# Patient Record
Sex: MALE | Race: ASIAN | NOT HISPANIC OR LATINO | Employment: OTHER | URBAN - METROPOLITAN AREA
[De-identification: names, ages, dates, MRNs, and addresses within clinical notes are randomized per-mention and may not be internally consistent; named-entity substitution may affect disease eponyms.]

---

## 2017-01-13 RX ORDER — CETIRIZINE HYDROCHLORIDE 10 MG/1
10 TABLET ORAL DAILY
Qty: 90 TABLET | Refills: 2 | Status: SHIPPED | OUTPATIENT
Start: 2017-01-13 | End: 2017-01-16 | Stop reason: SDUPTHER

## 2017-01-13 NOTE — TELEPHONE ENCOUNTER
----- Message from Melvi Lopez sent at 1/13/2017 12:43 PM CST -----  Contact: pt  Pt states needs a prescription called in today for him,pt is out,Cetirizine 10 mg...[phone] (home)           ..  Cait on [facility] and [facility] nelly

## 2017-01-16 RX ORDER — CETIRIZINE HYDROCHLORIDE 10 MG/1
10 TABLET ORAL DAILY
Qty: 90 TABLET | Refills: 2 | Status: SHIPPED | OUTPATIENT
Start: 2017-01-16 | End: 2017-06-26 | Stop reason: SDUPTHER

## 2017-01-16 NOTE — TELEPHONE ENCOUNTER
----- Message from Melvi Lopez sent at 1/16/2017 10:34 AM CST -----  Contact: pt  Pt states a prescription was supposed to be called in to the pharmacy on Friday but was not for  cetirizine 10 mg..[phone] (please notify)         .  Cait on government(please)

## 2017-02-01 ENCOUNTER — OFFICE VISIT (OUTPATIENT)
Dept: FAMILY MEDICINE | Facility: CLINIC | Age: 67
End: 2017-02-01
Payer: MEDICARE

## 2017-02-01 VITALS
DIASTOLIC BLOOD PRESSURE: 62 MMHG | HEART RATE: 70 BPM | OXYGEN SATURATION: 97 % | RESPIRATION RATE: 18 BRPM | WEIGHT: 197.56 LBS | HEIGHT: 72 IN | BODY MASS INDEX: 26.76 KG/M2 | SYSTOLIC BLOOD PRESSURE: 118 MMHG | TEMPERATURE: 97 F

## 2017-02-01 DIAGNOSIS — J30.9 ALLERGIC RHINITIS, UNSPECIFIED ALLERGIC RHINITIS TRIGGER, UNSPECIFIED RHINITIS SEASONALITY: Primary | ICD-10-CM

## 2017-02-01 PROCEDURE — 99999 PR PBB SHADOW E&M-EST. PATIENT-LVL III: CPT | Mod: PBBFAC,,, | Performed by: INTERNAL MEDICINE

## 2017-02-01 PROCEDURE — 99213 OFFICE O/P EST LOW 20 MIN: CPT | Mod: S$PBB,,, | Performed by: INTERNAL MEDICINE

## 2017-02-01 PROCEDURE — 99213 OFFICE O/P EST LOW 20 MIN: CPT | Mod: PBBFAC,PO | Performed by: INTERNAL MEDICINE

## 2017-02-01 RX ORDER — MONTELUKAST SODIUM 10 MG/1
10 TABLET ORAL NIGHTLY
Qty: 30 TABLET | Refills: 0 | Status: SHIPPED | OUTPATIENT
Start: 2017-02-01 | End: 2017-03-03

## 2017-02-01 RX ORDER — GLUCOSAMINE/CHONDRO SU A 500-400 MG
1 TABLET ORAL 2 TIMES DAILY
COMMUNITY
End: 2021-09-01

## 2017-02-01 RX ORDER — FLUTICASONE PROPIONATE 50 MCG
2 SPRAY, SUSPENSION (ML) NASAL DAILY
Qty: 16 BOTTLE | Refills: 1 | Status: SHIPPED | OUTPATIENT
Start: 2017-02-01 | End: 2018-02-06

## 2017-02-01 NOTE — MR AVS SNAPSHOT
[facility]  [address] [address]  Phone: [phone]                  Darnell Ruelas Jr.   2017 3:30 PM   Office Visit    Description:  Male : 1950   Provider:  Beto Godfrey MD   Department:  [facility]           Reason for Visit     Sinus Problem           Diagnoses this Visit        Comments    Allergic rhinitis, unspecified allergic rhinitis trigger, unspecified rhinitis seasonality    -  Primary            To Do List           Future Appointments        Provider Department Dept Phone    3/1/2017 1:00 PM LABORATORY, [facility] [phone]    3/1/2017 1:40 PM Hermes Mortensen MD [facility] Hemotology Oncology [phone]    2017 11:00 AM Bea Miles PA-C [facility] Rheumatology [phone]      Goals (5 Years of Data)     None      Follow-Up and Disposition     Return if symptoms worsen or fail to improve.       These Medications        Disp Refills Start End    montelukast (SINGULAIR) 10 mg tablet 30 tablet 0 2017 3/3/2017    Take 1 tablet (10 mg total) by mouth every evening. - Oral    Pharmacy: [facility] [address] [place] [address] & [facility] Ph #: [phone]       fluticasone (FLONASE) 50 mcg/actuation nasal spray 16 Bottle 1 2017     2 sprays by Each Nare route once daily. - Each Nare    Pharmacy: [facility] [address] & [facility] Ph #: [phone]         [facility] On Call     [facility] On Call Nurse Care Line -  Assistance  Registered nurses in the [facility] provide clinical advisement, health education, appointment booking, and other advisory services.  Call for this free service at [phone].             Medications           Message regarding Medications     Verify the changes and/or additions to your medication regime listed below are the same as  discussed with your clinician today.  If any of these changes or additions are incorrect, please notify your healthcare provider.        START taking these NEW medications        Refills    montelukast (SINGULAIR) 10 mg tablet 0    Sig: Take 1 tablet (10 mg total) by mouth every evening.    Class: Normal    Route: Oral    fluticasone (FLONASE) 50 mcg/actuation nasal spray 1    Si sprays by Each Nare route once daily.    Class: Normal    Route: Each Nare           Verify that the below list of medications is an accurate representation of the medications you are currently taking.  If none reported, the list may be blank. If incorrect, please contact your healthcare provider. Carry this list with you in case of emergency.           Current Medications     cetirizine (ZYRTEC) 10 MG tablet Take 1 tablet (10 mg total) by mouth once daily.    diclofenac sodium 20 mg/gram /actuation(2 %) sopm Apply 40 mg topically 2 (two) times daily.    ERGOCALCIFEROL, VITAMIN D2, (VITAMIN D ORAL) Take 1 tablet by mouth once daily.     fish oil-omega-3 fatty acids 300-1,000 mg capsule Take 2 g by mouth once daily.    glucosamine-chondroitin 500-400 mg tablet Take 1 tablet by mouth 2 (two) times daily.    multivitamin capsule Take 1 capsule by mouth once daily.    nystatin (MYCOSTATIN) powder Apply topically 2 (two) times daily.    simvastatin (ZOCOR) 20 MG tablet TAKE 1 TABLET EVERY EVENING    tramadol (ULTRAM) 50 mg tablet Take 1 tablet (50 mg total) by mouth every 6 (six) hours as needed for Pain.    vardenafil (LEVITRA) 20 MG tablet Take 20 mg by mouth.    fluticasone (FLONASE) 50 mcg/actuation nasal spray 2 sprays by Each Nare route once daily.    montelukast (SINGULAIR) 10 mg tablet Take 1 tablet (10 mg total) by mouth every evening.           Clinical Reference Information           Vital Signs - Last Recorded  Most recent update: 2017  3:49 PM by Armando Murdock LPN    BP Pulse Temp Resp Ht Wt    118/62 (BP Location: Right  arm, Patient Position: Sitting, BP Method: Manual) 70 97 °F (36.1 °C) (Tympanic) 18 6' (1.829 m) 89.6 kg (197 lb 8.5 oz)    SpO2 BMI             97% 26.79 kg/m2         Blood Pressure          Most Recent Value    BP  118/62      Allergies as of 2/1/2017     No Known Allergies      Immunizations Administered on Date of Encounter - 2/1/2017     None

## 2017-02-01 NOTE — PROGRESS NOTES
Subjective:       Patient ID: Darnell Ruelas Jr. is a 67 y.o. male.    Chief Complaint: Sinus Problem  -allergy symptoms-------  HPI  Review of Systems   Constitutional: Negative for chills and fever.   HENT: Positive for congestion, postnasal drip, rhinorrhea and sneezing. Negative for sore throat.    Eyes: Positive for itching.   Respiratory: Negative for apnea, cough, choking, chest tightness, shortness of breath, wheezing and stridor.    Cardiovascular: Negative for chest pain, palpitations and leg swelling.   Gastrointestinal: Negative for abdominal pain, nausea and vomiting.   Genitourinary: Negative.    Neurological: Negative.        Objective:      Physical Exam   Constitutional: He is oriented to person, place, and time. He appears well-developed and well-nourished. No distress.   HENT:   Head: Normocephalic and atraumatic.   Mouth/Throat: No oropharyngeal exudate.   Eyes: Pupils are equal, round, and reactive to light.   Neck: Normal range of motion. Neck supple. Carotid bruit is not present.   Cardiovascular: Normal rate, regular rhythm, normal heart sounds and intact distal pulses.    Pulmonary/Chest: Effort normal and breath sounds normal. No respiratory distress. He has no wheezes. He has no rales. He exhibits no tenderness.   Abdominal: Soft. Bowel sounds are normal.   Musculoskeletal: Normal range of motion. He exhibits no edema or tenderness.   Neurological: He is alert and oriented to person, place, and time.   Skin: Skin is warm and dry. No rash noted. He is not diaphoretic. No erythema. No pallor.   Psychiatric: He has a normal mood and affect. His behavior is normal. Judgment and thought content normal.   Nursing note and vitals reviewed.      Assessment:       1. Allergic rhinitis, unspecified allergic rhinitis trigger, unspecified rhinitis seasonality        Plan:         singulair , flonase nose spray---------call if persists,

## 2017-03-01 ENCOUNTER — LAB VISIT (OUTPATIENT)
Dept: LAB | Facility: HOSPITAL | Age: 67
End: 2017-03-01
Attending: INTERNAL MEDICINE
Payer: MEDICARE

## 2017-03-01 ENCOUNTER — OFFICE VISIT (OUTPATIENT)
Dept: HEMATOLOGY/ONCOLOGY | Facility: CLINIC | Age: 67
End: 2017-03-01
Payer: MEDICARE

## 2017-03-01 VITALS
WEIGHT: 196.88 LBS | RESPIRATION RATE: 18 BRPM | DIASTOLIC BLOOD PRESSURE: 64 MMHG | BODY MASS INDEX: 26.09 KG/M2 | HEIGHT: 73 IN | TEMPERATURE: 97 F | OXYGEN SATURATION: 97 % | SYSTOLIC BLOOD PRESSURE: 110 MMHG | HEART RATE: 61 BPM

## 2017-03-01 DIAGNOSIS — D47.2 MGUS (MONOCLONAL GAMMOPATHY OF UNKNOWN SIGNIFICANCE): ICD-10-CM

## 2017-03-01 DIAGNOSIS — C61 MALIGNANT NEOPLASM OF PROSTATE: ICD-10-CM

## 2017-03-01 DIAGNOSIS — D47.2 MGUS (MONOCLONAL GAMMOPATHY OF UNKNOWN SIGNIFICANCE): Primary | ICD-10-CM

## 2017-03-01 LAB
ALBUMIN SERPL BCP-MCNC: 3.7 G/DL
ALP SERPL-CCNC: 72 U/L
ALT SERPL W/O P-5'-P-CCNC: 42 U/L
ANION GAP SERPL CALC-SCNC: 8 MMOL/L
AST SERPL-CCNC: 41 U/L
B2 MICROGLOB SERPL-MCNC: 2.6 UG/ML
BASOPHILS # BLD AUTO: 0.01 K/UL
BASOPHILS NFR BLD: 0.2 %
BILIRUB SERPL-MCNC: 0.5 MG/DL
BUN SERPL-MCNC: 13 MG/DL
CALCIUM SERPL-MCNC: 9 MG/DL
CHLORIDE SERPL-SCNC: 108 MMOL/L
CO2 SERPL-SCNC: 28 MMOL/L
COMPLEXED PSA SERPL-MCNC: <0.01 NG/ML
CREAT SERPL-MCNC: 1.2 MG/DL
DIFFERENTIAL METHOD: NORMAL
EOSINOPHIL # BLD AUTO: 0.2 K/UL
EOSINOPHIL NFR BLD: 3.3 %
ERYTHROCYTE [DISTWIDTH] IN BLOOD BY AUTOMATED COUNT: 13.4 %
EST. GFR  (AFRICAN AMERICAN): >60 ML/MIN/1.73 M^2
EST. GFR  (NON AFRICAN AMERICAN): >60 ML/MIN/1.73 M^2
GLUCOSE SERPL-MCNC: 66 MG/DL
HCT VFR BLD AUTO: 49.2 %
HGB BLD-MCNC: 16.7 G/DL
IGA SERPL-MCNC: 202 MG/DL
IGG SERPL-MCNC: 1150 MG/DL
IGM SERPL-MCNC: 75 MG/DL
LYMPHOCYTES # BLD AUTO: 2.7 K/UL
LYMPHOCYTES NFR BLD: 42.5 %
MCH RBC QN AUTO: 30.3 PG
MCHC RBC AUTO-ENTMCNC: 33.9 %
MCV RBC AUTO: 89 FL
MONOCYTES # BLD AUTO: 0.5 K/UL
MONOCYTES NFR BLD: 7.6 %
NEUTROPHILS # BLD AUTO: 2.9 K/UL
NEUTROPHILS NFR BLD: 46.4 %
PLATELET # BLD AUTO: 212 K/UL
PMV BLD AUTO: 10.3 FL
POTASSIUM SERPL-SCNC: 4.2 MMOL/L
PROT SERPL-MCNC: 7.2 G/DL
RBC # BLD AUTO: 5.52 M/UL
SODIUM SERPL-SCNC: 144 MMOL/L
WBC # BLD AUTO: 6.33 K/UL

## 2017-03-01 PROCEDURE — 99999 PR PBB SHADOW E&M-EST. PATIENT-LVL III: CPT | Mod: PBBFAC,,, | Performed by: INTERNAL MEDICINE

## 2017-03-01 PROCEDURE — 84165 PROTEIN E-PHORESIS SERUM: CPT | Mod: 26,,, | Performed by: PATHOLOGY

## 2017-03-01 PROCEDURE — 99213 OFFICE O/P EST LOW 20 MIN: CPT | Mod: S$PBB,,, | Performed by: INTERNAL MEDICINE

## 2017-03-01 PROCEDURE — 99213 OFFICE O/P EST LOW 20 MIN: CPT | Mod: PBBFAC,PO | Performed by: INTERNAL MEDICINE

## 2017-03-01 NOTE — PROGRESS NOTES
Subjective:       Patient ID: Darnell Ruelas Jr. is a 67 y.o. male.    Chief Complaint: Follow-up (Monoclonal gammopathy of undetermined significance); Results; and Prostate Cancer    HPI 67-year-old male history of monoclonal gammopathy of undetermined significance with elevated free light chains.  History of prostate carcinoma distant past returns for laboratory review    Past Medical History:   Diagnosis Date    Chronic hepatitis C     Hypercholesteremia     Kidney stones     Prostate cancer      History reviewed. No pertinent family history.  Social History     Social History    Marital status:      Spouse name: N/A    Number of children: N/A    Years of education: N/A     Occupational History    Not on file.     Social History Main Topics    Smoking status: Never Smoker    Smokeless tobacco: Never Used    Alcohol use 0.0 oz/week     0 Standard drinks or equivalent per week      Comment: rarely    Drug use: No    Sexual activity: Yes     Other Topics Concern    Not on file     Social History Narrative     Past Surgical History:   Procedure Laterality Date    CHOLECYSTECTOMY      TONSILLECTOMY         Labs:  Lab Results   Component Value Date    WBC 6.33 03/01/2017    HGB 16.7 03/01/2017    HCT 49.2 03/01/2017    MCV 89 03/01/2017     03/01/2017     BMP  Lab Results   Component Value Date     03/01/2017    K 4.2 03/01/2017     03/01/2017    CO2 28 03/01/2017    BUN 13 03/01/2017    CREATININE 1.2 03/01/2017    CALCIUM 9.0 03/01/2017    ANIONGAP 8 03/01/2017    ESTGFRAFRICA >60 03/01/2017    EGFRNONAA >60 03/01/2017     Lab Results   Component Value Date    ALT 42 03/01/2017    AST 41 (H) 03/01/2017     (H) 10/06/2016    ALKPHOS 72 03/01/2017    BILITOT 0.5 03/01/2017       Lab Results   Component Value Date    IRON 158 04/11/2008    TIBC 330 04/11/2008    FERRITIN 330.2 (H) 04/11/2008     No results found for: EPYNRYSC70  No results found for: FOLATE  Lab Results    Component Value Date    TSH 0.330 (L) 09/14/2015         Review of Systems   Constitutional: Negative for activity change, appetite change, chills, diaphoresis, fatigue, fever and unexpected weight change.   HENT: Negative for congestion, dental problem, drooling, ear discharge, ear pain, facial swelling, hearing loss, mouth sores, nosebleeds, postnasal drip, rhinorrhea, sinus pressure, sneezing, sore throat, tinnitus, trouble swallowing and voice change.    Eyes: Negative for photophobia, pain, discharge, redness, itching and visual disturbance.   Respiratory: Negative for apnea, cough, choking, chest tightness, shortness of breath, wheezing and stridor.    Cardiovascular: Negative for chest pain, palpitations and leg swelling.   Gastrointestinal: Negative for abdominal distention, abdominal pain, anal bleeding, blood in stool, constipation, diarrhea, nausea, rectal pain and vomiting.   Endocrine: Negative for cold intolerance, heat intolerance, polydipsia, polyphagia and polyuria.   Genitourinary: Negative for decreased urine volume, difficulty urinating, discharge, dysuria, enuresis, flank pain, frequency, genital sores, hematuria, penile pain, penile swelling, scrotal swelling, testicular pain and urgency.   Musculoskeletal: Negative for arthralgias, back pain, gait problem, joint swelling, myalgias, neck pain and neck stiffness.   Skin: Negative for color change, pallor, rash and wound.   Allergic/Immunologic: Negative for environmental allergies, food allergies and immunocompromised state.   Neurological: Negative for dizziness, tremors, seizures, syncope, facial asymmetry, speech difficulty, weakness, light-headedness, numbness and headaches.   Hematological: Negative for adenopathy. Does not bruise/bleed easily.   Psychiatric/Behavioral: Negative for agitation, behavioral problems, confusion, decreased concentration, dysphoric mood, hallucinations, self-injury, sleep disturbance and suicidal ideas. The  patient is not nervous/anxious and is not hyperactive.        Objective:      Physical Exam   Constitutional: He is oriented to person, place, and time. He appears well-developed and well-nourished. No distress.   HENT:   Head: Normocephalic.   Right Ear: External ear normal.   Left Ear: External ear normal.   Nose: Nose normal. Right sinus exhibits no maxillary sinus tenderness and no frontal sinus tenderness. Left sinus exhibits no maxillary sinus tenderness and no frontal sinus tenderness.   Mouth/Throat: Oropharynx is clear and moist. No oropharyngeal exudate.   Eyes: EOM and lids are normal. Pupils are equal, round, and reactive to light. Right eye exhibits no discharge. Left eye exhibits no discharge. Right conjunctiva is not injected. Right conjunctiva has no hemorrhage. Left conjunctiva is not injected. Left conjunctiva has no hemorrhage. No scleral icterus. Right eye exhibits normal extraocular motion. Left eye exhibits normal extraocular motion.   Neck: Normal range of motion. Neck supple. No JVD present. No tracheal deviation present. No thyromegaly present.   Cardiovascular: Normal rate and regular rhythm.    Pulmonary/Chest: Effort normal. No stridor. No respiratory distress.   Abdominal: Soft. He exhibits no mass. There is no hepatosplenomegaly, splenomegaly or hepatomegaly. There is no tenderness.   Musculoskeletal: Normal range of motion. He exhibits no edema or tenderness.   Lymphadenopathy:        Head (right side): No posterior auricular and no occipital adenopathy present.        Head (left side): No posterior auricular and no occipital adenopathy present.     He has no cervical adenopathy.        Right cervical: No superficial cervical, no deep cervical and no posterior cervical adenopathy present.       Left cervical: No superficial cervical, no deep cervical and no posterior cervical adenopathy present.     He has no axillary adenopathy.        Right: No supraclavicular adenopathy present.         Left: No supraclavicular adenopathy present.   Neurological: He is alert and oriented to person, place, and time. He has normal strength. No cranial nerve deficit. Coordination normal.   Skin: Skin is dry. No rash noted. He is not diaphoretic. No cyanosis or erythema. Nails show no clubbing.   Psychiatric: He has a normal mood and affect. His behavior is normal. Judgment and thought content normal. Cognition and memory are normal.   Vitals reviewed.          Assessment:       1. MGUS (monoclonal gammopathy of unknown significance)    2. Malignant neoplasm of prostate            Plan:     results of laboratory studies test are pending.  Will communicate through electronic patient portal with him if stable will return in 6 months for review with labs drawn prior discussed the nature of monoclonal gammopathy of undetermined significance article sent from up-to-date to him in this regard for his review

## 2017-03-02 LAB
ALBUMIN SERPL ELPH-MCNC: 3.9 G/DL
ALPHA1 GLOB SERPL ELPH-MCNC: 0.31 G/DL
ALPHA2 GLOB SERPL ELPH-MCNC: 0.76 G/DL
B-GLOBULIN SERPL ELPH-MCNC: 0.71 G/DL
GAMMA GLOB SERPL ELPH-MCNC: 1.12 G/DL
KAPPA LC SER QL IA: 2.38 MG/DL
KAPPA LC/LAMBDA SER IA: 1.26
LAMBDA LC SER QL IA: 1.89 MG/DL
PATHOLOGIST INTERPRETATION SPE: NORMAL
PROT SERPL-MCNC: 6.8 G/DL

## 2017-04-03 RX ORDER — SIMVASTATIN 20 MG/1
20 TABLET, FILM COATED ORAL NIGHTLY
Qty: 90 TABLET | Refills: 3 | Status: SHIPPED | OUTPATIENT
Start: 2017-04-03 | End: 2017-04-13 | Stop reason: SDUPTHER

## 2017-04-03 NOTE — TELEPHONE ENCOUNTER
----- Message from Shayla Mcnulty sent at 4/3/2017  3:29 PM CDT -----  Contact: pt  Pt is calling nurse staff regarding a refill RX simvastatin. Pt call back [phone] thanks      [facility] on [facility]

## 2017-04-16 RX ORDER — SIMVASTATIN 20 MG/1
20 TABLET, FILM COATED ORAL NIGHTLY
Qty: 90 TABLET | Refills: 3 | Status: SHIPPED | OUTPATIENT
Start: 2017-04-16 | End: 2019-05-09 | Stop reason: SDUPTHER

## 2017-04-25 ENCOUNTER — OFFICE VISIT (OUTPATIENT)
Dept: RHEUMATOLOGY | Facility: CLINIC | Age: 67
End: 2017-04-25
Payer: MEDICARE

## 2017-04-25 VITALS
WEIGHT: 197.06 LBS | DIASTOLIC BLOOD PRESSURE: 72 MMHG | BODY MASS INDEX: 26 KG/M2 | SYSTOLIC BLOOD PRESSURE: 122 MMHG | HEART RATE: 75 BPM

## 2017-04-25 DIAGNOSIS — M17.0 BILATERAL PRIMARY OSTEOARTHRITIS OF KNEE: Primary | ICD-10-CM

## 2017-04-25 DIAGNOSIS — B18.2 CHRONIC HEPATITIS C WITHOUT HEPATIC COMA: ICD-10-CM

## 2017-04-25 DIAGNOSIS — D47.2 MGUS (MONOCLONAL GAMMOPATHY OF UNKNOWN SIGNIFICANCE): ICD-10-CM

## 2017-04-25 PROCEDURE — 99213 OFFICE O/P EST LOW 20 MIN: CPT | Mod: PBBFAC,PO | Performed by: PHYSICIAN ASSISTANT

## 2017-04-25 PROCEDURE — 99999 PR PBB SHADOW E&M-EST. PATIENT-LVL III: CPT | Mod: PBBFAC,,, | Performed by: PHYSICIAN ASSISTANT

## 2017-04-25 PROCEDURE — 99214 OFFICE O/P EST MOD 30 MIN: CPT | Mod: S$PBB,,, | Performed by: PHYSICIAN ASSISTANT

## 2017-04-25 NOTE — PATIENT INSTRUCTIONS
Tumeric (Curcumin) 1,000mg/day take every day to help with inflammation     Aleve as needed for flare ups of pain (2 aleve twice a day)

## 2017-04-25 NOTE — MR AVS SNAPSHOT
[facility]  [address] [place] [address]  Phone: [phone]  Fax: [phone]                  Darnell Ruelas Jr.   2017 3:00 PM   Office Visit    Description:  Male : 1950   Provider:  GUERITA EidC   Department:  [facility] - Rheumatology           Reason for Visit     Osteoarthritis           Diagnoses this Visit        Comments    Bilateral primary osteoarthritis of knee    -  Primary     Chronic hepatitis C without hepatic coma         MGUS (monoclonal gammopathy of unknown significance)                To Do List           Future Appointments        Provider Department Dept Phone    2017 2:45 PM LABORATORY, [facility] [phone]    2017 1:40 PM Hermes Mortensen MD [facility] Hemotology Oncology [phone]      Goals (5 Years of Data)     None      Follow-Up and Disposition     Return if symptoms worsen or fail to improve.      [facility] On Call     [facility] Nurse Care Line -  Assistance  Unless otherwise directed by your provider, please contact [facility], our nurse care line that is available for  assistance.     Registered nurses in the [facility] provide: appointment scheduling, clinical advisement, health education, and other advisory services.  Call: [phone] (toll free)               Medications           Message regarding Medications     Verify the changes and/or additions to your medication regime listed below are the same as discussed with your clinician today.  If any of these changes or additions are incorrect, please notify your healthcare provider.             Verify that the below list of medications is an accurate representation of the medications you are currently taking.  If none reported, the list may be blank. If incorrect, please contact your healthcare provider. Carry this list with you in case of emergency.           Current Medications     cetirizine (ZYRTEC) 10 MG tablet  Take 1 tablet (10 mg total) by mouth once daily.    diclofenac sodium 20 mg/gram /actuation(2 %) sopm Apply 40 mg topically 2 (two) times daily.    ERGOCALCIFEROL, VITAMIN D2, (VITAMIN D ORAL) Take 1 tablet by mouth once daily.     fish oil-omega-3 fatty acids 300-1,000 mg capsule Take 2 g by mouth once daily.    fluticasone (FLONASE) 50 mcg/actuation nasal spray 2 sprays by Each Nare route once daily.    glucosamine-chondroitin 500-400 mg tablet Take 1 tablet by mouth 2 (two) times daily.    multivitamin capsule Take 1 capsule by mouth once daily.    nystatin (MYCOSTATIN) powder Apply topically 2 (two) times daily.    simvastatin (ZOCOR) 20 MG tablet Take 1 tablet (20 mg total) by mouth every evening.    tramadol (ULTRAM) 50 mg tablet Take 1 tablet (50 mg total) by mouth every 6 (six) hours as needed for Pain.    vardenafil (LEVITRA) 20 MG tablet Take 20 mg by mouth.           Clinical Reference Information           Your Vitals Were     BP Pulse Weight BMI       122/72 75 89.4 kg (197 lb 1.5 oz) 26 kg/m2       Blood Pressure          Most Recent Value    BP  122/72      Allergies as of 4/25/2017     No Known Allergies      Immunizations Administered on Date of Encounter - 4/25/2017     None      Instructions    Tumeric (Curcumin) 1,000mg/day take every day to help with inflammation     Aleve as needed for flare ups of pain (2 aleve twice a day)       Language Assistance Services     ATTENTION: Language assistance services are available, free of charge. Please call [phone].      ATENCIÓN: Si humble jorge, tiene a carter disposición servicios gratuitos de asistencia lingüística. Llame al [phone].     [facility] Ý: N?u b?n nói Ti?ng Vi?t, có các d?ch v? h? tr? ngôn ng? mi?n phí dành cho b?n. G?i s? [phone].         [facility] - [facility] complies with applicable Federal civil rights laws and does not discriminate on the basis of race, color, national origin, age, disability, or sex.

## 2017-04-25 NOTE — PROGRESS NOTES
Subjective:       Patient ID: Darnell Ruelas Jr. is a 67 y.o. male.    Chief Complaint: Osteoarthritis    HPI Comments: Mr. Ruelas is here for routine follow up.  He has OA of his bilateral knees.  In the past he has had some issues with his shoulder but this is better.  He his very active and works out regularly at the [employer].  He has had joint injections in his knee in the past that did very well.  Otherwise he takes an occasional tramadol for his pain.  He also has pennsaid topical at home but hasn't used it too much.  His knee pain overall has improved since he started using glucosamine chondroitin.  He complains of an occasional pain in his right arm in between his shoulder and elbow.  It hurts him in the morning and improves with activity. No neck pain, no numbness or tingling, no weakness.     Also of note he has chronic hep C. He did see a GI doctor and is currently sitting tight, no treatment. He has MGUS and is being followed by dr. Mortensen, this is stable.         Review of Systems   Constitutional: Negative for unexpected weight change.   HENT: Negative for mouth sores and rhinorrhea.    Eyes: Negative for pain, discharge and redness.   Respiratory: Negative for shortness of breath.    Cardiovascular: Negative for leg swelling.   Gastrointestinal: Negative for diarrhea.   Endocrine: Negative for cold intolerance and heat intolerance.   Genitourinary: Negative for dysuria.   Musculoskeletal: Positive for arthralgias.        Right arm pain occasional   Allergic/Immunologic: Negative for immunocompromised state.   Psychiatric/Behavioral: The patient is not nervous/anxious.          Objective:     /72  Pulse 75  Wt 89.4 kg (197 lb 1.5 oz)  BMI 26 kg/m2     Physical Exam   Constitutional: He is oriented to person, place, and time and well-developed, well-nourished, and in no distress.   HENT:   Head: Normocephalic and atraumatic.   Eyes: Pupils are equal, round, and reactive to light. Right eye exhibits no  discharge.   Neck: Normal range of motion.   Cardiovascular: Normal rate, regular rhythm and normal heart sounds.  Exam reveals no friction rub.    Pulmonary/Chest: Effort normal and breath sounds normal. No respiratory distress.   Abdominal: Soft. He exhibits no distension. There is no tenderness.       Right Side Rheumatological Exam     Examination finds the shoulder, elbow, wrist and knee normal.    Left Side Rheumatological Exam     Examination finds the shoulder, elbow, wrist and knee normal.      Lymphadenopathy:     He has no cervical adenopathy.   Neurological: He is alert and oriented to person, place, and time.   Skin: No rash noted. No erythema.     Psychiatric: Mood normal.   Musculoskeletal: Normal range of motion. He exhibits no edema or deformity.   No effusions to tonny knees, good rom, no warmth, bilateral crepitus to both knees    Tonny shoulders full rom, no crepitus, strength is good           Recent Results (from the past 2688 hour(s))   Comprehensive metabolic panel    Collection Time: 03/01/17  1:30 PM   Result Value Ref Range    Sodium 144 136 - 145 mmol/L    Potassium 4.2 3.5 - 5.1 mmol/L    Chloride 108 95 - 110 mmol/L    CO2 28 23 - 29 mmol/L    Glucose 66 (L) 70 - 110 mg/dL    BUN, Bld 13 8 - 23 mg/dL    Creatinine 1.2 0.5 - 1.4 mg/dL    Calcium 9.0 8.7 - 10.5 mg/dL    Total Protein 7.2 6.0 - 8.4 g/dL    Albumin 3.7 3.5 - 5.2 g/dL    Total Bilirubin 0.5 0.1 - 1.0 mg/dL    Alkaline Phosphatase 72 55 - 135 U/L    AST 41 (H) 10 - 40 U/L    ALT 42 10 - 44 U/L    Anion Gap 8 8 - 16 mmol/L    eGFR if African American >60 >60 mL/min/1.73 m^2    eGFR if non African American >60 >60 mL/min/1.73 m^2   CBC auto differential    Collection Time: 03/01/17  1:30 PM   Result Value Ref Range    WBC 6.33 3.90 - 12.70 K/uL    RBC 5.52 4.60 - 6.20 M/uL    Hemoglobin 16.7 14.0 - 18.0 g/dL    Hematocrit 49.2 40.0 - 54.0 %    MCV 89 82 - 98 fL    MCH 30.3 27.0 - 31.0 pg    MCHC 33.9 32.0 - 36.0 %    RDW 13.4 11.5  - 14.5 %    Platelets 212 150 - 350 K/uL    MPV 10.3 9.2 - 12.9 fL    Gran # 2.9 1.8 - 7.7 K/uL    Lymph # 2.7 1.0 - 4.8 K/uL    Mono # 0.5 0.3 - 1.0 K/uL    Eos # 0.2 0.0 - 0.5 K/uL    Baso # 0.01 0.00 - 0.20 K/uL    Gran% 46.4 38.0 - 73.0 %    Lymph% 42.5 18.0 - 48.0 %    Mono% 7.6 4.0 - 15.0 %    Eosinophil% 3.3 0.0 - 8.0 %    Basophil% 0.2 0.0 - 1.9 %    Differential Method Automated    Immunoglobulin free LT chains blood    Collection Time: 03/01/17  1:30 PM   Result Value Ref Range    Kappa Free Light Chains 2.38 (H) 0.33 - 1.94 mg/dL    Lambda Free Light Chains 1.89 0.57 - 2.63 mg/dL    Kappa/Lambda FLC Ratio 1.26 0.26 - 1.65   Protein electrophoresis, serum    Collection Time: 03/01/17  1:30 PM   Result Value Ref Range    Protein, Serum 6.8 6.0 - 8.4 g/dL    Albumin grams/dl 3.90 3.35 - 5.55 g/dL    Alpha-1 grams/dl 0.31 0.17 - 0.41 g/dL    Alpha-2 grams/dl 0.76 0.43 - 0.99 g/dL    Beta grams/dl 0.71 0.50 - 1.10 g/dL    Gamma grams/dl 1.12 0.67 - 1.58 g/dL   Beta 2 Microglobulin, Serum    Collection Time: 03/01/17  1:30 PM   Result Value Ref Range    Beta-2 Microglobulin 2.6 (H) 0.0 - 2.5 ug/mL   IMMUNOGLOBULINS (IGG, IGA, IGM) QUANTITATIVE    Collection Time: 03/01/17  1:30 PM   Result Value Ref Range    IgG - Serum 1150 650 - 1600 mg/dL    IgA 202 40 - 350 mg/dL    IgM 75 50 - 300 mg/dL   Prostate Specific Antigen, Diagnostic    Collection Time: 03/01/17  1:30 PM   Result Value Ref Range    PSA DIAGNOSTIC <0.01 0.00 - 4.00 ng/mL   Pathologist Interpretation SPE    Collection Time: 03/01/17  1:30 PM   Result Value Ref Range    Pathologist Interpretation SPE REVIEWED      xr knees:   Bilateral tricompartment degenerative changes with increased involvement of the lateral compartments, greater on the RIGHT.  No fracture, dislocation or definite effusion.  Surgical clip identified along the medial margin of the RIGHT proximal calf.           Assessment:       1. Bilateral primary osteoarthritis of knee    2.  Chronic hepatitis C without hepatic coma    3. MGUS (monoclonal gammopathy of unknown significance)          1. OA knees right more than left: improved with glucosamine chondroitin, occasional ultram   2. Chronic Hep C:  Has seen GI, stable, not wanting treatment  3. MGUS: seeing Dr. Mortensen, stable  Plan:       Recommend tumeric 1000mg/day as well as continuing glucosamine chondroitin  Aleve prn for flare up of pain  Call us if knee pain worsens, we will inject steroid vs viscous injection    Return prn

## 2017-04-27 ENCOUNTER — TELEPHONE (OUTPATIENT)
Dept: RHEUMATOLOGY | Facility: CLINIC | Age: 67
End: 2017-04-27

## 2017-04-27 NOTE — TELEPHONE ENCOUNTER
Returned pt call pt was asking about tumeric and I told him he can take that with his other meds pt verbalized understanding.

## 2017-04-27 NOTE — TELEPHONE ENCOUNTER
----- Message from Shelia Colin sent at 4/26/2017  2:37 PM CDT -----  Contact: pt  Please call pt @ [phone], pt states he was in clinic yesterday and given a script need clarify if he can take this with the medication with a med he already have.

## 2017-06-06 ENCOUNTER — OFFICE VISIT (OUTPATIENT)
Dept: FAMILY MEDICINE | Facility: CLINIC | Age: 67
End: 2017-06-06
Payer: MEDICARE

## 2017-06-06 VITALS
RESPIRATION RATE: 18 BRPM | HEART RATE: 68 BPM | BODY MASS INDEX: 26 KG/M2 | OXYGEN SATURATION: 98 % | DIASTOLIC BLOOD PRESSURE: 82 MMHG | TEMPERATURE: 98 F | HEIGHT: 73 IN | SYSTOLIC BLOOD PRESSURE: 126 MMHG | WEIGHT: 196.19 LBS

## 2017-06-06 DIAGNOSIS — M79.601 PAIN OF RIGHT UPPER EXTREMITY: Primary | ICD-10-CM

## 2017-06-06 PROCEDURE — 99999 PR PBB SHADOW E&M-EST. PATIENT-LVL IV: CPT | Mod: PBBFAC,,, | Performed by: REGISTERED NURSE

## 2017-06-06 PROCEDURE — 99213 OFFICE O/P EST LOW 20 MIN: CPT | Mod: S$GLB,,, | Performed by: REGISTERED NURSE

## 2017-06-06 NOTE — PROGRESS NOTES
Subjective:       Patient ID: Darnell Ruelas Jr. is a 67 y.o. male.    Chief Complaint: Arm Pain (RIGHT)      HPI    Mr. Ruelas is here today with c/o RT arm pain ~ few weeks.  No injury or trauma reported.  Pain located to RUE, mainly in AM when waking up, okay as moves around.  Denies hand numbness/tingling.  Using Icy-Hot and Pennsaid topical.      Review of Systems   Constitutional: Negative.    Respiratory: Negative.    Cardiovascular: Negative.    Musculoskeletal: Positive for myalgias (RT upper arm). Negative for joint swelling.   Neurological: Negative.          Patient Active Problem List   Diagnosis    Chronic hepatitis C    Hypercholesteremia    Kidney stones    AR (allergic rhinitis)    Malignant neoplasm of prostate    Bilateral primary osteoarthritis of knee    MGUS (monoclonal gammopathy of unknown significance)         Objective:     Physical Exam   Constitutional: He appears well-developed and well-nourished.   Cardiovascular: Normal pulses.    Musculoskeletal:        Right upper arm: He exhibits tenderness. He exhibits no bony tenderness, no swelling, no edema and no deformity.        Arms:  Neurological: He has normal strength. He displays no atrophy and no tremor. No sensory deficit. He exhibits normal muscle tone. He displays no seizure activity.         Medication List with Changes/Refills   Current Medications    CETIRIZINE (ZYRTEC) 10 MG TABLET    Take 1 tablet (10 mg total) by mouth once daily.    DICLOFENAC SODIUM 20 MG/GRAM /ACTUATION(2 %) SOPM    Apply 40 mg topically 2 (two) times daily.    ERGOCALCIFEROL, VITAMIN D2, (VITAMIN D ORAL)    Take 1 tablet by mouth once daily.     FISH OIL-OMEGA-3 FATTY ACIDS 300-1,000 MG CAPSULE    Take 2 g by mouth once daily.    FLUTICASONE (FLONASE) 50 MCG/ACTUATION NASAL SPRAY    2 sprays by Each Nare route once daily.    GLUCOSAMINE-CHONDROITIN 500-400 MG TABLET    Take 1 tablet by mouth 2 (two) times daily.    MULTIVITAMIN CAPSULE    Take 1 capsule  by mouth once daily.    NYSTATIN (MYCOSTATIN) POWDER    Apply topically 2 (two) times daily.    SIMVASTATIN (ZOCOR) 20 MG TABLET    Take 1 tablet (20 mg total) by mouth every evening.    TRAMADOL (ULTRAM) 50 MG TABLET    Take 1 tablet (50 mg total) by mouth every 6 (six) hours as needed for Pain.    VARDENAFIL (LEVITRA) 20 MG TABLET    Take 20 mg by mouth.           Diagnosis       1. Pain of right upper extremity          Assessment/ Plan     Pain of right upper extremity  · Ice/heat, HEP.  · Advil or Motrin prn pain.  · Glucosamine/chondrioitin as directed.      Follow-up in clinic as needed.      JOSÉ MIGUEL Ramon  [facility]

## 2017-06-26 ENCOUNTER — OFFICE VISIT (OUTPATIENT)
Dept: FAMILY MEDICINE | Facility: CLINIC | Age: 67
End: 2017-06-26
Payer: MEDICARE

## 2017-06-26 VITALS
DIASTOLIC BLOOD PRESSURE: 64 MMHG | HEART RATE: 106 BPM | SYSTOLIC BLOOD PRESSURE: 112 MMHG | OXYGEN SATURATION: 98 % | TEMPERATURE: 98 F | WEIGHT: 194.44 LBS | BODY MASS INDEX: 25.77 KG/M2 | HEIGHT: 73 IN | RESPIRATION RATE: 18 BRPM

## 2017-06-26 DIAGNOSIS — B18.2 CHRONIC HEPATITIS C WITHOUT HEPATIC COMA: ICD-10-CM

## 2017-06-26 DIAGNOSIS — J30.9 ALLERGIC RHINITIS, UNSPECIFIED ALLERGIC RHINITIS TRIGGER, UNSPECIFIED RHINITIS SEASONALITY: ICD-10-CM

## 2017-06-26 DIAGNOSIS — L29.9 ITCHY SKIN: ICD-10-CM

## 2017-06-26 DIAGNOSIS — D47.2 MGUS (MONOCLONAL GAMMOPATHY OF UNKNOWN SIGNIFICANCE): Primary | ICD-10-CM

## 2017-06-26 DIAGNOSIS — E78.00 HYPERCHOLESTEREMIA: ICD-10-CM

## 2017-06-26 DIAGNOSIS — M17.0 BILATERAL PRIMARY OSTEOARTHRITIS OF KNEE: ICD-10-CM

## 2017-06-26 PROCEDURE — 99999 PR PBB SHADOW E&M-EST. PATIENT-LVL IV: CPT | Mod: PBBFAC,,, | Performed by: INTERNAL MEDICINE

## 2017-06-26 PROCEDURE — 99214 OFFICE O/P EST MOD 30 MIN: CPT | Mod: PBBFAC,PO | Performed by: INTERNAL MEDICINE

## 2017-06-26 PROCEDURE — 99397 PER PM REEVAL EST PAT 65+ YR: CPT | Mod: S$PBB,,, | Performed by: INTERNAL MEDICINE

## 2017-06-26 RX ORDER — CETIRIZINE HYDROCHLORIDE 10 MG/1
10 TABLET ORAL DAILY
Qty: 90 TABLET | Refills: 2 | Status: SHIPPED | OUTPATIENT
Start: 2017-06-26 | End: 2017-11-07 | Stop reason: SDUPTHER

## 2017-06-26 NOTE — PROGRESS NOTES
Subjective:       Patient ID: Darnell Ruelas Jr. is a 67 y.o. male.    Chief Complaint: Annual Exam and Hyperlipidemia    Hyperlipidemia   Pertinent negatives include no chest pain, myalgias or shortness of breath. Current antihyperlipidemic treatment includes statins. The current treatment provides significant improvement of lipids.     Review of Systems   Constitutional: Negative for activity change, appetite change, chills, diaphoresis, fatigue, fever and unexpected weight change.   HENT: Negative for drooling, ear discharge, ear pain, facial swelling, hearing loss, mouth sores, nosebleeds, postnasal drip, rhinorrhea, sinus pressure, sneezing, sore throat, tinnitus, trouble swallowing and voice change.    Eyes: Negative for photophobia, redness and visual disturbance.   Respiratory: Negative for apnea, cough, choking, chest tightness, shortness of breath and wheezing.    Cardiovascular: Negative for chest pain, palpitations and leg swelling.   Gastrointestinal: Negative for abdominal distention, abdominal pain, anal bleeding, blood in stool, constipation, diarrhea, nausea and vomiting.   Endocrine: Negative for cold intolerance, heat intolerance, polydipsia, polyphagia and polyuria.   Genitourinary: Negative for difficulty urinating, dysuria, enuresis, flank pain, frequency, genital sores, hematuria and urgency.   Musculoskeletal: Negative for arthralgias, back pain, gait problem, joint swelling, myalgias, neck pain and neck stiffness.        Continues with positional right upper arm pain------   Skin: Negative for color change, pallor, rash and wound.        Itchy hands   Allergic/Immunologic: Negative for food allergies and immunocompromised state.   Neurological: Negative for dizziness, tremors, seizures, syncope, facial asymmetry, speech difficulty, weakness, light-headedness, numbness and headaches.   Hematological: Negative for adenopathy. Does not bruise/bleed easily.   Psychiatric/Behavioral: Negative for  agitation, behavioral problems, confusion, decreased concentration, dysphoric mood, hallucinations, self-injury, sleep disturbance and suicidal ideas. The patient is not nervous/anxious and is not hyperactive.        Objective:      Physical Exam   Constitutional: He is oriented to person, place, and time. He appears well-developed and well-nourished. No distress.   HENT:   Head: Normocephalic and atraumatic.   Eyes: Pupils are equal, round, and reactive to light. No scleral icterus.   Neck: Normal range of motion. Neck supple. No JVD present. Carotid bruit is not present. No tracheal deviation present. No thyromegaly present.   Cardiovascular: Normal rate, regular rhythm, normal heart sounds and intact distal pulses.    Pulmonary/Chest: Effort normal and breath sounds normal. No respiratory distress. He has no wheezes. He has no rales. He exhibits no tenderness.   Abdominal: Soft. Bowel sounds are normal. He exhibits no distension. There is no tenderness. There is no rebound and no guarding.   Musculoskeletal: Normal range of motion. He exhibits no edema or tenderness.   Lymphadenopathy:     He has no cervical adenopathy.   Neurological: He is alert and oriented to person, place, and time. No cranial nerve deficit. Coordination normal.   Skin: Skin is warm and dry. No rash noted. He is not diaphoretic. No erythema. No pallor.   Psychiatric: He has a normal mood and affect. His behavior is normal. Judgment and thought content normal.       Assessment:       1. MGUS (monoclonal gammopathy of unknown significance)    2. Bilateral primary osteoarthritis of knee    3. Allergic rhinitis, unspecified allergic rhinitis trigger, unspecified rhinitis seasonality    4. Hypercholesteremia    5. Chronic hepatitis C without hepatic coma    6. Itchy skin        Plan:        stable-continue meds.                Notes/labs reviewed.                         Check lipids,afp,cpk.           Sees urology .             Derm consult for  itchy skin.                  Physiatry consult for arm pain.                    Declines f/u with gi for hep c.                   F/u prn or 6 month.s

## 2017-06-27 ENCOUNTER — INITIAL CONSULT (OUTPATIENT)
Dept: PHYSICAL MEDICINE AND REHAB | Facility: CLINIC | Age: 67
End: 2017-06-27
Payer: MEDICARE

## 2017-06-27 VITALS
HEART RATE: 68 BPM | SYSTOLIC BLOOD PRESSURE: 116 MMHG | WEIGHT: 194 LBS | DIASTOLIC BLOOD PRESSURE: 80 MMHG | BODY MASS INDEX: 25.71 KG/M2 | HEIGHT: 73 IN | RESPIRATION RATE: 14 BRPM

## 2017-06-27 DIAGNOSIS — R29.898 ARM WEAKNESS: ICD-10-CM

## 2017-06-27 DIAGNOSIS — M47.22 OSTEOARTHRITIS OF SPINE WITH RADICULOPATHY, CERVICAL REGION: Primary | ICD-10-CM

## 2017-06-27 PROCEDURE — 99213 OFFICE O/P EST LOW 20 MIN: CPT | Mod: PBBFAC,PO | Performed by: PHYSICAL MEDICINE & REHABILITATION

## 2017-06-27 PROCEDURE — 1125F AMNT PAIN NOTED PAIN PRSNT: CPT | Mod: ,,, | Performed by: PHYSICAL MEDICINE & REHABILITATION

## 2017-06-27 PROCEDURE — 99204 OFFICE O/P NEW MOD 45 MIN: CPT | Mod: S$PBB,,, | Performed by: PHYSICAL MEDICINE & REHABILITATION

## 2017-06-27 PROCEDURE — 99999 PR PBB SHADOW E&M-EST. PATIENT-LVL III: CPT | Mod: PBBFAC,,, | Performed by: PHYSICAL MEDICINE & REHABILITATION

## 2017-06-27 PROCEDURE — 1159F MED LIST DOCD IN RCRD: CPT | Mod: ,,, | Performed by: PHYSICAL MEDICINE & REHABILITATION

## 2017-06-27 RX ORDER — GABAPENTIN 300 MG/1
300 CAPSULE ORAL NIGHTLY
Qty: 30 CAPSULE | Refills: 1 | Status: SHIPPED | OUTPATIENT
Start: 2017-06-27 | End: 2017-11-22

## 2017-06-27 NOTE — PROGRESS NOTES
PM&R NEW PATIENT HISTORY & PHYSICAL :    Referring Physician:    Chief Complaint   Patient presents with    Arm Pain     right, upper       HPI: This is a 67 y.o.  male being seen in clinic today for evaluation of chronic right arm abnormal sensation that occurs mostly in the morning since January.  With stretches and moving around, his arm symptoms improve.   He has some difficulty with push ups due to slight weakness on the right.  In the past he had Cspine issues that resolved with a home traction unit.    History obtained from patient    Functional History:  Walking: Not limited  Transfers: Independent  Assistive devices: No  Power mobility: No  Falls: None     Needs help with:  Nothing - all ADLS normal    Review of Systems:     General- denies lethargy, weight change, fever, chills  Head/neck- denies swallowing difficulties  ENT- denies hearing changes  Cardiovascular-denies chest pain  Pulmonary- denies shortness of breath  GI- denies constipation or bowel incontinence  - denies bladder incontinence  Skin- denies wounds or rashes  Musculoskeletal- denies weakness, denies pain  Neurologic- +/-numbness and tingling  Psychiatric- denies depressive or psychotic features, denies anxiety  Lymphatic-denies swelling  Endocrine- denies hypoglycemic symptoms/DM history    Physical Examination:  General: Well developed, well nourished male, NAD    Spinal Examination: CERVICAL  Active ROM is within normal limits.  Inspection: No deformity of spinal alignment.  Palpation: No vertebral tenderness to percussion.  Mild tightness at trapezius bilaterally  Spurling test: neg    Spinal Examination: LUMBAR or THORACIC  Active ROM is within normal limits.  Inspection: No deformity of spinal alignment.     Musculoskeletal Tests:  Phalen:    Elbow compression (ulnar): neg  Tinel at wrist: neg  Scratch-Collapse Test:     Bilateral Upper and Lower Extremities:  Pulses are 2+ at radial, bilaterally.  Shoulder/Elbow/Wrist/Hand ROM  wnl except right rotator cuff weakness   Hip/Knee/Ankle ROM   Bilateral Extremities show normal capillary refill.  No signs of cyanosis, rubor, edema, skin changes, or dysvascular changes of appendages.  Nails appear intact.    Neurological Exam:  Cranial Nerves:  II-XII grossly intact    Manual Muscle Testing: (Motor 5=normal)    RIGHT Upper extremity: Shoulder abduction 5/5, Biceps 5/5, Triceps 5/5, Wrist extension 5/5, Abductor pollicis brevis 5/5, Ulnar hand intrinsics 5/5,  LEFT Upper extremity: Shoulder abduction 5/5, Biceps 5/5, Triceps 5/5, Wrist extension 5/5, Abductor pollicis brevis 5/5, Ulnar hand intrinsics 5/5,    No focal atrophy is noted of either upper or lower extremity.    Bilateral Reflexes:1+bic tric br  Patel's response is absent bilaterally.    Sensation: tested to light touch  - intact in arms, mild abnormality at right lateral arm/forearm    Gait: Narrow base and good arm swing.    Cerebellar:  tandem gait.     IMPRESSION/PLAN: This is a 67 y.o.  male with right rotator cuff weakness, cervical DJD/DDD probable right arm radiculitis-C6    1. Rx for PT-brpt-Traction, stretch, cuff strengthening, ROM, posture, modalities  2. Try gabapentin 300mg QHS  3. Handouts on neck care, exercise, stretch provided  4. Reviewed old cspine imaging  5. If not improving, will get MRI and refer for SELINA     Jasmin Alcazar M.D.  Physical Medicine and Rehab

## 2017-06-27 NOTE — LETTER
June 27, 2017      Beto Godfrey MD  [address] [place] [address]           [facility]  [address] [place] [address]  Phone: [phone]  Fax: [phone]          Patient: Darnell Ruelas Jr.   MR Number: [id number]   YOB: 1950   Date of Visit: 6/27/2017       Dear Dr. Beto Godfrey:    Thank you for referring Darnell Ruelas to me for evaluation. Attached you will find relevant portions of my assessment and plan of care.    If you have questions, please do not hesitate to call me. I look forward to following Darnell Ruelas along with you.    Sincerely,    Jasmin Alcazar MD    Enclosure  CC:  No Recipients    If you would like to receive this communication electronically, please contact [email or web address] or [phone] to request more information on [employer] Link access.    For providers and/or their staff who would like to refer a patient to [facility], please contact us through our one-stop-shop provider referral line, [facility], at [phone].    If you feel you have received this communication in error or would no longer like to receive these types of communications, please e-mail [email or web address]

## 2017-06-27 NOTE — PATIENT INSTRUCTIONS
Nonsurgical Treatment of Cervical Spine Problems  Cervical spine problems can often be treated without surgery. Choices include rest, medicines, or injections. Your healthcare provider may also suggest physical therapy or certain exercises. These may all help to relieve your symptoms. These treatments are often successful. But if your symptoms dont subside, talk to your healthcare provider. He or she may tell you that surgery is your best choice.  Relieving your symptoms  Your healthcare provider may recommend:  · Medicines. These help to reduce pain and inflammation.  · Epidural steroid injections. These are injections into the spinal canal near the spinal cord. They may relieve severe pain and reduce inflammation.  · Restricting aggravating activities. This can help to give your cervical spine time to heal.  · A soft cervical collar. This can help to support your head while keeping your cervical spine aligned.  · Traction. This may help relieve pressure on the irritated nerves.  Restoring mobility and strength  Your healthcare provider may recommend that you work with a physical therapist. This can help you regain mobility and strength in your neck. Physical therapy may last for 4 to 8 weeks. It may include:  · Exercises to improve your necks range of motion and strength.  · Evaluation and correction of posture and body movements. This can correct problems that affect your cervical spine.  · Heat, massage, and traction to help relieve your symptoms.  Self-care  Youll take an active role in your own therapy. To protect your neck from further injury:  · Follow any exercise program given to you by your healthcare provider or physical therapist.  · Practice good posture while sitting, standing, or moving.  · Have your workspace evaluated. Rearrange it if needed.  · When lying down, support your neck. You can use a special cervical pillow or rolled-up towel.   Date Last Reviewed: 10/5/2015  © 5208-1579 [employer]. [address] [place] [address]. All rights reserved. This information is not intended as a substitute for professional medical care. Always follow your healthcare professional's instructions.        Your Neck Muscles  The muscles in the neck and shoulders need to be strong to hold the neck and head in place. These muscles also help move the neck and shoulders. Your healthcare provider can recommend exercises to help stretch and strengthen your neck muscles.    Date Last Reviewed: 10/2/2015  © 8605-7799 [employer]. [address] [place] [address]. All rights reserved. This information is not intended as a substitute for professional medical care. Always follow your healthcare professional's instructions.        Understanding Neck Problems       If you suffer from neck pain, youre not alone. Many people have neck pain at some point in their lives. Problems such as poor posture, injury, and wear and tear can lead to neck pain. Your healthcare provider will work with you to find the treatment thats best for your neck.  Types of neck problems    The following problems can cause pain or injury in your neck:  · Strains and sprains: Strains (stretched or torn muscles) and sprains (stretched or torn ligaments) can cause neck pain. Strains and sprains can occur during an accident, or when you overuse your neck through repetitive motion. They can also cause your muscles and ligaments to become inflamed (swollen and painful).  · Whiplash and other injuries: Whiplash can result when an impact throws your head, forcing your neck too far forward, then too far backward. When combined, the two motions can cause a painful injury to different parts of your neck, such as muscles, ligaments, or joints. The most common cause of whiplash is a car accident. But it can also happen during a fall or sports injury.  · Weakened disks: A simple action, such as a sneeze or a cough, can  cause one of your disks to bulge or rupture (herniate). A herniated disk can put pressure on your nerve and cause pain. Over time, disks can also thin out (degenerate). Flattened disks dont cushion vertebrae well and can cause vertebrae to rub together. Also, there is less space for the nerves. This can pinch nerves and cause pain.  · Weakened joints: Aging and injury can cause joints to slowly degenerate. Thinned joints can also cause vertebrae to rub together. This can cause abnormal growths of bone (bone spurs) to form on vertebrae. Bone spurs put pressure on nerves, causing pain.  Common symptoms  If you have a neck problem, you may have one or more of the following symptoms:  · Muscle tension and spasm: You may not be able to move your neck, arms, or shoulders comfortably if you have muscle tension or stiffness in your neck. If your symptoms arent relieved, you may experience muscle spasms, or knots of contracted tissue (trigger points) in areas of your neck and shoulders.  · Aches and pains: Dull aches in your head or neck, sharp pains, and swelling of the soft tissue of your neck and shoulders are common symptoms. If theres pressure on the nerves in your neck, you may feel pain in your arms or hands.  · Numbness or weakness: If you injure the nerves in your neck, you may have numbness, tingling, or weakness in your shoulders, arms, or hands. These symptoms arise when disks or bone spurs press on the nerves in your neck. Severe disease can also affect your legs.  Date Last Reviewed: 8/23/2015 © 2000-2016 [employer]. [address] [place] [address], [place] [address]. All rights reserved. This information is not intended as a substitute for professional medical care. Always follow your healthcare professional's instructions.        Neck Problems: Relieving Your Symptoms  The first goal of treatment is to relieve your symptoms. Your healthcare provider may recommend self-care treatments. These include  resting, applying ice and heat, taking medicine, and doing exercises. Your healthcare provider may also recommend that you see a physical therapist who can teach you ways to care for and strengthen your neck.     Heat relaxes sore muscles and helps relieve spasms.   Self-care treatments  Pain can end quickly or last awhile. Either way, youll want relief as soon as possible. Your healthcare provider can tell you which treatments to do at home to help relieve your pain.  · Lying down for a short time takes pressure from the head off the neck.  · Ice and heat can help reduce pain. To bring down swelling, rest an ice pack wrapped in a thin towel on your neck for 10 to 15 minutes. To relax sore muscles, apply a warm, wet towel to the area. Or you can take a warm bath or shower.  · Over-the-counter medicines, such as ibuprofen, naproxen, and aspirin, can help reduce pain and swelling. Acetaminophen can help relieve pain. Use these only as directed.  · Exercises can relax muscles and ease stiffness. To prepare, drape a warm, wet towel around your neck and shoulders for 5 minutes. Remove the towel. Then do any exercises recommended to you by your healthcare provider.  Physical therapy  If self-care treatments arent helping relieve neck pain, your healthcare provider may suggest physical therapy. Physical therapy is done by a specialist trained to treat injuries. Your physical therapist (PT) will teach you how to strengthen muscles, improve the spines alignment, and help you move properly. Treatment methods used in physical therapy may include:  · Heat. A special heating pad called a neck pack may be applied to your neck.  · Exercises. Your PT will teach you exercises to help strengthen your neck and improve its range of motion.  · Joint mobilization. The PT gently moves your vertebrae to help restore motion in your neck joints and reduce neck pain.  · Soft tissue mobilization. The PT massages and stretches the muscles in  your neck and shoulders.  · Electrical stimulation. Electrical impulses are sent into your neck. This helps reduce soreness and inflammation.  · Education in body mechanics. The PT shows you ways to position and move your body that protect the neck.  Other treatments  If physical therapy doesnt relieve your neck pain, your healthcare provider may suggest other treatments. For example, medicines or injections can help relieve pain and swelling. In some cases, surgery may be needed to treat neck problems.  Date Last Reviewed: 8/23/2015  © 5990-7706 [employer]. [address] [place] [address]. All rights reserved. This information is not intended as a substitute for professional medical care. Always follow your healthcare professional's instructions.        Know Your Neck: The Cervical Spine  By learning about the parts of the neck, you can better understand your neck problem. The bones of the neck are called cervical vertebrae, commonly identified as C1 through C7. Together, they form a bony column called the spine. Vertebrae also protect the spinal cord, a pathway for messages to reach the brain. Surrounding the spine are soft tissues such as muscles, tendons, and nerves.          Flexibility Is Key  For the neck to function normally, it has to be flexible enough to move without discomfort. A healthy neck can move easily in six different directions.     Flexion and extension move the head forward and backward.      Rotation turns the head from left to right, with the chin over the shoulders.      Lateral bending moves the head from side to side.     Date Last Reviewed: 10/1/2015  © 2195-0155 [employer]. [address] [place] [address]. All rights reserved. This information is not intended as a substitute for professional medical care. Always follow your healthcare professional's instructions.        Protecting Your Neck: Posture and Body Mechanics  Protecting your  neck from injuries and pain involves practicing good posture and body mechanics. This may mean correcting bad habits you have related to the way you hold and move your body. The tips below can help you improve your posture and body mechanics.  What is posture and why does it matter?     Using good posture while at your workstation can help prevent pain or injury.     Posture is the way you hold your body. For many of us, this means hunching over, thrusting the chin forward, and slouching the shoulders. But this kind of poor posture keeps muscles from properly supporting the neck and puts stress on muscles, disks, ligaments, and joints in your neck. As a result, injury and pain can occur.  How is your posture?  Use a full-length mirror to check your posture. To begin, stand normally. Then slowly back up against a wall. Is there space between your head and the wall? Do you slouch your shoulders? Is your chin pointing up or down? All these can cause neck pain and injury.  Improving your posture  Follow these steps to improve your posture:  · Pull your shoulders back.  · Think of the ears, shoulders, and hips as a series of dots. Now, adjust your body to connect the dots in a straight line.  · Keep your chin level.  What are body mechanics and why do they matter?  The way you move and position your body during daily activities is called body mechanics. Good body mechanics help protect the neck. This means learning the right ways to stand, sit, and even sleep. So do whats best for your neck and practice good body mechanics.  Standing   To protect your neck while standing:  · Carry objects close to your body.  · Keep your ears and shoulders in a line while standing or walking.  · To lower yourself, bend at the knees with a straight back. Do this instead of looking down and reaching for objects.  · Work at eye level. Dont reach above your head or tilt your head back.  Sitting   To protect your neck while sitting:  · Set  up your workstation so your monitor is at eye level. Also, use a document che when viewing papers or books.  · Keep your knees at or slightly below the level of your hips.  · Sit up straight, with feet flat on the floor. If your feet dont touch the floor, use a footrest.  · Avoid sitting or driving for long periods. Take frequent breaks.  Sleeping   To protect your neck while sleeping:  · Sleep on your back with a pillow under your knees or on your side with a pillow between bent knees. This helps align the spine.  · Avoid using pillows that are too high or too low. Instead, use a neck roll or pillow under your neck while you sleep to keep the neck straight.  · Sleep on a mattress that supports you.  Date Last Reviewed: 8/23/2015 © 2000-2016 [employer]. [address] [place] [address]. All rights reserved. This information is not intended as a substitute for professional medical care. Always follow your healthcare professional's instructions.        Cervical Spine Problems: Disk  The spine has three natural curves. The cervical curve is located in the neck. It forms the top part of the spine. For this reason, it is also called the cervical spine. This sheet tells you more about the parts of the cervical spine and damaged disks. This is a common problem that can affect the cervical spine, but most people don't need surgery for this.   A healthy cervical spine  The spine is made up of the following:    · Vertebrae. These are bones stacked like building blocks that make up the spine. The neck contains the first seven vertebrae of the spine.  · Disks. These are small pads of tissue that lie between the vertebrae. They help cushion and protect the vertebrae when you move.  · The spinal cord. This runs through a large central opening (spinal canal) formed by the vertebrae.  · Nerves. These branch from the spinal cord and carry messages to the body.  · Foramina. These are smaller openings in  the vertebrae. Nerves travel to your arms and other parts of your body from the spinal cord through these openings.  Damaged disks in the cervical spine    One of the most common cervical spine problems is a damaged disk. A disk may be injured by a sudden movement, causing a disk to bulge or break open (herniate). Or a disk may wear out slowly over time (degenerate). A worn-out disk may become so flat that the vertebrae above and below it touch or slip back and forth. As disks wear out, abnormal bone growths (bone spurs) can form on the vertebrae. Bone spurs can also form in the foramina, causing them to narrow (stenosis). If your healthcare provider suspects that you have a damaged disk, tests may be done to confirm the problem, such as an MRI, CT, or EMG. Your healthcare provider will then work with you to plan treatment as needed.   Date Last Reviewed: 10/4/2015  © 4208-7123 [employer]. [address] [place] [address]. All rights reserved. This information is not intended as a substitute for professional medical care. Always follow your healthcare professional's instructions.        Shoulder and Upper Back Stretch  To start, stand tall with your ears, shoulders, and hips in line. Your feet should be slightly apart, positioned just under your hips. Focus your eyes directly in front of you.  this position for a few seconds before starting your exercise. This helps increase your awareness of proper posture.  Reach overhead and slightly back with both arms. Keep your shoulders and neck aligned and your elbows behind your shoulders:  · With your palms facing the ceiling, turn your fingers inward.  · Take a deep breath. Breathe out, and lower your elbows toward your buttocks. Hold for 5 seconds, then return to starting position.  · Repeat 3 times.       Date Last Reviewed: 8/16/2015  © 4338-9502 [employer]. [address] [place] [address]. All rights reserved.  This information is not intended as a substitute for professional medical care. Always follow your healthcare professional's instructions.        Shoulder Clock Exercise    To start, stand tall with your ears, shoulders, and hips in line. Your feet should be slightly apart, positioned just under your hips. Focus your eyes directly in front of you.  this position for a few seconds before starting your exercise. This helps increase your awareness of proper posture.  · Imagine that your right shoulder is the center of a clock. With the outer point of your shoulder, roll it around to slowly trace the outer edge of the clock.  · Move clockwise first, then counterclockwise.  · Repeat 3 to 5 times. Switch shoulders.   Date Last Reviewed: 10/2/2015  © 7686-8070 [employer]. [address] [place] [address]. All rights reserved. This information is not intended as a substitute for professional medical care. Always follow your healthcare professional's instructions.        Shoulder Girdle Stretch      To start, sit in a chair with your feet flat on the floor. Your weight should be slightly forward so that youre balanced evenly on your buttocks. Relax your shoulders and keep your head level. Using a chair with arms may help you keep your balance:  · Place 1 hand on the outside elbow of the other arm.  · Pull the arm across your body. Hold for 30 to 60 seconds. Repeat once.  · Switch sides.  For your safety, check with your healthcare provider before starting an exercise program.   Date Last Reviewed: 8/16/2015  © 6565-2684 [employer]. [address] [place] [address]. All rights reserved. This information is not intended as a substitute for professional medical care. Always follow your healthcare professional's instructions.        Shoulder Squeeze Exercise      To start, sit in a chair with your feet flat on the floor. Shift your weight slightly forward to avoid rounding  your back. Relax. Keep your ears, shoulders, and hips aligned:  · Raise your arms to shoulder height, elbows bent and palms forward.  · Move your arms back, squeezing your shoulder blades together.  · Hold for 10 seconds. Return to starting position.   · Repeat 5 times.   For your safety, check with your healthcare provider before starting an exercise program.   Date Last Reviewed: 8/16/2015  © 3576-3532 [employer]. [address] [place] [address]. All rights reserved. This information is not intended as a substitute for professional medical care. Always follow your healthcare professional's instructions.        Head Tilt / Upper Trapezius Stretch (Flexibility)    1. Sit up straight in a chair with your head and neck in a neutral position, ears in line with shoulders. Hold the edge of your chair seat with your right hand. Tuck your chin in slightly.  2. Tilt your head to the left, while looking straight ahead.  3. Put your left hand on the right side of your head. Gently pull your head to the left. Hold for 30 to 60 seconds. Use gentle pressure to increase the stretch. Dont force your head into position.  4. Return your head and neck to the neutral position.  5. Repeat this exercise 2 times, or as instructed.  6. Switch sides and repeat 2 times, or as instructed.  Challenge yourself  Tuck one end of a towel under your left arm. Then bring the other end over your right shoulder. Pull the towel down on your right shoulder with both hands as you side-bend your head to the left. Repeat with the other side.   Date Last Reviewed: 3/10/2016  © 6041-3151 [employer]. [address] [place] [address]. All rights reserved. This information is not intended as a substitute for professional medical care. Always follow your healthcare professional's instructions.        Neck Clock (Flexibility)    7. Lie on your back on the floor, with your knees bent and your feet flat on the  floor. Place a rolled-up towel or neck roll under your neck.  8. Close your eyes and imagine a clock face. With your nose, slowly trace the outer edge of the clock in a clockwise direction. Move your neck smoothly. Dont force your head or neck.  9. Repeat 5 times, or as instructed.  10. Then switch to a counterclockwise direction, and repeat the exercise 5 times, or as instructed.     Challenge yourself  You can also do this exercise while sitting at your work desk. Sit up straight with your back supported firmly against your chair. You can do the exercise several times throughout your day.   Date Last Reviewed: 3/10/2016  © 4890-9302 [employer]. [address] [place] [address], [place] [address]. All rights reserved. This information is not intended as a substitute for professional medical care. Always follow your healthcare professional's instructions.        Exercises at Your Workstation: Eyes, Neck, and Head  Tired eyes? Stiff neck? A few easy moves can help prevent these kinds of problems. Take a few minutes during your day to do these exercises--right at your desk. They'll loosen up your muscles, keep you more alert, and make a big difference in how you work and feel.  Breathe deeply as you do your exercises. Inhale through your nose, and exhale through your mouth.     For your eyes  Eye cup  · Lean forward with your elbows on your desk.  · Cup your hands and place them lightly over your closed eyes. Hold for a minute, while breathing deeply in and out.  · Slowly uncover and open your eyes. Repeat 2 times.  Eye roll  · Close your eyes. Slowly roll your eyeballs clockwise all the way around. Repeat 3 times.  · Now slowly roll them all the way around counterclockwise. Repeat 3 times.  Eye rest  · Every 20 minutes, look away from the computer screen. Focus on an object at least 20 feet away. Stay focused on this object for a full 20 seconds.    For your neck and head  Warm-up  · Drop your head gently to  your chest. While breathing in, slowly roll your head up to your left shoulder. While breathing out, slowly roll your head back to center. Repeat to the right.  · Repeat 3 times on each side.  Head tilt  · Sit up straight. Tuck in your chin.  · Slowly tip your head to the left. Return to the center. Then, tip your head to the right.  · Repeat 3 times on each side.  Head turn  · Sit up straight.  · Slowly turn your head and look over your left shoulder. Hold for a few seconds. Go back to the center, then repeat to your right.  · Repeat 3 times on each side.  If you feel pain while doing these stretching exercises, please stop and consult your healthcare provider.   Date Last Reviewed: 12/28/2015 © 2000-2016 [employer]. [address] [place] [address], [place] [address]. All rights reserved. This information is not intended as a substitute for professional medical care. Always follow your healthcare professional's instructions.

## 2017-08-21 ENCOUNTER — INITIAL CONSULT (OUTPATIENT)
Dept: DERMATOLOGY | Facility: CLINIC | Age: 67
End: 2017-08-21
Payer: MEDICARE

## 2017-08-21 ENCOUNTER — HOSPITAL ENCOUNTER (OUTPATIENT)
Dept: RADIOLOGY | Facility: HOSPITAL | Age: 67
Discharge: HOME | End: 2017-08-21
Attending: DERMATOLOGY
Payer: MEDICARE

## 2017-08-21 DIAGNOSIS — L29.9 PRURITUS: Primary | ICD-10-CM

## 2017-08-21 DIAGNOSIS — L29.9 PRURITUS: ICD-10-CM

## 2017-08-21 PROCEDURE — 99999 PR PBB SHADOW E&M-EST. PATIENT-LVL II: CPT | Mod: PBBFAC,,, | Performed by: DERMATOLOGY

## 2017-08-21 PROCEDURE — 71020 XR CHEST PA AND LATERAL: CPT | Mod: 26,,, | Performed by: RADIOLOGY

## 2017-08-21 PROCEDURE — 1159F MED LIST DOCD IN RCRD: CPT | Mod: ,,, | Performed by: DERMATOLOGY

## 2017-08-21 PROCEDURE — 99201 PR OFFICE/OUTPT VISIT,NEW,LEVL I: CPT | Mod: S$PBB,,, | Performed by: DERMATOLOGY

## 2017-08-21 PROCEDURE — 71020 XR CHEST PA AND LATERAL: CPT | Mod: TC,PO

## 2017-08-21 RX ORDER — VARDENAFIL HYDROCHLORIDE 20 MG/1
20 TABLET ORAL
COMMUNITY
Start: 2016-09-21 | End: 2018-06-28

## 2017-08-21 NOTE — LETTER
August 22, 2017      Beto Godfrey MD  [address] [place] [address]           [facility]  [address] [place] [address]  Phone: [phone]  Fax: [phone]          Patient: Darnell Ruelas Jr.   MR Number: [id number]   YOB: 1950   Date of Visit: 8/21/2017       Dear Dr. Beto Godfrey:    Thank you for referring Darnell Ruelas to me for evaluation. Attached you will find relevant portions of my assessment and plan of care.    If you have questions, please do not hesitate to call me. I look forward to following Darnell Ruelas along with you.    Sincerely,    Ashley Barger MD    Enclosure  CC:  No Recipients    If you would like to receive this communication electronically, please contact [email or web address] or [phone] to request more information on [employer] Link access.    For providers and/or their staff who would like to refer a patient to [facility], please contact us through our one-stop-shop provider referral line, [facility], at [phone].    If you feel you have received this communication in error or would no longer like to receive these types of communications, please e-mail [email or web address]

## 2017-08-21 NOTE — PROGRESS NOTES
Subjective:       Patient ID:  Darnell Ruelas Jr. is a 67 y.o. male who presents for   Chief Complaint   Patient presents with    Itching     c/o itching on bilateral hands and legs x 3 years      Hx of chronic hepatitis C, prostate cancer and MGUS    History of Present Illness: The patient presents with chief complaint of pruritus.  Location: arms and legs  Duration: 3 years  Signs/Symptoms: pruritus, burning    Prior treatments: zyrtec bid to qD x 6 months with improvement.  Burning returns if d/c zyrtec for 3 days.     Last colonoscopy wnl 5/31/13 recommending repeat in 10 years.          Review of Systems   Constitutional: Negative for fever and chills.   Gastrointestinal: Negative for nausea and vomiting.   Skin: Positive for itching. Negative for daily sunscreen use, activity-related sunscreen use and recent sunburn.   Hematologic/Lymphatic: Does not bruise/bleed easily.        Objective:    Physical Exam   Constitutional: He appears well-developed and well-nourished. No distress.   Neurological: He is alert and oriented to person, place, and time. He is not disoriented.   Psychiatric: He has a normal mood and affect.   Skin:   Areas Examined (abnormalities noted in diagram):   Head / Face Inspection Performed  Neck Inspection Performed  RUE Inspected  LUE Inspection Performed  Nails and Digits Inspection Performed               Assessment / Plan:        Pruritus  -     CMP; Future  -     CBC auto differential; Future  -     TSH; Future  -     Chest X-Ray (PA & Lateral); Future  -     ANJALI; Future  -     Pruritus in setting of chronic hepatitis C and MGUS.  Pt is up to date with colonoscopy.  + hx of prostate cancer.  Discussed that pt's pruritus could be multifactorial.  Currently controlled with zyrtec bid. Will check other labs for pruritus and notify pt of results.               Return if symptoms worsen or fail to improve.

## 2017-08-22 DIAGNOSIS — L29.9 PRURITUS: Primary | ICD-10-CM

## 2017-08-23 ENCOUNTER — HOSPITAL ENCOUNTER (OUTPATIENT)
Dept: RADIOLOGY | Facility: HOSPITAL | Age: 67
Discharge: HOME | End: 2017-08-23
Attending: DERMATOLOGY
Payer: MEDICARE

## 2017-08-23 DIAGNOSIS — L29.9 PRURITUS: ICD-10-CM

## 2017-08-23 PROCEDURE — 72070 X-RAY EXAM THORAC SPINE 2VWS: CPT | Mod: 26,,, | Performed by: RADIOLOGY

## 2017-08-23 PROCEDURE — 72070 X-RAY EXAM THORAC SPINE 2VWS: CPT | Mod: TC,PO

## 2017-08-24 DIAGNOSIS — R93.89 ABNORMAL CHEST X-RAY: Primary | ICD-10-CM

## 2017-08-25 ENCOUNTER — TELEPHONE (OUTPATIENT)
Dept: DERMATOLOGY | Facility: CLINIC | Age: 67
End: 2017-08-25

## 2017-08-25 NOTE — TELEPHONE ENCOUNTER
----- Message from Charo Motta sent at 8/25/2017  8:00 AM CDT -----  Contact: Patient  Patient is returning a call, please call them back at [phone]. Thank you

## 2017-08-29 ENCOUNTER — TELEPHONE (OUTPATIENT)
Dept: RADIOLOGY | Facility: HOSPITAL | Age: 67
End: 2017-08-29

## 2017-08-30 ENCOUNTER — HOSPITAL ENCOUNTER (OUTPATIENT)
Dept: RADIOLOGY | Facility: HOSPITAL | Age: 67
Discharge: HOME | End: 2017-08-30
Attending: DERMATOLOGY
Payer: MEDICARE

## 2017-08-30 ENCOUNTER — OFFICE VISIT (OUTPATIENT)
Dept: HEMATOLOGY/ONCOLOGY | Facility: CLINIC | Age: 67
End: 2017-08-30
Payer: MEDICARE

## 2017-08-30 VITALS
WEIGHT: 193.13 LBS | DIASTOLIC BLOOD PRESSURE: 100 MMHG | OXYGEN SATURATION: 98 % | HEIGHT: 73 IN | SYSTOLIC BLOOD PRESSURE: 140 MMHG | RESPIRATION RATE: 18 BRPM | HEART RATE: 62 BPM | TEMPERATURE: 98 F | BODY MASS INDEX: 25.6 KG/M2

## 2017-08-30 DIAGNOSIS — R93.89 ABNORMAL CHEST X-RAY: ICD-10-CM

## 2017-08-30 DIAGNOSIS — S46.001A INJURY OF RIGHT ROTATOR CUFF, INITIAL ENCOUNTER: ICD-10-CM

## 2017-08-30 DIAGNOSIS — D47.2 MGUS (MONOCLONAL GAMMOPATHY OF UNKNOWN SIGNIFICANCE): Primary | ICD-10-CM

## 2017-08-30 DIAGNOSIS — B18.2 CHRONIC HEPATITIS C WITHOUT HEPATIC COMA: ICD-10-CM

## 2017-08-30 DIAGNOSIS — C61 MALIGNANT NEOPLASM OF PROSTATE: ICD-10-CM

## 2017-08-30 PROCEDURE — 1126F AMNT PAIN NOTED NONE PRSNT: CPT | Mod: ,,, | Performed by: INTERNAL MEDICINE

## 2017-08-30 PROCEDURE — 25500020 PHARM REV CODE 255: Mod: PO | Performed by: DERMATOLOGY

## 2017-08-30 PROCEDURE — 71270 CT THORAX DX C-/C+: CPT | Mod: TC,PO

## 2017-08-30 PROCEDURE — 99213 OFFICE O/P EST LOW 20 MIN: CPT | Mod: PBBFAC,25,PO | Performed by: INTERNAL MEDICINE

## 2017-08-30 PROCEDURE — 71270 CT THORAX DX C-/C+: CPT | Mod: 26,,, | Performed by: RADIOLOGY

## 2017-08-30 PROCEDURE — 99214 OFFICE O/P EST MOD 30 MIN: CPT | Mod: S$PBB,,, | Performed by: INTERNAL MEDICINE

## 2017-08-30 PROCEDURE — 1159F MED LIST DOCD IN RCRD: CPT | Mod: ,,, | Performed by: INTERNAL MEDICINE

## 2017-08-30 PROCEDURE — 99999 PR PBB SHADOW E&M-EST. PATIENT-LVL III: CPT | Mod: PBBFAC,,, | Performed by: INTERNAL MEDICINE

## 2017-08-30 RX ADMIN — IOHEXOL 75 ML: 350 INJECTION, SOLUTION INTRAVENOUS at 07:08

## 2017-08-30 NOTE — PROGRESS NOTES
Subjective:       Patient ID: Darnell Ruelas Jr. is a 67 y.o. male.    Chief Complaint: Follow-up    HPI 67-year-old male with a history of monoclonal gammopathy of undetermined significance patient is complaining about right shoulder pain states that he has difficulty putting his hands above his head and behind his back    Past Medical History:   Diagnosis Date    Chronic hepatitis C     Hypercholesteremia     Kidney stones     Prostate cancer      History reviewed. No pertinent family history.  Social History     Social History    Marital status:      Spouse name: N/A    Number of children: N/A    Years of education: N/A     Occupational History    Not on file.     Social History Main Topics    Smoking status: Never Smoker    Smokeless tobacco: Never Used    Alcohol use 0.0 oz/week      Comment: rarely    Drug use: No    Sexual activity: Yes     Other Topics Concern    Not on file     Social History Narrative    No narrative on file     Past Surgical History:   Procedure Laterality Date    CHOLECYSTECTOMY      TONSILLECTOMY         Labs:  Lab Results   Component Value Date    WBC 7.96 08/23/2017    HGB 16.5 08/23/2017    HCT 47.8 08/23/2017    MCV 88 08/23/2017     08/23/2017     BMP  Lab Results   Component Value Date     08/23/2017    K 4.4 08/23/2017     08/23/2017    CO2 22 (L) 08/23/2017    BUN 17 08/23/2017    CREATININE 1.1 08/23/2017    CALCIUM 9.6 08/23/2017    ANIONGAP 9 08/23/2017    ESTGFRAFRICA >60 08/23/2017    EGFRNONAA >60 08/23/2017     Lab Results   Component Value Date    ALT 45 (H) 08/23/2017    AST 46 (H) 08/23/2017     (H) 10/06/2016    ALKPHOS 68 08/23/2017    BILITOT 0.5 08/23/2017       Lab Results   Component Value Date    IRON 158 04/11/2008    TIBC 330 04/11/2008    FERRITIN 330.2 (H) 04/11/2008     No results found for: [id number]  No results found for: FOLATE  Lab Results   Component Value Date    TSH 0.845 08/21/2017         Review of  Systems   Constitutional: Negative for activity change, appetite change, chills, diaphoresis, fatigue, fever and unexpected weight change.   HENT: Negative for congestion, dental problem, drooling, ear discharge, ear pain, facial swelling, hearing loss, mouth sores, nosebleeds, postnasal drip, rhinorrhea, sinus pressure, sneezing, sore throat, tinnitus, trouble swallowing and voice change.    Eyes: Negative for photophobia, pain, discharge, redness, itching and visual disturbance.   Respiratory: Negative for apnea, cough, choking, chest tightness, shortness of breath, wheezing and stridor.    Cardiovascular: Negative for chest pain, palpitations and leg swelling.   Gastrointestinal: Negative for abdominal distention, abdominal pain, anal bleeding, blood in stool, constipation, diarrhea, nausea, rectal pain and vomiting.   Endocrine: Negative for cold intolerance, heat intolerance, polydipsia, polyphagia and polyuria.   Genitourinary: Positive for frequency. Negative for decreased urine volume, difficulty urinating, discharge, dysuria, enuresis, flank pain, genital sores, hematuria, penile pain, penile swelling, scrotal swelling, testicular pain and urgency.   Musculoskeletal: Positive for arthralgias and myalgias. Negative for back pain, gait problem, joint swelling, neck pain and neck stiffness.   Skin: Negative for color change, pallor, rash and wound.   Allergic/Immunologic: Negative for environmental allergies, food allergies and immunocompromised state.   Neurological: Positive for weakness. Negative for dizziness, tremors, seizures, syncope, facial asymmetry, speech difficulty, light-headedness, numbness and headaches.   Hematological: Negative for adenopathy. Does not bruise/bleed easily.   Psychiatric/Behavioral: Positive for dysphoric mood. Negative for agitation, behavioral problems, confusion, decreased concentration, hallucinations, self-injury, sleep disturbance and suicidal ideas. The patient is not  nervous/anxious and is not hyperactive.        Objective:      Physical Exam   Constitutional: He is oriented to person, place, and time. He appears distressed.   HENT:   Head: Normocephalic.   Right Ear: External ear normal.   Left Ear: External ear normal.   Nose: Nose normal. Right sinus exhibits no maxillary sinus tenderness and no frontal sinus tenderness. Left sinus exhibits no maxillary sinus tenderness and no frontal sinus tenderness.   Mouth/Throat: Oropharynx is clear and moist. No oropharyngeal exudate.   Eyes: EOM and lids are normal. Pupils are equal, round, and reactive to light. Right eye exhibits no discharge. Left eye exhibits no discharge. Right conjunctiva is not injected. Right conjunctiva has no hemorrhage. Left conjunctiva is not injected. Left conjunctiva has no hemorrhage. No scleral icterus. Right eye exhibits normal extraocular motion. Left eye exhibits normal extraocular motion.   Neck: Normal range of motion. Neck supple. No JVD present. No tracheal deviation present. No thyromegaly present.   Cardiovascular: Normal rate and regular rhythm.    Pulmonary/Chest: Effort normal. No stridor. No respiratory distress.   Abdominal: Soft. He exhibits no mass. There is no hepatosplenomegaly, splenomegaly or hepatomegaly. There is no tenderness.   Musculoskeletal: Normal range of motion. He exhibits no edema or tenderness.   Lymphadenopathy:        Head (right side): No posterior auricular and no occipital adenopathy present.        Head (left side): No posterior auricular and no occipital adenopathy present.     He has no cervical adenopathy.        Right cervical: No superficial cervical, no deep cervical and no posterior cervical adenopathy present.       Left cervical: No superficial cervical, no deep cervical and no posterior cervical adenopathy present.     He has no axillary adenopathy.        Right: No supraclavicular adenopathy present.        Left: No supraclavicular adenopathy present.    Neurological: He is alert and oriented to person, place, and time. He has normal strength. No cranial nerve deficit. Coordination normal.   Skin: Skin is dry. No rash noted. He is not diaphoretic. No cyanosis or erythema. Nails show no clubbing.   Psychiatric: His behavior is normal. Judgment and thought content normal. His mood appears anxious. Cognition and memory are normal. He exhibits a depressed mood.   Vitals reviewed.          Assessment:      1. MGUS (monoclonal gammopathy of unknown significance)    2. Malignant neoplasm of prostate    3. Chronic hepatitis C without hepatic coma    4. Injury of right rotator cuff, initial encounter           Plan:   Results of laboratory review demonstrates M spike is decreased to patient does have a history of hepatitis C is not been seen by GI since 2015 recommend reevaluation with new introduction of otitis see drugs.  In addition right shoulder pain referral orthopedics recent x-rays demonstrates no abnormality yes and detectable discussed applications with patient follow-up in one year for MGUS to

## 2017-09-18 ENCOUNTER — OFFICE VISIT (OUTPATIENT)
Dept: GASTROENTEROLOGY | Facility: CLINIC | Age: 67
End: 2017-09-18
Payer: MEDICARE

## 2017-09-18 VITALS
SYSTOLIC BLOOD PRESSURE: 124 MMHG | HEIGHT: 73 IN | WEIGHT: 193.13 LBS | BODY MASS INDEX: 25.6 KG/M2 | HEART RATE: 78 BPM | DIASTOLIC BLOOD PRESSURE: 69 MMHG

## 2017-09-18 DIAGNOSIS — B18.2 CHRONIC HEPATITIS C WITHOUT HEPATIC COMA: Primary | ICD-10-CM

## 2017-09-18 PROCEDURE — 99999 PR PBB SHADOW E&M-EST. PATIENT-LVL III: CPT | Mod: PBBFAC,,, | Performed by: INTERNAL MEDICINE

## 2017-09-18 PROCEDURE — 1159F MED LIST DOCD IN RCRD: CPT | Mod: ,,, | Performed by: INTERNAL MEDICINE

## 2017-09-18 PROCEDURE — 99214 OFFICE O/P EST MOD 30 MIN: CPT | Mod: S$PBB,,, | Performed by: INTERNAL MEDICINE

## 2017-09-18 PROCEDURE — 1126F AMNT PAIN NOTED NONE PRSNT: CPT | Mod: ,,, | Performed by: INTERNAL MEDICINE

## 2017-09-18 PROCEDURE — 99213 OFFICE O/P EST LOW 20 MIN: CPT | Mod: PBBFAC | Performed by: INTERNAL MEDICINE

## 2017-09-18 NOTE — PROGRESS NOTES
Subjective:       Patient ID: Darnell Ruelas Jr. is a 67 y.o. male.    Chief Complaint: Hepatitis C    The patient is here for assessment for Hepatitis C. He is seeing Heme-Onc regarding MGUS and during the course of the evaluation he was noted to have abnormal LFTs. The patient was initially told he had Hepatitis C in  2008. He denies any history of IV drug use but feels he might have had a transfusion while in [place]. He has never been treated for this before. Despite this he had an abdominal U/S last year which suggested his liver was normal in size and echotexture with no masses.  CBC in August of the year showed a normal platelet count and HGB and WBC count.  On CMP his Albumin and total protein are also normal. There is a mildly elevated alpha fetoprotein, but this has been close to upper limit of normal and minimally elevated several times over the past few years and is likely why it has been followed.  The records we have in our system so his initial evaluation here revealed his genotype hepatitis C to be 1a.              Review of Systems   Constitutional: Negative.  Negative for activity change, appetite change, chills, diaphoresis, fatigue, fever and unexpected weight change.   HENT: Negative for congestion, ear discharge, facial swelling, hearing loss, nosebleeds, postnasal drip, sinus pressure, sneezing, tinnitus, trouble swallowing and voice change.    Eyes: Negative for photophobia, redness and visual disturbance.   Respiratory: Negative for cough, chest tightness, shortness of breath and wheezing.    Cardiovascular: Negative for chest pain and palpitations.   Gastrointestinal: Negative for abdominal distention, abdominal pain, blood in stool, constipation, diarrhea, nausea, rectal pain and vomiting.   Genitourinary: Negative for difficulty urinating, discharge, dysuria, flank pain, frequency, hematuria, scrotal swelling, testicular pain and urgency.   Musculoskeletal: Negative for arthralgias, back  pain, gait problem, joint swelling, myalgias and neck stiffness.        Right arm pain   Skin: Negative for color change, pallor, rash and wound.   Neurological: Negative for dizziness, tremors, seizures, syncope, facial asymmetry, speech difficulty, weakness, light-headedness, numbness and headaches.   Hematological: Negative for adenopathy. Does not bruise/bleed easily.   Psychiatric/Behavioral: Negative for agitation, confusion, hallucinations, sleep disturbance and suicidal ideas.       Objective:      Physical Exam   Constitutional: He is oriented to person, place, and time. He appears well-developed and well-nourished. No distress.   HENT:   Head: Normocephalic and atraumatic.   Nose: Nose normal.   Mouth/Throat: Oropharynx is clear and moist. No oropharyngeal exudate.   Eyes: Conjunctivae are normal. Pupils are equal, round, and reactive to light. No scleral icterus.   Neck: Normal range of motion. Neck supple. No thyromegaly present.   Cardiovascular: Normal rate and regular rhythm.  Exam reveals no gallop and no friction rub.    No murmur heard.  Pulmonary/Chest: Effort normal and breath sounds normal. No respiratory distress. He has no wheezes. He has no rales.   Abdominal: Soft. Bowel sounds are normal. He exhibits no distension and no mass. There is no tenderness. There is no rebound and no guarding.   Musculoskeletal: He exhibits no edema or tenderness.   Lymphadenopathy:     He has no cervical adenopathy.   Neurological: He is alert and oriented to person, place, and time. He exhibits normal muscle tone. Coordination normal.   Skin: Skin is warm. No rash noted. He is not diaphoretic.   Psychiatric: He has a normal mood and affect. His behavior is normal. Judgment and thought content normal.   Vitals reviewed.      Assessment:     Hepatitis C    MGUS  No diagnosis found.    Plan:     labs for reassessment of his present condition.  Treatment recommendations will follow.

## 2017-09-18 NOTE — LETTER
September 18, 2017      Hermes Mortensen MD  [address] [facility] [address]  [facility] [address]           [facility]  [address] [place] [address]  Phone: [phone]  Fax: [phone]          Patient: Darnell Ruelas Jr.   MR Number: [id number]   YOB: 1950   Date of Visit: 9/18/2017       Dear Dr. Hermes Mortensen:    Thank you for referring Darnell Ruelas to me for evaluation. Attached you will find relevant portions of my assessment and plan of care.    If you have questions, please do not hesitate to call me. I look forward to following Darnell Ruelas along with you.    Sincerely,    Ramesh Shahid III, MD    Enclosure  CC:  No Recipients    If you would like to receive this communication electronically, please contact [email or web address] or [phone] to request more information on [employer] access.    For providers and/or their staff who would like to refer a patient to [facility], please contact us through our one-stop-shop provider referral line, [facility], at [phone].    If you feel you have received this communication in error or would no longer like to receive these types of communications, please e-mail [email or web address]

## 2017-09-19 ENCOUNTER — TELEPHONE (OUTPATIENT)
Dept: RADIOLOGY | Facility: HOSPITAL | Age: 67
End: 2017-09-19

## 2017-09-20 ENCOUNTER — HOSPITAL ENCOUNTER (OUTPATIENT)
Dept: RADIOLOGY | Facility: HOSPITAL | Age: 67
Discharge: HOME | End: 2017-09-20
Attending: INTERNAL MEDICINE
Payer: MEDICARE

## 2017-09-20 ENCOUNTER — TELEPHONE (OUTPATIENT)
Dept: ORTHOPEDICS | Facility: CLINIC | Age: 67
End: 2017-09-20

## 2017-09-20 DIAGNOSIS — M25.511 RIGHT SHOULDER PAIN, UNSPECIFIED CHRONICITY: Primary | ICD-10-CM

## 2017-09-20 DIAGNOSIS — B18.2 CHRONIC HEPATITIS C WITHOUT HEPATIC COMA: ICD-10-CM

## 2017-09-20 PROCEDURE — 76700 US EXAM ABDOM COMPLETE: CPT | Mod: 26,,, | Performed by: RADIOLOGY

## 2017-09-20 PROCEDURE — 76700 US EXAM ABDOM COMPLETE: CPT | Mod: TC,PO

## 2017-09-20 NOTE — TELEPHONE ENCOUNTER
Pt contacted to clarify laterality of chief complaint, and to arrive 30 min early to appointment on 9/22/2017 at 1515 to complete xrays. Pt stated that he wanted to be seen for his RIGHT SHOULDER. Pt verbalized understanding.

## 2017-09-22 ENCOUNTER — TELEPHONE (OUTPATIENT)
Dept: ORTHOPEDICS | Facility: CLINIC | Age: 67
End: 2017-09-22

## 2017-09-22 DIAGNOSIS — B18.2 CHRONIC HEPATITIS C WITHOUT HEPATIC COMA: Primary | ICD-10-CM

## 2017-09-22 NOTE — TELEPHONE ENCOUNTER
Called pt to reschedule appt. Pt was unavailable. voicemail box was full not able to leave message.

## 2017-09-25 ENCOUNTER — PATIENT MESSAGE (OUTPATIENT)
Dept: GASTROENTEROLOGY | Facility: CLINIC | Age: 67
End: 2017-09-25

## 2017-09-25 DIAGNOSIS — B18.2 CHRONIC HEPATITIS C WITHOUT HEPATIC COMA: Primary | ICD-10-CM

## 2017-09-25 RX ORDER — LEDIPASVIR AND SOFOSBUVIR 90; 400 MG/1; MG/1
1 TABLET, FILM COATED ORAL DAILY
Qty: 30 TABLET | Refills: 2 | Status: SHIPPED | OUTPATIENT
Start: 2017-09-25 | End: 2018-03-06 | Stop reason: ALTCHOICE

## 2017-09-26 ENCOUNTER — TELEPHONE (OUTPATIENT)
Dept: PHARMACY | Facility: CLINIC | Age: 67
End: 2017-09-26

## 2017-09-26 ENCOUNTER — HOSPITAL ENCOUNTER (OUTPATIENT)
Dept: RADIOLOGY | Facility: HOSPITAL | Age: 67
Discharge: HOME | End: 2017-09-26
Attending: PHYSICIAN ASSISTANT
Payer: MEDICARE

## 2017-09-26 ENCOUNTER — OFFICE VISIT (OUTPATIENT)
Dept: ORTHOPEDICS | Facility: CLINIC | Age: 67
End: 2017-09-26
Payer: MEDICARE

## 2017-09-26 VITALS
DIASTOLIC BLOOD PRESSURE: 64 MMHG | SYSTOLIC BLOOD PRESSURE: 95 MMHG | HEIGHT: 73 IN | WEIGHT: 193.13 LBS | BODY MASS INDEX: 25.6 KG/M2 | HEART RATE: 61 BPM

## 2017-09-26 DIAGNOSIS — M25.511 RIGHT SHOULDER PAIN, UNSPECIFIED CHRONICITY: ICD-10-CM

## 2017-09-26 DIAGNOSIS — M25.511 ACUTE PAIN OF RIGHT SHOULDER: Primary | ICD-10-CM

## 2017-09-26 DIAGNOSIS — M75.41 IMPINGEMENT SYNDROME OF RIGHT SHOULDER: ICD-10-CM

## 2017-09-26 DIAGNOSIS — M75.81 RIGHT ROTATOR CUFF TENDINITIS: ICD-10-CM

## 2017-09-26 PROCEDURE — 1159F MED LIST DOCD IN RCRD: CPT | Mod: ,,, | Performed by: ORTHOPAEDIC SURGERY

## 2017-09-26 PROCEDURE — 99999 PR PBB SHADOW E&M-EST. PATIENT-LVL III: CPT | Mod: PBBFAC,,, | Performed by: ORTHOPAEDIC SURGERY

## 2017-09-26 PROCEDURE — 99214 OFFICE O/P EST MOD 30 MIN: CPT | Mod: 57,S$PBB,, | Performed by: ORTHOPAEDIC SURGERY

## 2017-09-26 PROCEDURE — 1125F AMNT PAIN NOTED PAIN PRSNT: CPT | Mod: ,,, | Performed by: ORTHOPAEDIC SURGERY

## 2017-09-26 PROCEDURE — 73030 X-RAY EXAM OF SHOULDER: CPT | Mod: 26,RT,, | Performed by: RADIOLOGY

## 2017-09-26 PROCEDURE — 99213 OFFICE O/P EST LOW 20 MIN: CPT | Mod: PBBFAC,25,PO | Performed by: ORTHOPAEDIC SURGERY

## 2017-09-26 PROCEDURE — 73030 X-RAY EXAM OF SHOULDER: CPT | Mod: TC,PO,RT

## 2017-09-26 NOTE — LETTER
September 26, 2017      Hermes Mortensen MD  [address] [facility] [place] [address]           [facility]  [address]  [place] [address]  Phone: [phone]  Fax: [phone]          Patient: Darnell Ruelas Jr.   MR Number: [id number]   YOB: 1950   Date of Visit: 9/26/2017       Dear Dr. Hermes Mortensen:    Thank you for referring Darnell Ruelas to me for evaluation. Attached you will find relevant portions of my assessment and plan of care.    If you have questions, please do not hesitate to call me. I look forward to following Darnell Ruelas along with you.    Sincerely,    Laurence Franco LPN    Enclosure  CC:  No Recipients    If you would like to receive this communication electronically, please contact [email or web address] or [phone] to request more information on [employer] access.    For providers and/or their staff who would like to refer a patient to [facility], please contact us through our one-stop-shop provider referral line, [facility], at [phone].    If you feel you have received this communication in error or would no longer like to receive these types of communications, please e-mail [email or web address]

## 2017-09-26 NOTE — TELEPHONE ENCOUNTER
[facility] received a prescription for Harvoni and it will require a prior authorization with their insurance company. We will update patient of status as more information is received.

## 2017-09-26 NOTE — PROGRESS NOTES
CC: This is a 67-year-old male that complains of right shoulder pain for 9 months.    HPI: The patient states that the pain interferes with her activities of daily living and is rated a 7 out of 10.  He indicates that there are certain positions he puts his shoulder and that causes a significant amount of pain.    PMH:    Past Medical History:   Diagnosis Date    Chronic hepatitis C     Hypercholesteremia     Kidney stones     Prostate cancer        PSH:    Past Surgical History:   Procedure Laterality Date    CHOLECYSTECTOMY      TONSILLECTOMY         Family Hx:  No family history on file.    Allergy:    Review of patient's allergies indicates:   Allergen Reactions    No known allergies        Medication:    Current Outpatient Prescriptions:     cetirizine (ZYRTEC) 10 MG tablet, Take 1 tablet (10 mg total) by mouth once daily., Disp: 90 tablet, Rfl: 2    diclofenac sodium 20 mg/gram /actuation(2 %) sopm, Apply 40 mg topically 2 (two) times daily., Disp: 1 Bottle, Rfl: 5    ERGOCALCIFEROL, VITAMIN D2, (VITAMIN D ORAL), Take 1 tablet by mouth once daily. , Disp: , Rfl:     fish oil-omega-3 fatty acids 300-1,000 mg capsule, Take 2 g by mouth once daily., Disp: , Rfl:     fluticasone (FLONASE) 50 mcg/actuation nasal spray, 2 sprays by Each Nare route once daily., Disp: 16 Bottle, Rfl: 1    gabapentin (NEURONTIN) 300 MG capsule, Take 1 capsule (300 mg total) by mouth every evening. Nerve irritation, Disp: 30 capsule, Rfl: 1    glucosamine-chondroitin 500-400 mg tablet, Take 1 tablet by mouth 2 (two) times daily., Disp: , Rfl:     ledipasvir-sofosbuvir  mg Tab, Take 1 each by mouth once daily., Disp: 30 tablet, Rfl: 2    multivitamin capsule, Take 1 capsule by mouth once daily., Disp: , Rfl:     nystatin (MYCOSTATIN) powder, Apply topically 2 (two) times daily., Disp: 60 g, Rfl: 0    simvastatin (ZOCOR) 20 MG tablet, Take 1 tablet (20 mg total) by mouth every evening., Disp: 90 tablet, Rfl: 3     "tramadol (ULTRAM) 50 mg tablet, Take 1 tablet (50 mg total) by mouth every 6 (six) hours as needed for Pain., Disp: 40 tablet, Rfl: 0    vardenafil (LEVITRA) 20 MG tablet, Take 20 mg by mouth., Disp: , Rfl:     vardenafil (LEVITRA) 20 MG tablet, Take 20 mg by mouth., Disp: , Rfl:     Social History:    Social History     Social History    Marital status:      Spouse name: N/A    Number of children: N/A    Years of education: N/A     Occupational History    Not on file.     Social History Main Topics    Smoking status: Former Smoker    Smokeless tobacco: Former User    Alcohol use 0.0 oz/week      Comment: rarely    Drug use: No    Sexual activity: Yes     Other Topics Concern    Not on file     Social History Narrative    No narrative on file       Vitals:   BP 95/64   Pulse 61   Ht 6' 1" (1.854 m)   Wt 87.6 kg (193 lb 2 oz)   BMI 25.48 kg/m²      ROS:  GENERAL: No fever, chills, fatigability or weight loss.  SKIN: No rashes, itching or changes in color or texture of skin.  HEAD: No headaches or recent head trauma.  EYES: Visual acuity fine. No photophobia, ocular pain or diplopia.  EARS: Denies ear pain, discharge or vertigo.  NOSE: No loss of smell, no epistaxis or postnasal drip.  MOUTH & THROAT: No hoarseness or change in voice. No excessive gum bleeding.  NODES: Denies swollen glands.  CHEST: Denies ALVES, cyanosis, wheezing, cough and sputum production.  CARDIOVASCULAR: Denies chest pain, PND, orthopnea or reduced exercise tolerance.  ABDOMEN: Appetite fine. No weight loss. Denies diarrhea, abdominal pain, hematemesis or blood in stool.  URINARY: No flank pain, dysuria or hematuria.  PERIPHERAL VASCULAR: No claudication or cyanosis.  NEUROLOGIC: No history of seizures, paralysis, alteration of gait or coordination.  MUSCULOSKELETAL: See HPI    PE:  APPEARANCE: Well nourished, well developed, in no acute distress.   HEAD: Normocephalic, atraumatic.  EYES: PERRL. EOMI.   EARS: TM's intact. " Light reflex normal. No retraction or perforation.   NOSE: Mucosa pink. Airway clear.  MOUTH & THROAT: No tonsillar enlargement. No pharyngeal erythema or exudate. No stridor.  NECK: Supple.   NODES: No cervical, axillary or inguinal lymph node enlargement.  CHEST: Lungs clear to auscultation.  CARDIOVASCULAR: Normal S1, S2. No rubs, murmurs or gallops.  ABDOMEN: Bowel sounds normal. Not distended. Soft. No tenderness or masses.  NEUROLOGIC: Cranial Nerves: II-XII grossly intact, also see MUSCULOSKELETAL  MUSCULOSKELETAL:     right Shoulder Exam     Muscle Appearance:Normal  Grooming:Normal  Spine Alignment-Normal  Muscle Atrophy-No  Deformities-No  Tenderness-Yes  Paresthesias-No  Range of Motion-decreased         Abd Pure-decreased         Abd Combined-decreased         Ext-decreased         Flex-decreased         Internal Rot-decreased         External Rot-decreased  Muscle Strength-decreased  Sensation-normal  Reflexes-Normal  Crepitus-Yes  Impingement-Yes  Apprehension-Yes  Fatigue-Yes  Scapular Winging-No  Muscle Tightness-Yes  Instability-No  Tests on Exam-no evidence of instability  Neurovascular Status-Normal  Skin-Normal  Positive Drop Arm testNo    Assessment:           Diagnosis:              1.right shoulder                    Diagnostic Studies  MRI-Yes, right shoulder  X-Ray-No  EMG/NCV-No  Arthrogram-No  Bone Scan-No  CT Scan-No  Doppler-No  ESR-No  CRP-No  CBC with Diff-No   Rheumatoid/Arthritis Panel-No      Plan:                                                 1. PT-no                                                 2.OT-yes                                          3.NSAID-yes                                        4. Narcotics-no                                     5. Wound care-N/A                                 6. Rest-yes                                           7. Surgery-yes, I do recommend the patient undergo a right shoulder arthroscope.                                         8. LEN  Hose-no                                    9. Anticoagulation therapy-no               10. Elevation-no                                     11. Crutches-no                                    12. Walker-no             13. Cane no                        14. Referral-no                                     15.Injection-no                            16. Splint   /    Cast   /   Cast Shoe-No              17. RICE-none            18. Follow up- 3 weeks

## 2017-09-27 PROBLEM — M25.511 ACUTE PAIN OF RIGHT SHOULDER: Status: ACTIVE | Noted: 2017-09-27

## 2017-09-27 PROBLEM — M75.81 RIGHT ROTATOR CUFF TENDINITIS: Status: ACTIVE | Noted: 2017-09-27

## 2017-09-27 PROBLEM — M75.41 IMPINGEMENT SYNDROME OF RIGHT SHOULDER: Status: ACTIVE | Noted: 2017-09-27

## 2017-09-27 NOTE — PATIENT INSTRUCTIONS
Arthralgia    Arthralgia is the term for pain in or around the joint. It is a symptom, not a disease. This pain may involve one or more joints. In some cases, the pain moves from joint to joint.  There are many causes for joint pain. These include:  · Injury  · Osteoarthritis (wearing out of the joint surface)  · Gout (inflammation of the joint due to crystals in the joint fluid)  · Infection inside the joint    · Bursitis (inflammation of the fluid-filled sacs around the joint)  · Autoimmune disorders such as rheumatoid arthritis or lupus  · Tendonitis (inflammation of chords that attach muscle to bone)  Home care  · Rest the involved joint(s) until your symptoms improve.   · You may be prescribed pain medicine. If none is prescribed, you may use acetaminophen or ibuprofen to control pain and inflammation.  Follow-up care  Follow up with your healthcare provider or as advised.  When to seek medical advice  Contact your healthcare provider right away if any of the following occurs:  · Pain, swelling, or redness of joint increases  · Pain worsens or recurs after a period of improvement  · Pain moves to other joints  · You cannot bear weight on the affected joint   · You cannot move the affected joint  · Joint appears deformed  · New rash appears  · Fever of 100.4ºF (38ºC) or higher, or as directed by your healthcare provider  Date Last Reviewed: 3/1/2017  © 3881-9738 [employer]. [address] [place] [address], [place] [address]. All rights reserved. This information is not intended as a substitute for professional medical care. Always follow your healthcare professional's instructions.

## 2017-09-29 ENCOUNTER — TELEPHONE (OUTPATIENT)
Dept: GASTROENTEROLOGY | Facility: CLINIC | Age: 67
End: 2017-09-29

## 2017-09-29 DIAGNOSIS — B18.2 CHRONIC HEPATITIS C WITHOUT HEPATIC COMA: Primary | ICD-10-CM

## 2017-09-29 NOTE — TELEPHONE ENCOUNTER
Can you put in orders for liver biopsy. You put in the patient chart that  He need a liver biopsy  but no orders are in.

## 2017-09-29 NOTE — TELEPHONE ENCOUNTER
OCUMENTATION ONLY:   Prior authorization for Anyi approved from 08/29/2017 to 09/28/2018 x 12 weeks of treatment.   Case ID# [id number]   Co-pay: $24.00     Patient Assistance IS NOT required.     Preferred Specialty Pharmacy   [facility] (already on patient's profile)

## 2017-09-29 NOTE — TELEPHONE ENCOUNTER
We'd better discuss this before scheduling. Set up follow-up. He also needs repeat labs before coming in. GH

## 2017-10-10 ENCOUNTER — OFFICE VISIT (OUTPATIENT)
Dept: GASTROENTEROLOGY | Facility: CLINIC | Age: 67
End: 2017-10-10
Payer: MEDICARE

## 2017-10-10 ENCOUNTER — LAB VISIT (OUTPATIENT)
Dept: LAB | Facility: HOSPITAL | Age: 67
End: 2017-10-10
Attending: INTERNAL MEDICINE
Payer: MEDICARE

## 2017-10-10 ENCOUNTER — TELEPHONE (OUTPATIENT)
Dept: RADIOLOGY | Facility: HOSPITAL | Age: 67
End: 2017-10-10

## 2017-10-10 VITALS
HEART RATE: 78 BPM | HEIGHT: 73 IN | DIASTOLIC BLOOD PRESSURE: 78 MMHG | WEIGHT: 193.13 LBS | SYSTOLIC BLOOD PRESSURE: 110 MMHG | BODY MASS INDEX: 25.6 KG/M2

## 2017-10-10 DIAGNOSIS — B18.2 CHRONIC HEPATITIS C WITHOUT HEPATIC COMA: ICD-10-CM

## 2017-10-10 DIAGNOSIS — K74.60 CIRRHOSIS OF LIVER WITHOUT ASCITES, UNSPECIFIED HEPATIC CIRRHOSIS TYPE: ICD-10-CM

## 2017-10-10 DIAGNOSIS — B18.2 CHRONIC HEPATITIS C WITHOUT HEPATIC COMA: Primary | ICD-10-CM

## 2017-10-10 LAB
ALBUMIN SERPL BCP-MCNC: 3.8 G/DL
ALP SERPL-CCNC: 81 U/L
ALT SERPL W/O P-5'-P-CCNC: 44 U/L
ANION GAP SERPL CALC-SCNC: 13 MMOL/L
AST SERPL-CCNC: 47 U/L
BILIRUB DIRECT SERPL-MCNC: 0.2 MG/DL
BILIRUB SERPL-MCNC: 0.6 MG/DL
BUN SERPL-MCNC: 14 MG/DL
CALCIUM SERPL-MCNC: 9.4 MG/DL
CHLORIDE SERPL-SCNC: 104 MMOL/L
CO2 SERPL-SCNC: 25 MMOL/L
CREAT SERPL-MCNC: 1.1 MG/DL
EST. GFR  (AFRICAN AMERICAN): >60 ML/MIN/1.73 M^2
EST. GFR  (NON AFRICAN AMERICAN): >60 ML/MIN/1.73 M^2
GLUCOSE SERPL-MCNC: 89 MG/DL
INR PPP: 1
POTASSIUM SERPL-SCNC: 4 MMOL/L
PROT SERPL-MCNC: 7.7 G/DL
PROTHROMBIN TIME: 10.9 SEC
SODIUM SERPL-SCNC: 142 MMOL/L

## 2017-10-10 PROCEDURE — 99213 OFFICE O/P EST LOW 20 MIN: CPT | Mod: S$PBB,,, | Performed by: INTERNAL MEDICINE

## 2017-10-10 PROCEDURE — 99999 PR PBB SHADOW E&M-EST. PATIENT-LVL II: CPT | Mod: PBBFAC,,, | Performed by: INTERNAL MEDICINE

## 2017-10-10 PROCEDURE — 80076 HEPATIC FUNCTION PANEL: CPT

## 2017-10-10 PROCEDURE — 99212 OFFICE O/P EST SF 10 MIN: CPT | Mod: PBBFAC | Performed by: INTERNAL MEDICINE

## 2017-10-10 PROCEDURE — 36415 COLL VENOUS BLD VENIPUNCTURE: CPT

## 2017-10-10 PROCEDURE — 85610 PROTHROMBIN TIME: CPT

## 2017-10-10 PROCEDURE — 80048 BASIC METABOLIC PNL TOTAL CA: CPT

## 2017-10-11 ENCOUNTER — HOSPITAL ENCOUNTER (OUTPATIENT)
Dept: RADIOLOGY | Facility: HOSPITAL | Age: 67
Discharge: HOME | End: 2017-10-11
Attending: ORTHOPAEDIC SURGERY
Payer: MEDICARE

## 2017-10-11 ENCOUNTER — PATIENT MESSAGE (OUTPATIENT)
Dept: GASTROENTEROLOGY | Facility: CLINIC | Age: 67
End: 2017-10-11

## 2017-10-11 DIAGNOSIS — B18.2 CHRONIC HEPATITIS C WITHOUT HEPATIC COMA: Primary | ICD-10-CM

## 2017-10-11 DIAGNOSIS — M25.511 ACUTE PAIN OF RIGHT SHOULDER: ICD-10-CM

## 2017-10-11 PROCEDURE — 73221 MRI JOINT UPR EXTREM W/O DYE: CPT | Mod: TC,PO,RT

## 2017-10-11 PROCEDURE — 73221 MRI JOINT UPR EXTREM W/O DYE: CPT | Mod: 26,RT,, | Performed by: RADIOLOGY

## 2017-10-26 ENCOUNTER — OFFICE VISIT (OUTPATIENT)
Dept: ORTHOPEDICS | Facility: CLINIC | Age: 67
End: 2017-10-26
Payer: MEDICARE

## 2017-10-26 VITALS
HEART RATE: 62 BPM | SYSTOLIC BLOOD PRESSURE: 133 MMHG | DIASTOLIC BLOOD PRESSURE: 87 MMHG | BODY MASS INDEX: 25.6 KG/M2 | HEIGHT: 73 IN | RESPIRATION RATE: 15 BRPM | WEIGHT: 193.13 LBS

## 2017-10-26 DIAGNOSIS — M77.8 RIGHT SHOULDER TENDINITIS: Primary | ICD-10-CM

## 2017-10-26 DIAGNOSIS — M19.011 ARTHRITIS OF RIGHT SHOULDER REGION: ICD-10-CM

## 2017-10-26 PROCEDURE — 99214 OFFICE O/P EST MOD 30 MIN: CPT | Mod: S$PBB,,, | Performed by: ORTHOPAEDIC SURGERY

## 2017-10-26 PROCEDURE — 99213 OFFICE O/P EST LOW 20 MIN: CPT | Mod: PBBFAC,PO | Performed by: ORTHOPAEDIC SURGERY

## 2017-10-26 PROCEDURE — 99999 PR PBB SHADOW E&M-EST. PATIENT-LVL III: CPT | Mod: PBBFAC,,, | Performed by: ORTHOPAEDIC SURGERY

## 2017-10-27 ENCOUNTER — TELEPHONE (OUTPATIENT)
Dept: GASTROENTEROLOGY | Facility: CLINIC | Age: 67
End: 2017-10-27

## 2017-10-27 PROBLEM — M19.011 ARTHRITIS OF RIGHT SHOULDER REGION: Status: ACTIVE | Noted: 2017-10-27

## 2017-10-27 PROBLEM — M77.8 RIGHT SHOULDER TENDINITIS: Status: ACTIVE | Noted: 2017-10-27

## 2017-10-27 NOTE — PROGRESS NOTES
CC: This is a 67-year-old male that complains of right shoulder pain for 9 months.    HPI: The patient states that the pain interferes with her activities of daily living and is rated a 7 out of 10.  He indicates that there are certain positions he puts his shoulder and that causes a significant amount of pain.    PMH:    Past Medical History:   Diagnosis Date    Chronic hepatitis C     Hypercholesteremia     Kidney stones     Prostate cancer        PSH:    Past Surgical History:   Procedure Laterality Date    CHOLECYSTECTOMY      TONSILLECTOMY         Family Hx:  History reviewed. No pertinent family history.    Allergy:    Review of patient's allergies indicates:   Allergen Reactions    No known allergies        Medication:    Current Outpatient Prescriptions:     cetirizine (ZYRTEC) 10 MG tablet, Take 1 tablet (10 mg total) by mouth once daily., Disp: 90 tablet, Rfl: 2    diclofenac sodium 20 mg/gram /actuation(2 %) sopm, Apply 40 mg topically 2 (two) times daily., Disp: 1 Bottle, Rfl: 5    ERGOCALCIFEROL, VITAMIN D2, (VITAMIN D ORAL), Take 1 tablet by mouth once daily. , Disp: , Rfl:     fish oil-omega-3 fatty acids 300-1,000 mg capsule, Take 2 g by mouth once daily., Disp: , Rfl:     fluticasone (FLONASE) 50 mcg/actuation nasal spray, 2 sprays by Each Nare route once daily., Disp: 16 Bottle, Rfl: 1    gabapentin (NEURONTIN) 300 MG capsule, Take 1 capsule (300 mg total) by mouth every evening. Nerve irritation, Disp: 30 capsule, Rfl: 1    glucosamine-chondroitin 500-400 mg tablet, Take 1 tablet by mouth 2 (two) times daily., Disp: , Rfl:     ledipasvir-sofosbuvir  mg Tab, Take 1 each by mouth once daily., Disp: 30 tablet, Rfl: 2    multivitamin capsule, Take 1 capsule by mouth once daily., Disp: , Rfl:     nystatin (MYCOSTATIN) powder, Apply topically 2 (two) times daily., Disp: 60 g, Rfl: 0    simvastatin (ZOCOR) 20 MG tablet, Take 1 tablet (20 mg total) by mouth every evening., Disp: 90  "tablet, Rfl: 3    tramadol (ULTRAM) 50 mg tablet, Take 1 tablet (50 mg total) by mouth every 6 (six) hours as needed for Pain., Disp: 40 tablet, Rfl: 0    vardenafil (LEVITRA) 20 MG tablet, Take 20 mg by mouth., Disp: , Rfl:     Social History:    Social History     Social History    Marital status:      Spouse name: N/A    Number of children: N/A    Years of education: N/A     Occupational History    Not on file.     Social History Main Topics    Smoking status: Former Smoker    Smokeless tobacco: Former User    Alcohol use 0.0 oz/week      Comment: rarely    Drug use: No    Sexual activity: Yes     Other Topics Concern    Not on file     Social History Narrative    No narrative on file       Vitals:   /87   Pulse 62   Resp 15   Ht 6' 1" (1.854 m)   Wt 87.6 kg (193 lb 2 oz)   BMI 25.48 kg/m²      ROS:  GENERAL: No fever, chills, fatigability or weight loss.  SKIN: No rashes, itching or changes in color or texture of skin.  HEAD: No headaches or recent head trauma.  EYES: Visual acuity fine. No photophobia, ocular pain or diplopia.  EARS: Denies ear pain, discharge or vertigo.  NOSE: No loss of smell, no epistaxis or postnasal drip.  MOUTH & THROAT: No hoarseness or change in voice. No excessive gum bleeding.  NODES: Denies swollen glands.  CHEST: Denies ALVES, cyanosis, wheezing, cough and sputum production.  CARDIOVASCULAR: Denies chest pain, PND, orthopnea or reduced exercise tolerance.  ABDOMEN: Appetite fine. No weight loss. Denies diarrhea, abdominal pain, hematemesis or blood in stool.  URINARY: No flank pain, dysuria or hematuria.  PERIPHERAL VASCULAR: No claudication or cyanosis.  NEUROLOGIC: No history of seizures, paralysis, alteration of gait or coordination.  MUSCULOSKELETAL: See HPI    PE:  APPEARANCE: Well nourished, well developed, in no acute distress.   HEAD: Normocephalic, atraumatic.  EYES: PERRL. EOMI.   EARS: TM's intact. Light reflex normal. No retraction or " perforation.   NOSE: Mucosa pink. Airway clear.  MOUTH & THROAT: No tonsillar enlargement. No pharyngeal erythema or exudate. No stridor.  NECK: Supple.   NODES: No cervical, axillary or inguinal lymph node enlargement.  CHEST: Lungs clear to auscultation.  CARDIOVASCULAR: Normal S1, S2. No rubs, murmurs or gallops.  ABDOMEN: Bowel sounds normal. Not distended. Soft. No tenderness or masses.  NEUROLOGIC: Cranial Nerves: II-XII grossly intact, also see MUSCULOSKELETAL  MUSCULOSKELETAL:     right Shoulder Exam     Muscle Appearance:Normal  Grooming:Normal  Spine Alignment-Normal  Muscle Atrophy-No  Deformities-No  Tenderness-Yes  Paresthesias-No  Range of Motion-decreased         Abd Pure-decreased         Abd Combined-decreased         Ext-decreased         Flex-decreased         Internal Rot-decreased         External Rot-decreased  Muscle Strength-decreased  Sensation-normal  Reflexes-Normal  Crepitus-Yes  Impingement-Yes  Apprehension-Yes  Fatigue-Yes  Scapular Winging-No  Muscle Tightness-Yes  Instability-No  Tests on Exam-no evidence of instability  Neurovascular Status-Normal  Skin-Normal  Positive Drop Arm testNo    Assessment:  I have discussed the findings on the shoulder MRI.  I recommend the patient undergo a shoulder arthroscopy.  He has no further questions during this visit.           Diagnosis:              1.right shoulder                    Diagnostic Studies  MRI-no  X-Ray-No  EMG/NCV-No  Arthrogram-No  Bone Scan-No  CT Scan-No  Doppler-No  ESR-No  CRP-No  CBC with Diff-No   Rheumatoid/Arthritis Panel-No      Plan:                                                 1. PT-no                                                 2.OT-yes                                          3.NSAID-yes                                        4. Narcotics-no                                     5. Wound care-N/A                                 6. Rest-yes                                           7. Surgery-yes, I do  recommend the patient undergo a right shoulder arthroscope.                                         8. LEN Hose-no                                    9. Anticoagulation therapy-no               10. Elevation-no                                     11. Crutches-no                                    12. Walker-no             13. Cane no                        14. Referral-no                                     15.Injection-no                            16. Splint   /    Cast   /   Cast Shoe-No              17. RICE-none            18. Follow up- 3 weeks

## 2017-10-27 NOTE — TELEPHONE ENCOUNTER
----- Message from Bertin Peck sent at 10/27/2017  1:55 PM CDT -----  Contact: pt  He's calling in regards to [facility] [phone] (cell)

## 2017-10-27 NOTE — PATIENT INSTRUCTIONS
Arthritis: Exercise     Look for exercise classes for arthritis in your community.     Exercise is important to your overall health. It is especially important in people with arthritis. Regular exercise can:  · Keep your heart and blood vessels healthy  · Help with weight management, or weight loss  · Improve your mood  · Help prevent and manage health problems such as:  ¨ Diabetes  ¨ High blood pressure  ¨ High cholesterol  ¨ Depression  In people with arthritis, it offers all of those benefits and it can:  · Lessen pain and stiffness  · Strengthen muscles that support your joints  · Help you to be able to do the things you enjoy  Exercise and arthritis  Exercise is an important part of any arthritis treatment plan. A complete program consists of the following three types of exercises:  · Aerobic exercises for cardiovascular health and overall fitness.   · Strengthening exercises to build up muscles to help prevent injury and keep joints stable.  · Range-of-motion exercises to keep muscles and joints flexible.  Getting started  Talk with your healthcare provider about what is safe for you. Make sure you:  · Learn how to do exercises properly and safely. Consider talking with a physical therapist or  used to working with people with arthritis.  · Start gradually and build. If you haven't been exercising, start slowly. Don't exercise too hard or too long.  · Create a routine. Set aside specific times for exercise every day.  · Warm up carefully. Take 5 to 10 minutes at the beginning and end of exercising to warm up and cool down. Just do the same exercises at a slower pace for 5 to 10 minutes.  · Work at a comfortable, smooth pace. Move your joints gently to prevent injury.  · Pay attention to your body. Don't exercise a painful or swollen joint; switch to another activity. Follow the 2-hour pain rule: You did too much if your joint or muscle pain lasts 2 hours or more after exercising, or is worse the next  day. This doesn't mean you should stop exercising. Just do less.  Aerobic exercise  Aerobic exercise improves overall health and helps control weight. Choose those that don't add extra stress to your joints. For example, walking, swimming, or bicycling.  Most people should exercise for at least 30 minutes. most days of the week. You don't have to exercise all at once. Try exercising for 10 minutes, 3 times a day, for example.  Strengthening exercises  Strengthening your muscles help to protect your joints and prevent injuries. Try to do strengthening exercises 2 to 3 times a week:  · These exercises can be done with exercise or resistance bands (inexpensive exercise aids that add resistance), or with light weights. Some people use soup cans as weights.  · Isometric exercises are done by tightening the muscles without moving the joint. This may be a good way to strengthen the muscles around a stiff joint.  A physical therapist or  can teach you how to do these exercises.  Range-of-motion (ROM) exercises  Range-of-motion (ROM) exercises allow you to move each of your joints in every way they are intended to move. You should do ROM exercises for each joint 2 to 3 times a day. This will help you maintain full use of all of your joints.  Sample ROM exercises  The following are just a sample of ROM exercises--one for your neck, shoulders, elbows, hips, knees, and ankles. To completely move each joint through its full range of motion, you will have to do a few exercises for each joint. A physical therapist or  can teach you how to do full ROM exercises for each joint.   Repeat each for these exercises 5 to 10 times. Make sure you move slowly:  1. Neck turns. Sit in a straight-backed chair. Look straight ahead. Slowly turn your head to the right, then return it to center. Repeat. Do the same thing, turning your head to the left. Repeat.  2. Shoulder raise. Lie on your back or sit in a chair. Raise one arm over  your head, keeping your elbow straight. Keep the arm close to your ear. Return it slowly to your side. Repeat with your other arm.  3. Elbow stretch. Sit in a chair. If you are able, put both arms out to your sides to form a T. Slowly touch your shoulders with the tips of your fingers. Then return to the T-position. Repeat.  4. Hip stretch. Lie on your back with your legs straight and about 6 inches apart. With your foot flexed, slide your leg out to the side, then slide it back to the starting position. Repeat with your other leg.  5. Knee bend. Sit in a chair with your legs bent at the knees in front of you. Straighten one leg as much as you can, then bring it back to the floor. Repeat this 5 to 10 times. Then do the same thing with the other leg.   6. Ankle stretch. Sit with your feet flat on the floor. Lift your toes off of the floor while your heels stay down. Repeat. Then lift your heels off the floor while your toes stay down. Repeat.  Other exercise  Many other exercise and activities benefit people with arthritis. It is most important to find exercise and activities that you enjoy. You might try:  · Yoga, including chair yoga, helps to keep your joints strong and flexible.   · Ayush Chi, an ancient type of exercise with slow, gentle movements  · Water exercise, including water walking  For more information on exercise for arthritis go to the Ar[employer] website: www.arthritis.org.  Date Last Reviewed: 2/14/2016  © 9383-8884 [employer]. [address] [place] [address], [place] [address]. All rights reserved. This information is not intended as a substitute for professional medical care. Always follow your healthcare professional's instructions.

## 2017-10-30 NOTE — PROGRESS NOTES
Subjective:       Patient ID: Darnell Ruelas Jr. is a 67 y.o. male.    Chief Complaint: Follow-up    The patient is here for follow-up of hepatitis C.  He was last seen on September 18 and labs were ordered to further clarify the degree of damage to the patient's liver so further recommendations can be made.  His viral load was between 5 and 6 million international units.  His fibrosure study was suspicious for cirrhosis.  His abdominal ultrasound revealed a heterogeneous, increased echotexture but was otherwise unremarkable.  There was no splenomegaly.  There was no ascites.    The implications of these findings were discussed with the patient in some detail.  He will require treatment for his hepatitis C for 3 months.  His genotype has previously been documented as being 1a, which makes him a candidate for follow-up on any treatment.  We will check with his insurance company regarding coverage.  We have also evaluated the patient for previous exposure to hepatitis A and B.  He has antibody to hepatitis A, but his hepatitis B antibody studies are negative.  He will need hepatitis B vaccine.    I have also discussed consideration of liver biopsy in this patient with Dr. Chavira.  She recommends we treat his hepatitis C first, and then consider liver biopsy after that is completed.  This too relates to the patient.      Review of Systems   Constitutional: Negative.  Negative for activity change, appetite change, chills, diaphoresis, fatigue, fever and unexpected weight change.   HENT: Negative for congestion, ear discharge, facial swelling, hearing loss, nosebleeds, postnasal drip, sinus pressure, sneezing, tinnitus, trouble swallowing and voice change.    Eyes: Negative for photophobia, redness and visual disturbance.   Respiratory: Negative for cough, chest tightness, shortness of breath and wheezing.    Cardiovascular: Negative for chest pain and palpitations.   Gastrointestinal: Negative for abdominal distention,  abdominal pain, blood in stool, constipation, diarrhea, nausea, rectal pain and vomiting.   Genitourinary: Negative for difficulty urinating, discharge, dysuria, flank pain, frequency, hematuria, scrotal swelling, testicular pain and urgency.   Musculoskeletal: Negative for arthralgias, back pain, gait problem, joint swelling, myalgias and neck stiffness.   Skin: Negative for color change, pallor, rash and wound.   Neurological: Negative for dizziness, tremors, seizures, syncope, facial asymmetry, speech difficulty, weakness, light-headedness, numbness and headaches.   Hematological: Negative for adenopathy. Does not bruise/bleed easily.   Psychiatric/Behavioral: Negative for agitation, confusion, hallucinations, sleep disturbance and suicidal ideas.       Objective:      Physical Exam    Assessment:     Hepatitis C    Possible early cirrhosis  No diagnosis found.    Plan:     As outlined above.

## 2017-11-07 RX ORDER — CETIRIZINE HYDROCHLORIDE 10 MG/1
10 TABLET ORAL DAILY
Qty: 90 TABLET | Refills: 2 | Status: SHIPPED | OUTPATIENT
Start: 2017-11-07 | End: 2018-07-19 | Stop reason: SDUPTHER

## 2017-11-13 ENCOUNTER — TELEPHONE (OUTPATIENT)
Dept: GASTROENTEROLOGY | Facility: CLINIC | Age: 67
End: 2017-11-13

## 2017-11-13 NOTE — TELEPHONE ENCOUNTER
----- Message from Laila Hope sent at 11/13/2017  2:00 PM CST -----  Contact: Pt   Pt request call back about his medication. .[phone] (eycc)

## 2017-11-13 NOTE — TELEPHONE ENCOUNTER
Returned patients call and patient did not answer, A message was left for the patient to call back.

## 2017-11-14 ENCOUNTER — TELEPHONE (OUTPATIENT)
Dept: GASTROENTEROLOGY | Facility: CLINIC | Age: 67
End: 2017-11-14

## 2017-11-14 NOTE — TELEPHONE ENCOUNTER
----- Message from Gale Roman sent at 11/14/2017 10:18 AM CST -----  Contact: self   Patient returning call. Please call back at [phone].        Thanks,  Gale Roman

## 2017-11-16 ENCOUNTER — TELEPHONE (OUTPATIENT)
Dept: GASTROENTEROLOGY | Facility: CLINIC | Age: 67
End: 2017-11-16

## 2017-11-16 NOTE — TELEPHONE ENCOUNTER
Returned patients call and patient had questions about the Hep c medication he is suppose to be taking. Patient stated he would rather come in and speak with somebody. Patient scheduled appt to come in.

## 2017-11-16 NOTE — TELEPHONE ENCOUNTER
----- Message from Carito Olivia sent at 11/16/2017 12:33 PM CST -----  Contact: pt  The pt wants to be worked in before December for a medication refill appt, pt can be reached at [phone]///[email or web address]

## 2017-11-22 ENCOUNTER — OFFICE VISIT (OUTPATIENT)
Dept: GASTROENTEROLOGY | Facility: CLINIC | Age: 67
End: 2017-11-22
Payer: MEDICARE

## 2017-11-22 ENCOUNTER — LAB VISIT (OUTPATIENT)
Dept: LAB | Facility: HOSPITAL | Age: 67
End: 2017-11-22
Attending: NURSE PRACTITIONER
Payer: MEDICARE

## 2017-11-22 VITALS
HEIGHT: 73 IN | SYSTOLIC BLOOD PRESSURE: 132 MMHG | WEIGHT: 197.06 LBS | DIASTOLIC BLOOD PRESSURE: 92 MMHG | BODY MASS INDEX: 26.12 KG/M2 | HEART RATE: 68 BPM

## 2017-11-22 DIAGNOSIS — B18.2 CHRONIC HEPATITIS C WITHOUT HEPATIC COMA: ICD-10-CM

## 2017-11-22 DIAGNOSIS — B18.2 CHRONIC HEPATITIS C WITHOUT HEPATIC COMA: Primary | ICD-10-CM

## 2017-11-22 LAB
HBV CORE AB SERPL QL IA: NEGATIVE
HBV SURFACE AG SERPL QL IA: NEGATIVE
HEPATITIS A ANTIBODY, IGG: POSITIVE

## 2017-11-22 PROCEDURE — 99213 OFFICE O/P EST LOW 20 MIN: CPT | Mod: PBBFAC | Performed by: NURSE PRACTITIONER

## 2017-11-22 PROCEDURE — 87340 HEPATITIS B SURFACE AG IA: CPT

## 2017-11-22 PROCEDURE — 99999 PR PBB SHADOW E&M-EST. PATIENT-LVL III: CPT | Mod: PBBFAC,,, | Performed by: NURSE PRACTITIONER

## 2017-11-22 PROCEDURE — 86704 HEP B CORE ANTIBODY TOTAL: CPT

## 2017-11-22 PROCEDURE — 99213 OFFICE O/P EST LOW 20 MIN: CPT | Mod: S$PBB,,, | Performed by: NURSE PRACTITIONER

## 2017-11-22 PROCEDURE — 36415 COLL VENOUS BLD VENIPUNCTURE: CPT

## 2017-11-22 PROCEDURE — 86790 VIRUS ANTIBODY NOS: CPT

## 2017-11-24 DIAGNOSIS — B18.2 CHRONIC HEPATITIS C WITHOUT HEPATIC COMA: Primary | ICD-10-CM

## 2017-11-29 NOTE — PROGRESS NOTES
Clinic Follow Up:  [facility] Follow Up Note    Reason for Follow Up:  The encounter diagnosis was Chronic hepatitis C without hepatic coma.    PCP: Beto Godfrey       HPI:  This is a 67 y.o. male here for follow up for Hepatitis C treatment. He has been seeing Dr. Shahid for treatment of Hep C but this is my first visit with him. He was prescribed Harvoni but had multiple questions about the medication so had not started it yet. He had concerns about being tested for Hepatitis B as he read that this could be an issue. He had a previous negative Hep B surface Ab. He also had questions regarding interaction with current medications.     Review of Systems   Constitutional: Negative for activity change and appetite change.   HENT: Negative for sore throat and trouble swallowing.    Eyes: Negative for pain and discharge.   Respiratory: Negative for cough and chest tightness.    Cardiovascular: Negative for chest pain and palpitations.   Gastrointestinal: Negative for abdominal pain, anal bleeding, blood in stool, constipation, diarrhea, nausea, rectal pain and vomiting.   Genitourinary: Negative for dysuria, frequency and hematuria.   Skin: Negative for color change and rash.   Neurological: Negative for speech difficulty, weakness and headaches.   Psychiatric/Behavioral: Negative for confusion and sleep disturbance.       Medical History:  Past Medical History:   Diagnosis Date    Chronic hepatitis C     Hypercholesteremia     Kidney stones     Prostate cancer        Surgical History:   Past Surgical History:   Procedure Laterality Date    CHOLECYSTECTOMY      TONSILLECTOMY         Family History:   History reviewed. No pertinent family history.    Social History:   Social History   Substance Use Topics    Smoking status: Former Smoker    Smokeless tobacco: Former User    Alcohol use 0.0 oz/week      Comment: rarely       Allergies:   Review of patient's allergies indicates:  "  Allergen Reactions    No known allergies        Home Medications:  Current Outpatient Prescriptions on File Prior to Visit   Medication Sig Dispense Refill    cetirizine (ZYRTEC) 10 MG tablet Take 1 tablet (10 mg total) by mouth once daily. 90 tablet 2    diclofenac sodium 20 mg/gram /actuation(2 %) sopm Apply 40 mg topically 2 (two) times daily. 1 Bottle 5    ERGOCALCIFEROL, VITAMIN D2, (VITAMIN D ORAL) Take 1 tablet by mouth once daily.       fluticasone (FLONASE) 50 mcg/actuation nasal spray 2 sprays by Each Nare route once daily. 16 Bottle 1    glucosamine-chondroitin 500-400 mg tablet Take 1 tablet by mouth 2 (two) times daily.      multivitamin capsule Take 1 capsule by mouth once daily.      simvastatin (ZOCOR) 20 MG tablet Take 1 tablet (20 mg total) by mouth every evening. 90 tablet 3    vardenafil (LEVITRA) 20 MG tablet Take 20 mg by mouth.      ledipasvir-sofosbuvir  mg Tab Take 1 each by mouth once daily. 30 tablet 2    nystatin (MYCOSTATIN) powder Apply topically 2 (two) times daily. 60 g 0     No current facility-administered medications on file prior to visit.        Physical Exam:  Vital Signs:  BP (!) 132/92   Pulse 68   Ht 6' 1" (1.854 m)   Wt 89.4 kg (197 lb 1.5 oz)   BMI 26.00 kg/m²   Body mass index is 26 kg/m².  Physical Exam   Constitutional: He is oriented to person, place, and time and well-developed, well-nourished, and in no distress. No distress.   HENT:   Head: Normocephalic.   Eyes: Conjunctivae are normal. Pupils are equal, round, and reactive to light.   Cardiovascular: Normal rate, regular rhythm and normal heart sounds.    Pulmonary/Chest: Effort normal and breath sounds normal. No respiratory distress.   Abdominal: Soft. Bowel sounds are normal. He exhibits no distension. There is no tenderness.   Neurological: He is alert and oriented to person, place, and time. No cranial nerve deficit.   Skin: Skin is warm and dry. No rash noted.   Psychiatric: Mood and " affect normal.       Labs: Pertinent labs reviewed. Hepatitis serology     Assessment:  1. Chronic hepatitis C without hepatic coma        Recommendations:  All patient questions were addressed. Medication list reviewed and no interactions with Harvoini were identified. I will order additional serology for Hepatitis B and A. He will need immunization for Hep B +/- A. He may go ahead and start Harvoni. Patient left with no additional questions or concerns. He reports that he would like to have labs drawn 1 month after starting the medication. This is not unreasonable and will place lab order once Harvoni is started.   -     Hepatitis B core antibody, total; Future; Expected date: 11/22/2017  -     Hepatitis A antibody, IgG; Future; Expected date: 11/22/2017  -     Hepatitis B surface antigen; Future; Expected date: 11/22/2017    Return to Clinic:  After completion of medication.     Thank you for the opportunity to participate in the care of this patient.  RYAN Sepulveda

## 2017-12-18 ENCOUNTER — TELEPHONE (OUTPATIENT)
Dept: GASTROENTEROLOGY | Facility: CLINIC | Age: 67
End: 2017-12-18

## 2017-12-18 NOTE — TELEPHONE ENCOUNTER
----- Message from Shelia Colin sent at 12/18/2017 11:05 AM CST -----  Contact: pt  Please call pt [phone] regarding medication, pt have some questions.

## 2017-12-18 NOTE — TELEPHONE ENCOUNTER
----- Message from Shelia Colin sent at 12/18/2017 11:05 AM CST -----  Contact: pt  Please call pt [phone] regarding medication, pt have some questions.

## 2017-12-22 ENCOUNTER — TELEPHONE (OUTPATIENT)
Dept: GASTROENTEROLOGY | Facility: CLINIC | Age: 67
End: 2017-12-22

## 2017-12-22 ENCOUNTER — LAB VISIT (OUTPATIENT)
Dept: LAB | Facility: HOSPITAL | Age: 67
End: 2017-12-22
Attending: INTERNAL MEDICINE
Payer: MEDICARE

## 2017-12-22 DIAGNOSIS — B18.2 CHRONIC HEPATITIS C WITHOUT HEPATIC COMA: ICD-10-CM

## 2017-12-22 LAB
ALBUMIN SERPL BCP-MCNC: 3.5 G/DL
ALP SERPL-CCNC: 66 U/L
ALT SERPL W/O P-5'-P-CCNC: 28 U/L
ANION GAP SERPL CALC-SCNC: 10 MMOL/L
AST SERPL-CCNC: 44 U/L
BASOPHILS # BLD AUTO: 0.02 K/UL
BASOPHILS NFR BLD: 0.3 %
BILIRUB SERPL-MCNC: 0.7 MG/DL
BUN SERPL-MCNC: 16 MG/DL
CALCIUM SERPL-MCNC: 9 MG/DL
CHLORIDE SERPL-SCNC: 108 MMOL/L
CO2 SERPL-SCNC: 22 MMOL/L
CREAT SERPL-MCNC: 1.1 MG/DL
DIFFERENTIAL METHOD: ABNORMAL
EOSINOPHIL # BLD AUTO: 0.2 K/UL
EOSINOPHIL NFR BLD: 3.6 %
ERYTHROCYTE [DISTWIDTH] IN BLOOD BY AUTOMATED COUNT: 13 %
EST. GFR  (AFRICAN AMERICAN): >60 ML/MIN/1.73 M^2
EST. GFR  (NON AFRICAN AMERICAN): >60 ML/MIN/1.73 M^2
GLUCOSE SERPL-MCNC: 83 MG/DL
HCT VFR BLD AUTO: 47.1 %
HGB BLD-MCNC: 15.9 G/DL
IMM GRANULOCYTES # BLD AUTO: 0.01 K/UL
IMM GRANULOCYTES NFR BLD AUTO: 0.2 %
LYMPHOCYTES # BLD AUTO: 3.3 K/UL
LYMPHOCYTES NFR BLD: 54.9 %
MCH RBC QN AUTO: 28.5 PG
MCHC RBC AUTO-ENTMCNC: 33.8 G/DL
MCV RBC AUTO: 85 FL
MONOCYTES # BLD AUTO: 0.6 K/UL
MONOCYTES NFR BLD: 10.4 %
NEUTROPHILS # BLD AUTO: 1.9 K/UL
NEUTROPHILS NFR BLD: 30.6 %
NRBC BLD-RTO: 0 /100 WBC
PLATELET # BLD AUTO: 222 K/UL
PMV BLD AUTO: 11.3 FL
POTASSIUM SERPL-SCNC: 4.1 MMOL/L
PROT SERPL-MCNC: 7.2 G/DL
RBC # BLD AUTO: 5.57 M/UL
SODIUM SERPL-SCNC: 140 MMOL/L
WBC # BLD AUTO: 6.07 K/UL

## 2017-12-22 PROCEDURE — 36415 COLL VENOUS BLD VENIPUNCTURE: CPT

## 2017-12-22 PROCEDURE — 80053 COMPREHEN METABOLIC PANEL: CPT

## 2017-12-22 PROCEDURE — 87522 HEPATITIS C REVRS TRNSCRPJ: CPT

## 2017-12-22 PROCEDURE — 85025 COMPLETE CBC W/AUTO DIFF WBC: CPT

## 2017-12-22 NOTE — TELEPHONE ENCOUNTER
----- Message from Gerson Villa sent at 12/22/2017  9:39 AM CST -----  Contact: Pt  Please give pt a call at ..[phone] (home)  Regarding his medication he will be out of today.

## 2017-12-26 ENCOUNTER — TELEPHONE (OUTPATIENT)
Dept: GASTROENTEROLOGY | Facility: CLINIC | Age: 67
End: 2017-12-26

## 2017-12-26 NOTE — TELEPHONE ENCOUNTER
----- Message from Gerson Villa sent at 12/26/2017  9:00 AM CST -----  Contact: Pt  Please give pt a call at ..[phone] (home) regarding some medication.

## 2017-12-26 NOTE — TELEPHONE ENCOUNTER
Called and spoke with patient and patient just was calling to let us know that his medication would be coming [facility] today [facility]

## 2017-12-27 ENCOUNTER — OFFICE VISIT (OUTPATIENT)
Dept: FAMILY MEDICINE | Facility: CLINIC | Age: 67
End: 2017-12-27
Payer: MEDICARE

## 2017-12-27 ENCOUNTER — TELEPHONE (OUTPATIENT)
Dept: FAMILY MEDICINE | Facility: CLINIC | Age: 67
End: 2017-12-27

## 2017-12-27 VITALS
OXYGEN SATURATION: 99 % | TEMPERATURE: 97 F | HEART RATE: 72 BPM | HEIGHT: 73 IN | SYSTOLIC BLOOD PRESSURE: 122 MMHG | WEIGHT: 196.88 LBS | BODY MASS INDEX: 26.09 KG/M2 | RESPIRATION RATE: 18 BRPM | DIASTOLIC BLOOD PRESSURE: 74 MMHG

## 2017-12-27 DIAGNOSIS — E78.00 HYPERCHOLESTEREMIA: Primary | ICD-10-CM

## 2017-12-27 DIAGNOSIS — B18.2 CHRONIC HEPATITIS C WITHOUT HEPATIC COMA: ICD-10-CM

## 2017-12-27 DIAGNOSIS — D47.2 MGUS (MONOCLONAL GAMMOPATHY OF UNKNOWN SIGNIFICANCE): ICD-10-CM

## 2017-12-27 LAB
HCV LOG: 2.03 LOG (10) IU/ML
HCV RNA QUANT PCR: 106 IU/ML
HCV, QUALITATIVE: DETECTED IU/ML

## 2017-12-27 PROCEDURE — 99214 OFFICE O/P EST MOD 30 MIN: CPT | Mod: S$PBB,,, | Performed by: INTERNAL MEDICINE

## 2017-12-27 PROCEDURE — 99999 PR PBB SHADOW E&M-EST. PATIENT-LVL V: CPT | Mod: PBBFAC,,, | Performed by: INTERNAL MEDICINE

## 2017-12-27 PROCEDURE — 99215 OFFICE O/P EST HI 40 MIN: CPT | Mod: PBBFAC,PO | Performed by: INTERNAL MEDICINE

## 2017-12-27 RX ORDER — LEDIPASVIR AND SOFOSBUVIR 90; 400 MG/1; MG/1
1 TABLET, FILM COATED ORAL DAILY
Qty: 30 TABLET | Refills: 2 | Status: CANCELLED | OUTPATIENT
Start: 2017-12-27

## 2017-12-27 NOTE — TELEPHONE ENCOUNTER
----- Message from Valerie Cline sent at 12/27/2017  3:05 PM CST -----  Contact: patient  Calling concerning coming in @ 330 p running late. Please call patient @ [phone]. Thanks, kathryn

## 2017-12-27 NOTE — PROGRESS NOTES
Subjective:       Patient ID: Darnell Ruelas Jr. is a 67 y.o. male.    Chief Complaint: Follow-up; Hyperlipidemia; and Hepatitis C    Hyperlipidemia   Pertinent negatives include no chest pain, myalgias or shortness of breath. Current antihyperlipidemic treatment includes statins. The current treatment provides significant improvement of lipids.     Review of Systems   Constitutional: Negative for activity change, appetite change, chills, diaphoresis, fatigue, fever and unexpected weight change.   HENT: Negative for drooling, ear discharge, ear pain, facial swelling, hearing loss, mouth sores, nosebleeds, postnasal drip, rhinorrhea, sinus pressure, sneezing, sore throat, tinnitus, trouble swallowing and voice change.    Eyes: Negative for photophobia, redness and visual disturbance.   Respiratory: Negative for apnea, cough, choking, chest tightness, shortness of breath and wheezing.    Cardiovascular: Negative for chest pain, palpitations and leg swelling.   Gastrointestinal: Negative for abdominal distention, abdominal pain, anal bleeding, blood in stool, constipation, diarrhea, nausea and vomiting.   Endocrine: Negative for cold intolerance, heat intolerance, polydipsia, polyphagia and polyuria.   Genitourinary: Negative for difficulty urinating, dysuria, enuresis, flank pain, frequency, genital sores, hematuria and urgency.   Musculoskeletal: Negative for arthralgias, back pain, gait problem, joint swelling, myalgias, neck pain and neck stiffness.   Skin: Negative for color change, pallor, rash and wound.   Allergic/Immunologic: Negative for food allergies and immunocompromised state.   Neurological: Negative for dizziness, tremors, seizures, syncope, facial asymmetry, speech difficulty, weakness, light-headedness, numbness and headaches.   Hematological: Negative for adenopathy. Does not bruise/bleed easily.   Psychiatric/Behavioral: Negative for agitation, behavioral problems, confusion, decreased concentration,  dysphoric mood, hallucinations, self-injury, sleep disturbance and suicidal ideas. The patient is not nervous/anxious and is not hyperactive.        Objective:      Physical Exam   Constitutional: He is oriented to person, place, and time. He appears well-developed and well-nourished. No distress.   HENT:   Head: Normocephalic and atraumatic.   Eyes: Pupils are equal, round, and reactive to light. No scleral icterus.   Neck: Normal range of motion. Neck supple. No JVD present. Carotid bruit is not present. No tracheal deviation present. No thyromegaly present.   Cardiovascular: Normal rate, regular rhythm, normal heart sounds and intact distal pulses.    Pulmonary/Chest: Effort normal and breath sounds normal. No respiratory distress. He has no wheezes. He has no rales. He exhibits no tenderness.   Abdominal: Soft. Bowel sounds are normal. He exhibits no distension. There is no tenderness. There is no rebound and no guarding.   Musculoskeletal: Normal range of motion. He exhibits no edema or tenderness.   Lymphadenopathy:     He has no cervical adenopathy.   Neurological: He is alert and oriented to person, place, and time. Coordination normal.   Skin: Skin is warm and dry. No rash noted. He is not diaphoretic. No erythema. No pallor.   Psychiatric: He has a normal mood and affect. His behavior is normal. Judgment and thought content normal.   Nursing note and vitals reviewed.      Assessment:       1. Hypercholesteremia    2. Chronic hepatitis C without hepatic coma    3. MGUS (monoclonal gammopathy of unknown significance)        Plan:        stable----------watch diet,exercise.           Complete treatment for hep c.                  Notes/labs reviewed.                           F/u 6 months.

## 2017-12-28 ENCOUNTER — TELEPHONE (OUTPATIENT)
Dept: GASTROENTEROLOGY | Facility: CLINIC | Age: 67
End: 2017-12-28

## 2018-01-18 ENCOUNTER — TELEPHONE (OUTPATIENT)
Dept: GASTROENTEROLOGY | Facility: CLINIC | Age: 68
End: 2018-01-18

## 2018-01-18 NOTE — TELEPHONE ENCOUNTER
----- Message from Adri Tavarez sent at 1/18/2018  2:58 PM CST -----  1. What is the name of the medication you are requesting? Allison   2. What is the dose? unknown  3. How do you take the medication? Orally, topically, etc? orally  4. How often do you take this medication? Once a day  5. Do you need a 30 day or 90 day supply? Left up to provider  6. How many refills are you requesting? Left up to provider   7. What is your preferred pharmacy and location of the pharmacy? [facility]  8. Who can we contact with further questions? Patient [id number].                                                    wade

## 2018-02-06 ENCOUNTER — OFFICE VISIT (OUTPATIENT)
Dept: FAMILY MEDICINE | Facility: CLINIC | Age: 68
End: 2018-02-06
Payer: MEDICARE

## 2018-02-06 VITALS
RESPIRATION RATE: 18 BRPM | HEIGHT: 73 IN | OXYGEN SATURATION: 97 % | SYSTOLIC BLOOD PRESSURE: 110 MMHG | DIASTOLIC BLOOD PRESSURE: 80 MMHG | BODY MASS INDEX: 25.86 KG/M2 | WEIGHT: 195.13 LBS | HEART RATE: 81 BPM | TEMPERATURE: 98 F

## 2018-02-06 DIAGNOSIS — H91.90 HEARING LOSS, UNSPECIFIED HEARING LOSS TYPE, UNSPECIFIED LATERALITY: ICD-10-CM

## 2018-02-06 DIAGNOSIS — H61.23 CERUMINOSIS, BILATERAL: Primary | ICD-10-CM

## 2018-02-06 PROCEDURE — 99214 OFFICE O/P EST MOD 30 MIN: CPT | Mod: PBBFAC,PO | Performed by: INTERNAL MEDICINE

## 2018-02-06 PROCEDURE — 99213 OFFICE O/P EST LOW 20 MIN: CPT | Mod: S$PBB,,, | Performed by: INTERNAL MEDICINE

## 2018-02-06 PROCEDURE — 1159F MED LIST DOCD IN RCRD: CPT | Mod: ,,, | Performed by: INTERNAL MEDICINE

## 2018-02-06 PROCEDURE — 1126F AMNT PAIN NOTED NONE PRSNT: CPT | Mod: ,,, | Performed by: INTERNAL MEDICINE

## 2018-02-06 PROCEDURE — 99999 PR PBB SHADOW E&M-EST. PATIENT-LVL IV: CPT | Mod: PBBFAC,,, | Performed by: INTERNAL MEDICINE

## 2018-02-06 NOTE — PROGRESS NOTES
Subjective:       Patient ID: Darnell Ruelas Jr. is a 68 y.o. male.    Chief Complaint: Right ear problem (Popping)  -decreased hearing/popping right ear-------------  HPI  Review of Systems   Constitutional: Negative.  Negative for chills and fever.   HENT: Positive for hearing loss. Negative for congestion, postnasal drip, rhinorrhea, sinus pain and sinus pressure.    Respiratory: Negative for apnea, cough, choking, chest tightness, shortness of breath, wheezing and stridor.    Cardiovascular: Negative for chest pain, palpitations and leg swelling.   Gastrointestinal: Negative for abdominal pain, nausea and vomiting.   Neurological: Negative for dizziness, tremors, speech difficulty, numbness and headaches.   Psychiatric/Behavioral: Negative for agitation, behavioral problems and confusion.       Objective:      Physical Exam   Constitutional: He appears well-developed and well-nourished.   HENT:   bilat ceruminosis--------    Cardiovascular: Normal rate, regular rhythm, normal heart sounds and intact distal pulses.    Pulmonary/Chest: Effort normal and breath sounds normal.   Nursing note and vitals reviewed.      Assessment:       1. Ceruminosis, bilateral    2. Hearing loss, unspecified hearing loss type, unspecified laterality        Plan:         -ent consult----------

## 2018-02-12 ENCOUNTER — TELEPHONE (OUTPATIENT)
Dept: GASTROENTEROLOGY | Facility: CLINIC | Age: 68
End: 2018-02-12

## 2018-02-12 NOTE — TELEPHONE ENCOUNTER
Returned call to pt who stated he will complete Harvoni tx on 2/21/18. Please advise if pt will need appt or just lab work.

## 2018-02-15 NOTE — TELEPHONE ENCOUNTER
Have him do lab work for now; decide on follow-up afterwards. I assume he was on a 3 month treatment regimen???

## 2018-02-20 ENCOUNTER — CLINICAL SUPPORT (OUTPATIENT)
Dept: AUDIOLOGY | Facility: CLINIC | Age: 68
End: 2018-02-20
Payer: MEDICARE

## 2018-02-20 ENCOUNTER — OFFICE VISIT (OUTPATIENT)
Dept: OTOLARYNGOLOGY | Facility: CLINIC | Age: 68
End: 2018-02-20
Payer: MEDICARE

## 2018-02-20 VITALS — HEIGHT: 73 IN | BODY MASS INDEX: 25.45 KG/M2 | WEIGHT: 192 LBS

## 2018-02-20 DIAGNOSIS — H61.23 BILATERAL IMPACTED CERUMEN: Primary | ICD-10-CM

## 2018-02-20 DIAGNOSIS — H90.3 HEARING LOSS SENSORY, BILATERAL: Primary | ICD-10-CM

## 2018-02-20 DIAGNOSIS — H91.90 PERCEIVED HEARING LOSS: ICD-10-CM

## 2018-02-20 PROCEDURE — 1159F MED LIST DOCD IN RCRD: CPT | Mod: ,,, | Performed by: PHYSICIAN ASSISTANT

## 2018-02-20 PROCEDURE — 92557 COMPREHENSIVE HEARING TEST: CPT | Mod: PBBFAC,PO | Performed by: AUDIOLOGIST

## 2018-02-20 PROCEDURE — 92567 TYMPANOMETRY: CPT | Mod: PBBFAC,PO | Performed by: AUDIOLOGIST

## 2018-02-20 PROCEDURE — 69210 REMOVE IMPACTED EAR WAX UNI: CPT | Mod: S$PBB,,, | Performed by: PHYSICIAN ASSISTANT

## 2018-02-20 PROCEDURE — 99999 PR PBB SHADOW E&M-EST. PATIENT-LVL III: CPT | Mod: PBBFAC,,, | Performed by: PHYSICIAN ASSISTANT

## 2018-02-20 PROCEDURE — 99213 OFFICE O/P EST LOW 20 MIN: CPT | Mod: 25,S$PBB,, | Performed by: PHYSICIAN ASSISTANT

## 2018-02-20 PROCEDURE — 1126F AMNT PAIN NOTED NONE PRSNT: CPT | Mod: ,,, | Performed by: PHYSICIAN ASSISTANT

## 2018-02-20 PROCEDURE — 99213 OFFICE O/P EST LOW 20 MIN: CPT | Mod: PBBFAC,PO,25 | Performed by: PHYSICIAN ASSISTANT

## 2018-02-20 NOTE — PROGRESS NOTES
Subjective:       Patient ID: Darnell Ruelas Jr. is a 68 y.o. male.    Chief Complaint: Hearing Loss (cerumen impaction )    Patient is here to see me today for evaluation of a possible wax impaction in bilateral ears.  He has complaints of hearing loss in the affected ears, but denies pain or drainage.  This has not been an issue in the past.  The patient has been using any sort of ear drop to soften the wax. He states that he has noticed worsening symptoms for about 6 moths. He does have a h/o noise exposure in the  and has had hearing tests in the past.      Review of Systems   Constitutional: Negative for fatigue, fever and unexpected weight change.   HENT: Positive for hearing loss. Negative for congestion, ear discharge, ear pain, facial swelling, nosebleeds, postnasal drip, rhinorrhea, sinus pressure, sneezing, sore throat, tinnitus, trouble swallowing and voice change.    Eyes: Negative for discharge, redness and itching.   Respiratory: Negative for cough, choking, shortness of breath and wheezing.    Cardiovascular: Negative for chest pain and palpitations.   Gastrointestinal: Negative for abdominal pain.        No reflux.   Musculoskeletal: Negative for neck pain.   Neurological: Negative for dizziness, facial asymmetry, light-headedness and headaches.   Hematological: Negative for adenopathy. Does not bruise/bleed easily.   Psychiatric/Behavioral: Negative for agitation, behavioral problems, confusion and decreased concentration.       Objective:      Physical Exam   Constitutional: He is oriented to person, place, and time. Vital signs are normal. He appears well-developed and well-nourished. No distress.   HENT:   Head: Normocephalic and atraumatic.   Right Ear: Hearing, tympanic membrane, external ear and ear canal normal.   Left Ear: Hearing, tympanic membrane, external ear and ear canal normal.   Nose: Nose normal. No mucosal edema, rhinorrhea, nasal deformity or septal deviation.    Mouth/Throat: Uvula is midline, oropharynx is clear and moist and mucous membranes are normal. No trismus in the jaw. Normal dentition. No uvula swelling. No oropharyngeal exudate or posterior oropharyngeal edema.   Bilateral cerumen impaction R>L   Eyes: Conjunctivae and EOM are normal. Pupils are equal, round, and reactive to light. Right eye exhibits no chemosis. Left eye exhibits no chemosis. Right conjunctiva is not injected. Left conjunctiva is not injected. No scleral icterus.   Neck: Trachea normal and phonation normal. No tracheal tenderness present. No tracheal deviation present. No thyroid mass and no thyromegaly present.   Cardiovascular: Intact distal pulses.    Pulmonary/Chest: Effort normal. No accessory muscle usage or stridor. No respiratory distress.   Lymphadenopathy:        Head (right side): No submental, no submandibular, no preauricular and no posterior auricular adenopathy present.        Head (left side): No submental, no submandibular, no preauricular and no posterior auricular adenopathy present.     He has no cervical adenopathy.        Right cervical: No superficial cervical and no deep cervical adenopathy present.       Left cervical: No superficial cervical and no deep cervical adenopathy present.   Neurological: He is alert and oriented to person, place, and time. No cranial nerve deficit.   Skin: Skin is warm and dry. No rash noted. No erythema.   Psychiatric: He has a normal mood and affect. His behavior is normal. Thought content normal.       Procedure Note    CHIEF COMPLAINT:  Cerumen Impaction    Description:  The patient was seated in an exam chair.  An ear speculum was placed in the right EAC and was examined under the microscope.  Suction and/or loop curettes were used to remove a large cerumen impaction.  The tympanic membrane was visualized and was normal in appearance.  The procedure was repeated on the left side in a similar fashion.  The TM was intact and normal on  this side as well.  The patient tolerated the procedure well.  Assessment:       1. Bilateral impacted cerumen    2. Perceived hearing loss        Plan:       1  Cerumen impaction:  Removed today without difficulty.  I would recommend the use of a wax softening drop, either over the counter Debrox or mineral oil, on a weekly basis.  I also instructed the patient to avoid Qtips.  2. Perceived Hearing Loss: has audiogram with audiology today. SEE NOTE FOR RESULTS

## 2018-02-20 NOTE — PROGRESS NOTES
Darnell Ruelas JrMartha was seen 02/20/2018 for an audiological evaluation as part of his visit for cerumenosis with ENT.  Pt reports history of noise exposure including  service.  He denies tinnitus and dizziness.    Results reveal a mild-to-moderate sensorineural hearing loss 6212-0493 Hz for the right ear, and  moderate sensorineural hearing loss 1953-1489 Hz for the left ear.   Speech Reception Thresholds were  10 dBHL for the right ear and 10 dBHL for the left ear.   Word recognition scores were excellent for the right ear and excellent for the left ear.   Tympanograms were Type A for the right ear and Type A for the left ear.    Patient was counseled on the above findings.    REC:  ENT review of audiogram  Annual audiogram  HAC if pt becomes motivated (not interested today)

## 2018-02-20 NOTE — LETTER
February 20, 2018      Beto Godfrey MD  [address] [place] [address]           [facility]  [address] [facility] [address] [place] [address]  Phone: [phone]  Fax: [phone]          Patient: Darnell Ruelas Jr.   MR Number: [id number]   YOB: 1950   Date of Visit: 2/20/2018       Dear Dr. Beto Godfrey:    Thank you for referring Darnell Ruelas to me for evaluation. Attached you will find relevant portions of my assessment and plan of care.    If you have questions, please do not hesitate to call me. I look forward to following Darnell Ruelas along with you.    Sincerely,    VY Barnettosure  CC:  No Recipients    If you would like to receive this communication electronically, please contact [email or web address] or [phone] to request more information on [facility] access.    For providers and/or their staff who would like to refer a patient to [facility], please contact us through our one-stop-shop provider referral line, [facility], at [phone].    If you feel you have received this communication in error or would no longer like to receive these types of communications, please e-mail [email or web address]

## 2018-03-06 ENCOUNTER — OFFICE VISIT (OUTPATIENT)
Dept: GASTROENTEROLOGY | Facility: CLINIC | Age: 68
End: 2018-03-06
Payer: MEDICARE

## 2018-03-06 ENCOUNTER — LAB VISIT (OUTPATIENT)
Dept: LAB | Facility: HOSPITAL | Age: 68
End: 2018-03-06
Attending: INTERNAL MEDICINE
Payer: MEDICARE

## 2018-03-06 VITALS
WEIGHT: 194 LBS | SYSTOLIC BLOOD PRESSURE: 110 MMHG | HEART RATE: 78 BPM | DIASTOLIC BLOOD PRESSURE: 80 MMHG | BODY MASS INDEX: 25.71 KG/M2 | HEIGHT: 73 IN

## 2018-03-06 DIAGNOSIS — B18.2 CHRONIC HEPATITIS C WITHOUT HEPATIC COMA: Primary | ICD-10-CM

## 2018-03-06 DIAGNOSIS — B18.2 CHRONIC HEPATITIS C WITHOUT HEPATIC COMA: ICD-10-CM

## 2018-03-06 LAB
ALBUMIN SERPL BCP-MCNC: 4 G/DL
ALP SERPL-CCNC: 75 U/L
ALT SERPL W/O P-5'-P-CCNC: 20 U/L
ANION GAP SERPL CALC-SCNC: 8 MMOL/L
AST SERPL-CCNC: 25 U/L
BILIRUB DIRECT SERPL-MCNC: 0.3 MG/DL
BILIRUB SERPL-MCNC: 0.6 MG/DL
BUN SERPL-MCNC: 11 MG/DL
CALCIUM SERPL-MCNC: 9.4 MG/DL
CHLORIDE SERPL-SCNC: 104 MMOL/L
CO2 SERPL-SCNC: 29 MMOL/L
CREAT SERPL-MCNC: 1.2 MG/DL
EST. GFR  (AFRICAN AMERICAN): >60 ML/MIN/1.73 M^2
EST. GFR  (NON AFRICAN AMERICAN): >60 ML/MIN/1.73 M^2
GLUCOSE SERPL-MCNC: 92 MG/DL
POTASSIUM SERPL-SCNC: 4.2 MMOL/L
PROT SERPL-MCNC: 7.7 G/DL
SODIUM SERPL-SCNC: 141 MMOL/L

## 2018-03-06 PROCEDURE — 80076 HEPATIC FUNCTION PANEL: CPT

## 2018-03-06 PROCEDURE — 87522 HEPATITIS C REVRS TRNSCRPJ: CPT

## 2018-03-06 PROCEDURE — 36415 COLL VENOUS BLD VENIPUNCTURE: CPT

## 2018-03-06 PROCEDURE — 99499 UNLISTED E&M SERVICE: CPT | Mod: S$PBB,,, | Performed by: INTERNAL MEDICINE

## 2018-03-06 PROCEDURE — 99999 PR PBB SHADOW E&M-EST. PATIENT-LVL III: CPT | Mod: PBBFAC,,, | Performed by: INTERNAL MEDICINE

## 2018-03-06 PROCEDURE — 99213 OFFICE O/P EST LOW 20 MIN: CPT | Mod: PBBFAC | Performed by: INTERNAL MEDICINE

## 2018-03-06 PROCEDURE — 80048 BASIC METABOLIC PNL TOTAL CA: CPT

## 2018-03-06 NOTE — PROGRESS NOTES
Subjective:       Patient ID: Darnell Ruelas Jr. is a 68 y.o. male.    Chief Complaint: Hepatitis C    Patient with hepatitis C.  Appointment was scheduled without the patient having arrangements to get his lab work done.  Since there is nothing to assess there is no reason for visit.  We'll have him reschedule if necessary.  He has completed this therapy and if studies are negative return visit in the not be needed.      Review of Systems    Objective:      Physical Exam    Assessment:   Hepatitis C  No diagnosis found.    Plan:   Cancel this visit

## 2018-03-09 LAB
HCV LOG: <1.08 LOG (10) IU/ML
HCV RNA QUANT PCR: <12 IU/ML
HCV, QUALITATIVE: NOT DETECTED IU/ML

## 2018-06-28 ENCOUNTER — OFFICE VISIT (OUTPATIENT)
Dept: FAMILY MEDICINE | Facility: CLINIC | Age: 68
End: 2018-06-28
Payer: MEDICARE

## 2018-06-28 VITALS
BODY MASS INDEX: 26.06 KG/M2 | HEIGHT: 73 IN | WEIGHT: 196.63 LBS | OXYGEN SATURATION: 97 % | HEART RATE: 75 BPM | DIASTOLIC BLOOD PRESSURE: 80 MMHG | RESPIRATION RATE: 16 BRPM | TEMPERATURE: 96 F | SYSTOLIC BLOOD PRESSURE: 138 MMHG

## 2018-06-28 DIAGNOSIS — E78.00 HYPERCHOLESTEREMIA: Primary | ICD-10-CM

## 2018-06-28 PROCEDURE — 99214 OFFICE O/P EST MOD 30 MIN: CPT | Mod: PBBFAC,PO | Performed by: INTERNAL MEDICINE

## 2018-06-28 PROCEDURE — 99999 PR PBB SHADOW E&M-EST. PATIENT-LVL IV: CPT | Mod: PBBFAC,,, | Performed by: INTERNAL MEDICINE

## 2018-06-28 PROCEDURE — 99213 OFFICE O/P EST LOW 20 MIN: CPT | Mod: S$PBB,,, | Performed by: INTERNAL MEDICINE

## 2018-06-28 RX ORDER — NYSTATIN 100000 [USP'U]/G
POWDER TOPICAL 2 TIMES DAILY
Qty: 60 G | Refills: 0 | Status: SHIPPED | OUTPATIENT
Start: 2018-06-28 | End: 2021-04-26

## 2018-06-28 NOTE — PROGRESS NOTES
Subjective:       Patient ID: Darnell Ruelas Jr. is a 68 y.o. male.    Chief Complaint: Hyperlipidemia    Hyperlipidemia   This is a chronic problem. The problem is controlled. Associated symptoms include myalgias. Pertinent negatives include no chest pain or shortness of breath. Current antihyperlipidemic treatment includes statins. The current treatment provides significant improvement of lipids.     Past Medical History:   Diagnosis Date    Chronic hepatitis C     Hypercholesteremia     Kidney stones     Prostate cancer      Past Surgical History:   Procedure Laterality Date    CHOLECYSTECTOMY      TONSILLECTOMY       No family history on file.  Social History     Social History    Marital status:      Spouse name: N/A    Number of children: N/A    Years of education: N/A     Occupational History    Not on file.     Social History Main Topics    Smoking status: Never Smoker    Smokeless tobacco: Former User    Alcohol use 0.0 oz/week      Comment: rarely    Drug use: No    Sexual activity: Yes     Other Topics Concern    Not on file     Social History Narrative    No narrative on file     Review of Systems   Constitutional: Negative for activity change, appetite change, chills, diaphoresis, fatigue, fever and unexpected weight change.   HENT: Negative for drooling, ear discharge, ear pain, facial swelling, hearing loss, mouth sores, nosebleeds, postnasal drip, rhinorrhea, sinus pressure, sneezing, sore throat, tinnitus, trouble swallowing and voice change.    Eyes: Negative for photophobia, redness and visual disturbance.   Respiratory: Negative for apnea, cough, choking, chest tightness, shortness of breath and wheezing.    Cardiovascular: Negative for chest pain, palpitations and leg swelling.   Gastrointestinal: Negative for abdominal distention, abdominal pain, anal bleeding, blood in stool, constipation, diarrhea, nausea and vomiting.   Endocrine: Negative for cold intolerance, heat  intolerance, polydipsia, polyphagia and polyuria.   Genitourinary: Negative for difficulty urinating, dysuria, enuresis, flank pain, frequency, genital sores, hematuria and urgency.   Musculoskeletal: Positive for arthralgias and myalgias. Negative for back pain, gait problem, joint swelling, neck pain and neck stiffness.   Skin: Negative for color change, pallor, rash and wound.   Allergic/Immunologic: Negative for food allergies and immunocompromised state.   Neurological: Negative for dizziness, tremors, seizures, syncope, facial asymmetry, speech difficulty, weakness, light-headedness, numbness and headaches.   Hematological: Negative for adenopathy. Does not bruise/bleed easily.   Psychiatric/Behavioral: Negative for agitation, behavioral problems, confusion, decreased concentration, dysphoric mood, hallucinations, self-injury, sleep disturbance and suicidal ideas. The patient is not nervous/anxious and is not hyperactive.        Objective:      Physical Exam   Constitutional: He is oriented to person, place, and time. He appears well-developed and well-nourished. No distress.   HENT:   Head: Normocephalic and atraumatic.   Eyes: Pupils are equal, round, and reactive to light. No scleral icterus.   Neck: Normal range of motion. Neck supple. No JVD present. Carotid bruit is not present. No tracheal deviation present. No thyromegaly present.   Cardiovascular: Normal rate, regular rhythm, normal heart sounds and intact distal pulses.    Pulmonary/Chest: Effort normal and breath sounds normal. No respiratory distress. He has no wheezes. He has no rales. He exhibits no tenderness.   Abdominal: Soft. Bowel sounds are normal. He exhibits no distension. There is no tenderness. There is no rebound and no guarding.   Musculoskeletal: Normal range of motion. He exhibits no edema or tenderness.   Lymphadenopathy:     He has no cervical adenopathy.   Neurological: He is alert and oriented to person, place, and time.   Skin:  Skin is warm and dry. No rash noted. He is not diaphoretic. No erythema. No pallor.   Psychiatric: He has a normal mood and affect. His behavior is normal. Judgment and thought content normal.   Nursing note and vitals reviewed.      CMP  Sodium   Date Value Ref Range Status   03/06/2018 141 136 - 145 mmol/L Final     Potassium   Date Value Ref Range Status   03/06/2018 4.2 3.5 - 5.1 mmol/L Final     Chloride   Date Value Ref Range Status   03/06/2018 104 95 - 110 mmol/L Final     CO2   Date Value Ref Range Status   03/06/2018 29 23 - 29 mmol/L Final     Glucose   Date Value Ref Range Status   03/06/2018 92 70 - 110 mg/dL Final     BUN, Bld   Date Value Ref Range Status   03/06/2018 11 8 - 23 mg/dL Final     Creatinine   Date Value Ref Range Status   03/06/2018 1.2 0.5 - 1.4 mg/dL Final     Calcium   Date Value Ref Range Status   03/06/2018 9.4 8.7 - 10.5 mg/dL Final     Total Protein   Date Value Ref Range Status   03/06/2018 7.7 6.0 - 8.4 g/dL Final     Albumin   Date Value Ref Range Status   03/06/2018 4.0 3.5 - 5.2 g/dL Final     Total Bilirubin   Date Value Ref Range Status   03/06/2018 0.6 0.1 - 1.0 mg/dL Final     Comment:     For infants and newborns, interpretation of results should be based  on gestational age, weight and in agreement with clinical  observations.  Premature Infant recommended reference ranges:  Up to 24 hours.............<8.0 mg/dL  Up to 48 hours............<12.0 mg/dL  3-5 days..................<15.0 mg/dL  6-29 days.................<15.0 mg/dL       Alkaline Phosphatase   Date Value Ref Range Status   03/06/2018 75 55 - 135 U/L Final     AST   Date Value Ref Range Status   03/06/2018 25 10 - 40 U/L Final     ALT   Date Value Ref Range Status   03/06/2018 20 10 - 44 U/L Final     Anion Gap   Date Value Ref Range Status   03/06/2018 8 8 - 16 mmol/L Final     eGFR if    Date Value Ref Range Status   03/06/2018 >60.0 >60 mL/min/1.73 m^2 Final     eGFR if non African American    Date Value Ref Range Status   03/06/2018 >60.0 >60 mL/min/1.73 m^2 Final     Comment:     Calculation used to obtain the estimated glomerular filtration  rate (eGFR) is the CKD-EPI equation.        Lab Results   Component Value Date    WBC 6.07 12/22/2017    HGB 15.9 12/22/2017    HCT 47.1 12/22/2017    MCV 85 12/22/2017     12/22/2017     Lab Results   Component Value Date    CHOL 156 08/23/2017     Lab Results   Component Value Date    HDL 46 08/23/2017     Lab Results   Component Value Date    LDLCALC 85.2 08/23/2017     Lab Results   Component Value Date    TRIG 124 08/23/2017     Lab Results   Component Value Date    CHOLHDL 29.5 08/23/2017     Lab Results   Component Value Date    TSH 0.845 08/21/2017     No results found for: LABA1C, HGBA1C  Assessment:       1. Hypercholesteremia        Plan:   Hypercholesteremia---------------------controlled -------- Statin.    Other orders  -     nystatin (MYCOSTATIN) powder; Apply topically 2 (two) times daily.  Dispense: 60 g; Refill: 0                 Sees urology dr flowers.                     F/u 6 month.s

## 2018-07-19 RX ORDER — CETIRIZINE HYDROCHLORIDE 10 MG/1
TABLET ORAL
Qty: 90 TABLET | Refills: 2 | Status: SHIPPED | OUTPATIENT
Start: 2018-07-19 | End: 2019-04-15 | Stop reason: SDUPTHER

## 2018-08-10 ENCOUNTER — LAB VISIT (OUTPATIENT)
Dept: LAB | Facility: HOSPITAL | Age: 68
End: 2018-08-10
Attending: INTERNAL MEDICINE
Payer: MEDICARE

## 2018-08-10 DIAGNOSIS — C61 MALIGNANT NEOPLASM OF PROSTATE: ICD-10-CM

## 2018-08-10 DIAGNOSIS — D47.2 MGUS (MONOCLONAL GAMMOPATHY OF UNKNOWN SIGNIFICANCE): ICD-10-CM

## 2018-08-10 DIAGNOSIS — B18.2 CHRONIC HEPATITIS C WITHOUT HEPATIC COMA: ICD-10-CM

## 2018-08-10 LAB
AFP SERPL-MCNC: 4.2 NG/ML
ALBUMIN SERPL BCP-MCNC: 3.8 G/DL
ALP SERPL-CCNC: 70 U/L
ALT SERPL W/O P-5'-P-CCNC: 19 U/L
ANION GAP SERPL CALC-SCNC: 9 MMOL/L
AST SERPL-CCNC: 22 U/L
B2 MICROGLOB SERPL-MCNC: 2.1 UG/ML
BASOPHILS # BLD AUTO: 0.01 K/UL
BASOPHILS NFR BLD: 0.2 %
BILIRUB SERPL-MCNC: 0.5 MG/DL
BUN SERPL-MCNC: 14 MG/DL
CALCIUM SERPL-MCNC: 8.9 MG/DL
CHLORIDE SERPL-SCNC: 107 MMOL/L
CO2 SERPL-SCNC: 24 MMOL/L
CREAT SERPL-MCNC: 1.1 MG/DL
DIFFERENTIAL METHOD: ABNORMAL
EOSINOPHIL # BLD AUTO: 0.1 K/UL
EOSINOPHIL NFR BLD: 2 %
ERYTHROCYTE [DISTWIDTH] IN BLOOD BY AUTOMATED COUNT: 13.3 %
EST. GFR  (AFRICAN AMERICAN): >60 ML/MIN/1.73 M^2
EST. GFR  (NON AFRICAN AMERICAN): >60 ML/MIN/1.73 M^2
GLUCOSE SERPL-MCNC: 101 MG/DL
HCT VFR BLD AUTO: 47.9 %
HGB BLD-MCNC: 16.6 G/DL
LYMPHOCYTES # BLD AUTO: 3.3 K/UL
LYMPHOCYTES NFR BLD: 53.8 %
MCH RBC QN AUTO: 29.7 PG
MCHC RBC AUTO-ENTMCNC: 34.7 G/DL
MCV RBC AUTO: 86 FL
MONOCYTES # BLD AUTO: 0.4 K/UL
MONOCYTES NFR BLD: 7.3 %
NEUTROPHILS # BLD AUTO: 2.2 K/UL
NEUTROPHILS NFR BLD: 36.7 %
PLATELET # BLD AUTO: 201 K/UL
PMV BLD AUTO: 10 FL
POTASSIUM SERPL-SCNC: 4.3 MMOL/L
PROT SERPL-MCNC: 7.3 G/DL
RBC # BLD AUTO: 5.59 M/UL
SODIUM SERPL-SCNC: 140 MMOL/L
WBC # BLD AUTO: 6.04 K/UL

## 2018-08-10 PROCEDURE — 80053 COMPREHEN METABOLIC PANEL: CPT | Mod: PO

## 2018-08-10 PROCEDURE — 85025 COMPLETE CBC W/AUTO DIFF WBC: CPT | Mod: PO

## 2018-08-10 PROCEDURE — 82232 ASSAY OF BETA-2 PROTEIN: CPT

## 2018-08-10 PROCEDURE — 82105 ALPHA-FETOPROTEIN SERUM: CPT

## 2018-08-10 PROCEDURE — 36415 COLL VENOUS BLD VENIPUNCTURE: CPT | Mod: PO

## 2018-08-10 PROCEDURE — 83520 IMMUNOASSAY QUANT NOS NONAB: CPT | Mod: 59

## 2018-08-10 PROCEDURE — 84165 PROTEIN E-PHORESIS SERUM: CPT | Mod: 26,,, | Performed by: PATHOLOGY

## 2018-08-10 PROCEDURE — 84165 PROTEIN E-PHORESIS SERUM: CPT

## 2018-08-13 LAB
ALBUMIN SERPL ELPH-MCNC: 4.05 G/DL
ALPHA1 GLOB SERPL ELPH-MCNC: 0.28 G/DL
ALPHA2 GLOB SERPL ELPH-MCNC: 0.84 G/DL
B-GLOBULIN SERPL ELPH-MCNC: 0.83 G/DL
GAMMA GLOB SERPL ELPH-MCNC: 1.21 G/DL
KAPPA LC SER QL IA: 1.52 MG/DL
KAPPA LC/LAMBDA SER IA: 1.12
LAMBDA LC SER QL IA: 1.36 MG/DL
PATHOLOGIST INTERPRETATION SPE: NORMAL
PROT SERPL-MCNC: 7.2 G/DL

## 2018-08-24 ENCOUNTER — LAB VISIT (OUTPATIENT)
Dept: LAB | Facility: HOSPITAL | Age: 68
End: 2018-08-24
Attending: INTERNAL MEDICINE
Payer: MEDICARE

## 2018-08-24 ENCOUNTER — OFFICE VISIT (OUTPATIENT)
Dept: HEMATOLOGY/ONCOLOGY | Facility: CLINIC | Age: 68
End: 2018-08-24
Payer: MEDICARE

## 2018-08-24 VITALS
SYSTOLIC BLOOD PRESSURE: 117 MMHG | BODY MASS INDEX: 26.09 KG/M2 | OXYGEN SATURATION: 98 % | WEIGHT: 197.75 LBS | TEMPERATURE: 99 F | DIASTOLIC BLOOD PRESSURE: 76 MMHG

## 2018-08-24 DIAGNOSIS — B18.2 CHRONIC HEPATITIS C WITHOUT HEPATIC COMA: ICD-10-CM

## 2018-08-24 DIAGNOSIS — C61 PROSTATE CANCER: ICD-10-CM

## 2018-08-24 DIAGNOSIS — D47.2 MGUS (MONOCLONAL GAMMOPATHY OF UNKNOWN SIGNIFICANCE): Primary | ICD-10-CM

## 2018-08-24 DIAGNOSIS — D47.2 MGUS (MONOCLONAL GAMMOPATHY OF UNKNOWN SIGNIFICANCE): ICD-10-CM

## 2018-08-24 LAB — COMPLEXED PSA SERPL-MCNC: <0.01 NG/ML

## 2018-08-24 PROCEDURE — 88185 FLOWCYTOMETRY/TC ADD-ON: CPT | Mod: 59 | Performed by: PATHOLOGY

## 2018-08-24 PROCEDURE — 88184 FLOWCYTOMETRY/ TC 1 MARKER: CPT | Performed by: PATHOLOGY

## 2018-08-24 PROCEDURE — 36415 COLL VENOUS BLD VENIPUNCTURE: CPT

## 2018-08-24 PROCEDURE — 99999 PR PBB SHADOW E&M-EST. PATIENT-LVL III: CPT | Mod: PBBFAC,,, | Performed by: INTERNAL MEDICINE

## 2018-08-24 PROCEDURE — 88189 FLOWCYTOMETRY/READ 16 & >: CPT | Mod: ,,, | Performed by: PATHOLOGY

## 2018-08-24 PROCEDURE — 99214 OFFICE O/P EST MOD 30 MIN: CPT | Mod: S$PBB,,, | Performed by: INTERNAL MEDICINE

## 2018-08-24 PROCEDURE — 99213 OFFICE O/P EST LOW 20 MIN: CPT | Mod: PBBFAC,25 | Performed by: INTERNAL MEDICINE

## 2018-08-24 PROCEDURE — 84153 ASSAY OF PSA TOTAL: CPT

## 2018-08-24 NOTE — PROGRESS NOTES
Subjective:       Patient ID: Darnell Ruelas Jr. is a 68 y.o. male.    Chief Complaint: Results and Prostate Cancer    HPI 68-year-old male history of monoclonal gammopathy of undetermined significance returns for clinical followup patient has had history of prostate carcinoma past last PSA in September 2017 at  clinic    Past Medical History:   Diagnosis Date    Chronic hepatitis C     Hypercholesteremia     Kidney stones     Malignant neoplasm of prostate 2/14/2013    Prostate cancer      History reviewed. No pertinent family history.  Social History     Socioeconomic History    Marital status:      Spouse name: Not on file    Number of children: Not on file    Years of education: Not on file    Highest education level: Not on file   Social Needs    Financial resource strain: Not on file    Food insecurity - worry: Not on file    Food insecurity - inability: Not on file    Transportation needs - medical: Not on file    Transportation needs - non-medical: Not on file   Occupational History    Not on file   Tobacco Use    Smoking status: Never Smoker    Smokeless tobacco: Former User   Substance and Sexual Activity    Alcohol use: Yes     Alcohol/week: 0.0 oz     Comment: rarely    Drug use: No    Sexual activity: Yes   Other Topics Concern    Not on file   Social History Narrative    Not on file     Past Surgical History:   Procedure Laterality Date    CHOLECYSTECTOMY      TONSILLECTOMY         Labs:  Lab Results   Component Value Date    WBC 6.04 08/10/2018    HGB 16.6 08/10/2018    HCT 47.9 08/10/2018    MCV 86 08/10/2018     08/10/2018     BMP  Lab Results   Component Value Date     08/10/2018    K 4.3 08/10/2018     08/10/2018    CO2 24 08/10/2018    BUN 14 08/10/2018    CREATININE 1.1 08/10/2018    CALCIUM 8.9 08/10/2018    ANIONGAP 9 08/10/2018    ESTGFRAFRICA >60 08/10/2018    EGFRNONAA >60 08/10/2018     Lab Results   Component Value Date    ALT 19  08/10/2018    AST 22 08/10/2018    GGT 83 (H) 09/20/2017    ALKPHOS 70 08/10/2018    BILITOT 0.5 08/10/2018       Lab Results   Component Value Date    IRON 158 04/11/2008    TIBC 330 04/11/2008    FERRITIN 330.2 (H) 04/11/2008     No results found for: [id number]  No results found for: FOLATE  Lab Results   Component Value Date    TSH 0.845 08/21/2017         Review of Systems   Constitutional: Positive for fatigue. Negative for activity change, appetite change, chills, diaphoresis, fever and unexpected weight change.   HENT: Negative for congestion, dental problem, drooling, ear discharge, ear pain, facial swelling, hearing loss, mouth sores, nosebleeds, postnasal drip, rhinorrhea, sinus pressure, sneezing, sore throat, tinnitus, trouble swallowing and voice change.    Eyes: Negative for photophobia, pain, discharge, redness, itching and visual disturbance.   Respiratory: Negative for apnea, cough, choking, chest tightness, shortness of breath, wheezing and stridor.    Cardiovascular: Negative for chest pain, palpitations and leg swelling.   Gastrointestinal: Negative for abdominal distention, abdominal pain, anal bleeding, blood in stool, constipation, diarrhea, nausea, rectal pain and vomiting.   Endocrine: Negative for cold intolerance, heat intolerance, polydipsia, polyphagia and polyuria.   Genitourinary: Negative for decreased urine volume, difficulty urinating, discharge, dysuria, enuresis, flank pain, frequency, genital sores, hematuria, penile pain, penile swelling, scrotal swelling, testicular pain and urgency.   Musculoskeletal: Negative for arthralgias, back pain, gait problem, joint swelling, myalgias, neck pain and neck stiffness.   Skin: Negative for color change, pallor, rash and wound.   Allergic/Immunologic: Negative for environmental allergies, food allergies and immunocompromised state.   Neurological: Positive for weakness. Negative for dizziness, tremors, seizures, syncope, facial asymmetry,  speech difficulty, light-headedness, numbness and headaches.   Hematological: Negative for adenopathy. Does not bruise/bleed easily.   Psychiatric/Behavioral: Positive for dysphoric mood. Negative for agitation, behavioral problems, confusion, decreased concentration, hallucinations, self-injury, sleep disturbance and suicidal ideas. The patient is nervous/anxious. The patient is not hyperactive.        Objective:      Physical Exam   Constitutional: He is oriented to person, place, and time. He appears well-developed and well-nourished. He appears distressed.   HENT:   Head: Normocephalic.   Right Ear: External ear normal.   Left Ear: External ear normal.   Nose: Nose normal. Right sinus exhibits no maxillary sinus tenderness and no frontal sinus tenderness. Left sinus exhibits no maxillary sinus tenderness and no frontal sinus tenderness.   Mouth/Throat: Oropharynx is clear and moist. No oropharyngeal exudate.   Eyes: EOM and lids are normal. Pupils are equal, round, and reactive to light. Right eye exhibits no discharge. Left eye exhibits no discharge. Right conjunctiva is not injected. Right conjunctiva has no hemorrhage. Left conjunctiva is not injected. Left conjunctiva has no hemorrhage. No scleral icterus. Right eye exhibits normal extraocular motion. Left eye exhibits normal extraocular motion.   Neck: Normal range of motion. Neck supple. No JVD present. No tracheal deviation present. No thyromegaly present.   Cardiovascular: Normal rate and regular rhythm.   Pulmonary/Chest: Effort normal. No stridor. No respiratory distress.   Abdominal: Soft. He exhibits no mass. There is no hepatosplenomegaly, splenomegaly or hepatomegaly. There is no tenderness.   Musculoskeletal: Normal range of motion. He exhibits no edema or tenderness.   Lymphadenopathy:        Head (right side): No posterior auricular and no occipital adenopathy present.        Head (left side): No posterior auricular and no occipital adenopathy  present.     He has no cervical adenopathy.        Right cervical: No superficial cervical, no deep cervical and no posterior cervical adenopathy present.       Left cervical: No superficial cervical, no deep cervical and no posterior cervical adenopathy present.     He has no axillary adenopathy.        Right: No supraclavicular adenopathy present.        Left: No supraclavicular adenopathy present.   Neurological: He is alert and oriented to person, place, and time. He has normal strength. No cranial nerve deficit. Coordination normal.   Skin: Skin is dry. No rash noted. He is not diaphoretic. No cyanosis or erythema. Nails show no clubbing.   Psychiatric: He has a normal mood and affect. His behavior is normal. Judgment and thought content normal. Cognition and memory are normal.   Vitals reviewed.          Assessment:      1. MGUS (monoclonal gammopathy of unknown significance)    2. Prostate cancer    3. Chronic hepatitis C without hepatic coma           Plan:     Results of MGUS of stable patients will have PSA checked today there has been an inversion in the patient's differential most recently does have hepatitis-C at this point will proceed if possible with leukemia immuno phenotyping communicate results through portal otherwise see 18 months with either myself or nurse practitioner for follow-up of MGUS        Hermes Mortensen Jr, MD FACP

## 2018-08-31 LAB
FLOW CYTOMETRY ANTIBODIES ANALYZED - BLOOD: NORMAL
FLOW CYTOMETRY COMMENT - BLOOD: NORMAL
FLOW CYTOMETRY INTERPRETATION - BLOOD: NORMAL

## 2018-12-28 ENCOUNTER — OFFICE VISIT (OUTPATIENT)
Dept: FAMILY MEDICINE | Facility: CLINIC | Age: 68
End: 2018-12-28
Payer: MEDICARE

## 2018-12-28 ENCOUNTER — LAB VISIT (OUTPATIENT)
Dept: LAB | Facility: HOSPITAL | Age: 68
End: 2018-12-28
Payer: MEDICARE

## 2018-12-28 VITALS
TEMPERATURE: 98 F | WEIGHT: 198.75 LBS | SYSTOLIC BLOOD PRESSURE: 131 MMHG | BODY MASS INDEX: 26.34 KG/M2 | OXYGEN SATURATION: 98 % | HEART RATE: 69 BPM | DIASTOLIC BLOOD PRESSURE: 80 MMHG | HEIGHT: 73 IN | RESPIRATION RATE: 18 BRPM

## 2018-12-28 DIAGNOSIS — D47.2 MGUS (MONOCLONAL GAMMOPATHY OF UNKNOWN SIGNIFICANCE): ICD-10-CM

## 2018-12-28 DIAGNOSIS — E78.00 HYPERCHOLESTEREMIA: Primary | ICD-10-CM

## 2018-12-28 DIAGNOSIS — E78.00 HYPERCHOLESTEREMIA: ICD-10-CM

## 2018-12-28 DIAGNOSIS — C61 PROSTATE CANCER: ICD-10-CM

## 2018-12-28 LAB
CHOLEST SERPL-MCNC: 210 MG/DL
CHOLEST/HDLC SERPL: 4.8 {RATIO}
HDLC SERPL-MCNC: 44 MG/DL
HDLC SERPL: 21 %
LDLC SERPL CALC-MCNC: 149 MG/DL
NONHDLC SERPL-MCNC: 166 MG/DL
TRIGL SERPL-MCNC: 85 MG/DL

## 2018-12-28 PROCEDURE — 80061 LIPID PANEL: CPT

## 2018-12-28 PROCEDURE — 99214 OFFICE O/P EST MOD 30 MIN: CPT | Mod: PBBFAC,PO | Performed by: INTERNAL MEDICINE

## 2018-12-28 PROCEDURE — 99999 PR PBB SHADOW E&M-EST. PATIENT-LVL IV: CPT | Mod: PBBFAC,,, | Performed by: INTERNAL MEDICINE

## 2018-12-28 PROCEDURE — 99214 OFFICE O/P EST MOD 30 MIN: CPT | Mod: S$PBB,,, | Performed by: INTERNAL MEDICINE

## 2018-12-28 PROCEDURE — 36415 COLL VENOUS BLD VENIPUNCTURE: CPT | Mod: PO

## 2018-12-28 NOTE — PROGRESS NOTES
Subjective:       Patient ID: Darnell Ruelas Jr. is a 68 y.o. male.    Chief Complaint: Follow-up (6 month) and Hyperlipidemia    Hyperlipidemia   Pertinent negatives include no chest pain, myalgias or shortness of breath. Current antihyperlipidemic treatment includes statins. The current treatment provides significant improvement of lipids.     Past Medical History:   Diagnosis Date    Chronic hepatitis C     Hypercholesteremia     Kidney stones     Malignant neoplasm of prostate 2/14/2013    Prostate cancer      Past Surgical History:   Procedure Laterality Date    CHOLECYSTECTOMY      TONSILLECTOMY       No family history on file.  Social History     Socioeconomic History    Marital status:      Spouse name: Not on file    Number of children: Not on file    Years of education: Not on file    Highest education level: Not on file   Social Needs    Financial resource strain: Not on file    Food insecurity - worry: Not on file    Food insecurity - inability: Not on file    Transportation needs - medical: Not on file    Transportation needs - non-medical: Not on file   Occupational History    Not on file   Tobacco Use    Smoking status: Never Smoker    Smokeless tobacco: Former User   Substance and Sexual Activity    Alcohol use: Yes     Alcohol/week: 0.0 oz     Comment: rarely    Drug use: No    Sexual activity: Yes   Other Topics Concern    Not on file   Social History Narrative    Not on file     Review of Systems   Constitutional: Negative for activity change, appetite change, chills, diaphoresis, fatigue, fever and unexpected weight change.   HENT: Negative for drooling, ear discharge, ear pain, facial swelling, hearing loss, mouth sores, nosebleeds, postnasal drip, rhinorrhea, sinus pressure, sneezing, sore throat, tinnitus, trouble swallowing and voice change.    Eyes: Negative for photophobia, redness and visual disturbance.   Respiratory: Negative for apnea, cough, choking, chest  tightness, shortness of breath and wheezing.    Cardiovascular: Negative for chest pain, palpitations and leg swelling.   Gastrointestinal: Negative for abdominal distention, abdominal pain, anal bleeding, blood in stool, diarrhea, nausea and vomiting.   Endocrine: Negative for cold intolerance, heat intolerance, polydipsia, polyphagia and polyuria.   Genitourinary: Negative for difficulty urinating, dysuria, enuresis, flank pain, frequency, genital sores, hematuria and urgency.   Musculoskeletal: Negative for arthralgias, back pain, gait problem, joint swelling, myalgias, neck pain and neck stiffness.   Skin: Negative for color change, pallor, rash and wound.   Allergic/Immunologic: Negative for food allergies and immunocompromised state.   Neurological: Negative for dizziness, tremors, seizures, syncope, facial asymmetry, speech difficulty, weakness, light-headedness, numbness and headaches.   Hematological: Negative for adenopathy. Does not bruise/bleed easily.   Psychiatric/Behavioral: Negative for agitation, behavioral problems, confusion, decreased concentration, dysphoric mood, hallucinations, self-injury, sleep disturbance and suicidal ideas. The patient is not nervous/anxious and is not hyperactive.        Objective:      Physical Exam   Constitutional: He is oriented to person, place, and time. He appears well-developed and well-nourished. No distress.   HENT:   Head: Normocephalic and atraumatic.   Eyes: Pupils are equal, round, and reactive to light.   Neck: Normal range of motion. Neck supple. No JVD present. Carotid bruit is not present. No tracheal deviation present. No thyromegaly present.   Cardiovascular: Normal rate, regular rhythm, normal heart sounds and intact distal pulses.   Pulmonary/Chest: Effort normal and breath sounds normal. No respiratory distress. He has no wheezes. He has no rales. He exhibits no tenderness.   Abdominal: Soft. Bowel sounds are normal. He exhibits no distension. There  is no tenderness. There is no rebound and no guarding.   Musculoskeletal: Normal range of motion. He exhibits no edema or tenderness.   Lymphadenopathy:     He has no cervical adenopathy.   Neurological: He is alert and oriented to person, place, and time.   Skin: Skin is warm and dry. No rash noted. He is not diaphoretic. No erythema. No pallor.   Psychiatric: He has a normal mood and affect. His behavior is normal. Judgment and thought content normal.   Nursing note and vitals reviewed.      CMP  Sodium   Date Value Ref Range Status   08/10/2018 140 136 - 145 mmol/L Final     Potassium   Date Value Ref Range Status   08/10/2018 4.3 3.5 - 5.1 mmol/L Final     Chloride   Date Value Ref Range Status   08/10/2018 107 95 - 110 mmol/L Final     CO2   Date Value Ref Range Status   08/10/2018 24 23 - 29 mmol/L Final     Glucose   Date Value Ref Range Status   08/10/2018 101 70 - 110 mg/dL Final     BUN, Bld   Date Value Ref Range Status   08/10/2018 14 8 - 23 mg/dL Final     Creatinine   Date Value Ref Range Status   08/10/2018 1.1 0.5 - 1.4 mg/dL Final     Calcium   Date Value Ref Range Status   08/10/2018 8.9 8.7 - 10.5 mg/dL Final     Total Protein   Date Value Ref Range Status   08/10/2018 7.3 6.0 - 8.4 g/dL Final     Albumin   Date Value Ref Range Status   08/10/2018 3.8 3.5 - 5.2 g/dL Final     Total Bilirubin   Date Value Ref Range Status   08/10/2018 0.5 0.1 - 1.0 mg/dL Final     Comment:     For infants and newborns, interpretation of results should be based  on gestational age, weight and in agreement with clinical  observations.  Premature Infant recommended reference ranges:  Up to 24 hours.............<8.0 mg/dL  Up to 48 hours............<12.0 mg/dL  3-5 days..................<15.0 mg/dL  6-29 days.................<15.0 mg/dL       Alkaline Phosphatase   Date Value Ref Range Status   08/10/2018 70 55 - 135 U/L Final     AST   Date Value Ref Range Status   08/10/2018 22 10 - 40 U/L Final     ALT   Date Value  Ref Range Status   08/10/2018 19 10 - 44 U/L Final     Anion Gap   Date Value Ref Range Status   08/10/2018 9 8 - 16 mmol/L Final     eGFR if    Date Value Ref Range Status   08/10/2018 >60 >60 mL/min/1.73 m^2 Final     eGFR if non    Date Value Ref Range Status   08/10/2018 >60 >60 mL/min/1.73 m^2 Final     Comment:     Calculation used to obtain the estimated glomerular filtration  rate (eGFR) is the CKD-EPI equation.        Lab Results   Component Value Date    WBC 6.04 08/10/2018    HGB 16.6 08/10/2018    HCT 47.9 08/10/2018    MCV 86 08/10/2018     08/10/2018     Lab Results   Component Value Date    CHOL 156 08/23/2017     Lab Results   Component Value Date    HDL 46 08/23/2017     Lab Results   Component Value Date    LDLCALC 85.2 08/23/2017     Lab Results   Component Value Date    TRIG 124 08/23/2017     Lab Results   Component Value Date    CHOLHDL 29.5 08/23/2017     Lab Results   Component Value Date    TSH 0.845 08/21/2017     No results found for: LABA1C, HGBA1C  Assessment:       1. Hypercholesteremia    2. MGUS (monoclonal gammopathy of unknown significance)    3. Prostate cancer        Plan:   Hypercholesteremia---stable.  -     Lipid panel; Future; Expected date: 12/28/2018    MGUS (monoclonal gammopathy of unknown significance)    Prostate cancer--------sees urology dr flowers.    F/u 6 month.s

## 2018-12-29 ENCOUNTER — TELEPHONE (OUTPATIENT)
Dept: FAMILY MEDICINE | Facility: CLINIC | Age: 68
End: 2018-12-29

## 2018-12-29 NOTE — LETTER
December 31, 2018    Darnell RAND [address] [address]  [facility] [address]             [facility]  [address] [address]  Phone: [phone] Dear Mr. Ruelas:    Here is the low cholesterol diet packet we discussed.       If you have any questions or concerns, please don't hesitate to call.    Sincerely,        Pteer Benton MA

## 2018-12-31 ENCOUNTER — TELEPHONE (OUTPATIENT)
Dept: FAMILY MEDICINE | Facility: CLINIC | Age: 68
End: 2018-12-31

## 2018-12-31 NOTE — TELEPHONE ENCOUNTER
----- Message from Carlene López sent at 12/31/2018  8:47 AM CST -----  Contact: [phone]   Pt would like to consult with the nurse about clarity on an issue, returning call,please call back at [phone].  Thx-AH

## 2019-01-18 ENCOUNTER — OFFICE VISIT (OUTPATIENT)
Dept: FAMILY MEDICINE | Facility: CLINIC | Age: 69
End: 2019-01-18
Payer: MEDICARE

## 2019-01-18 VITALS
HEART RATE: 70 BPM | DIASTOLIC BLOOD PRESSURE: 67 MMHG | RESPIRATION RATE: 16 BRPM | BODY MASS INDEX: 26.27 KG/M2 | TEMPERATURE: 97 F | WEIGHT: 198.19 LBS | HEIGHT: 73 IN | SYSTOLIC BLOOD PRESSURE: 123 MMHG | OXYGEN SATURATION: 98 %

## 2019-01-18 DIAGNOSIS — Z01.818 PRE-OP EXAMINATION: Primary | ICD-10-CM

## 2019-01-18 PROCEDURE — 99999 PR PBB SHADOW E&M-EST. PATIENT-LVL III: CPT | Mod: PBBFAC,,, | Performed by: INTERNAL MEDICINE

## 2019-01-18 PROCEDURE — 99213 PR OFFICE/OUTPT VISIT, EST, LEVL III, 20-29 MIN: ICD-10-PCS | Mod: S$PBB,,, | Performed by: INTERNAL MEDICINE

## 2019-01-18 PROCEDURE — 99213 OFFICE O/P EST LOW 20 MIN: CPT | Mod: PBBFAC,PO | Performed by: INTERNAL MEDICINE

## 2019-01-18 PROCEDURE — 99999 PR PBB SHADOW E&M-EST. PATIENT-LVL III: ICD-10-PCS | Mod: PBBFAC,,, | Performed by: INTERNAL MEDICINE

## 2019-01-18 PROCEDURE — 99213 OFFICE O/P EST LOW 20 MIN: CPT | Mod: S$PBB,,, | Performed by: INTERNAL MEDICINE

## 2019-01-18 NOTE — PROGRESS NOTES
Subjective:       Patient ID: Darnell Ruelas Jr. is a 69 y.o. male.    Chief Complaint: Pre-op Exam    Pre op cataract surgery dr remy-----------------------no new symptoms today-------------      Past Medical History:   Diagnosis Date    Chronic hepatitis C     Hypercholesteremia     Kidney stones     Malignant neoplasm of prostate 2/14/2013    Prostate cancer      Past Surgical History:   Procedure Laterality Date    CHOLECYSTECTOMY      TONSILLECTOMY       No family history on file.  Social History     Socioeconomic History    Marital status:      Spouse name: Not on file    Number of children: Not on file    Years of education: Not on file    Highest education level: Not on file   Social Needs    Financial resource strain: Not on file    Food insecurity - worry: Not on file    Food insecurity - inability: Not on file    Transportation needs - medical: Not on file    Transportation needs - non-medical: Not on file   Occupational History    Not on file   Tobacco Use    Smoking status: Never Smoker    Smokeless tobacco: Former User   Substance and Sexual Activity    Alcohol use: Yes     Alcohol/week: 0.0 oz     Comment: rarely    Drug use: No    Sexual activity: Yes   Other Topics Concern    Not on file   Social History Narrative    Not on file     Review of Systems   Constitutional: Negative for activity change, appetite change, chills, diaphoresis, fatigue, fever and unexpected weight change.   HENT: Negative for drooling, ear discharge, ear pain, facial swelling, hearing loss, mouth sores, nosebleeds, postnasal drip, rhinorrhea, sinus pressure, sneezing, sore throat, tinnitus, trouble swallowing and voice change.    Eyes: Negative for photophobia, redness and visual disturbance.   Respiratory: Negative for apnea, cough, choking, chest tightness, shortness of breath and wheezing.    Cardiovascular: Negative for chest pain, palpitations and leg swelling.   Gastrointestinal: Negative for  abdominal distention, abdominal pain, anal bleeding, blood in stool, constipation, diarrhea, nausea and vomiting.   Endocrine: Negative for cold intolerance, heat intolerance, polydipsia, polyphagia and polyuria.   Genitourinary: Negative for difficulty urinating, dysuria, enuresis, flank pain, frequency, genital sores, hematuria and urgency.   Musculoskeletal: Negative for arthralgias, back pain, gait problem, joint swelling, myalgias, neck pain and neck stiffness.   Skin: Negative for color change, pallor, rash and wound.   Allergic/Immunologic: Negative for food allergies and immunocompromised state.   Neurological: Negative for dizziness, tremors, seizures, syncope, facial asymmetry, speech difficulty, weakness, light-headedness, numbness and headaches.   Hematological: Negative for adenopathy. Does not bruise/bleed easily.   Psychiatric/Behavioral: Negative for agitation, behavioral problems, confusion, decreased concentration, dysphoric mood, hallucinations, self-injury, sleep disturbance and suicidal ideas. The patient is not nervous/anxious and is not hyperactive.        Objective:      Physical Exam   Constitutional: He is oriented to person, place, and time. He appears well-developed and well-nourished. No distress.   HENT:   Head: Normocephalic and atraumatic.   Eyes: Pupils are equal, round, and reactive to light.   Neck: Normal range of motion. Neck supple. No JVD present. Carotid bruit is not present. No tracheal deviation present. No thyromegaly present.   Cardiovascular: Normal rate, regular rhythm, normal heart sounds and intact distal pulses.   Pulmonary/Chest: Effort normal and breath sounds normal. No respiratory distress. He has no wheezes. He has no rales. He exhibits no tenderness.   Abdominal: Soft. Bowel sounds are normal. He exhibits no distension. There is no tenderness. There is no rebound and no guarding.   Musculoskeletal: Normal range of motion. He exhibits no edema or tenderness.    Lymphadenopathy:     He has no cervical adenopathy.   Neurological: He is alert and oriented to person, place, and time.   Skin: Skin is warm and dry. No rash noted. He is not diaphoretic. No erythema. No pallor.   Psychiatric: He has a normal mood and affect. His behavior is normal. Judgment and thought content normal.   Nursing note and vitals reviewed.      CMP  Sodium   Date Value Ref Range Status   08/10/2018 140 136 - 145 mmol/L Final     Potassium   Date Value Ref Range Status   08/10/2018 4.3 3.5 - 5.1 mmol/L Final     Chloride   Date Value Ref Range Status   08/10/2018 107 95 - 110 mmol/L Final     CO2   Date Value Ref Range Status   08/10/2018 24 23 - 29 mmol/L Final     Glucose   Date Value Ref Range Status   08/10/2018 101 70 - 110 mg/dL Final     BUN, Bld   Date Value Ref Range Status   08/10/2018 14 8 - 23 mg/dL Final     Creatinine   Date Value Ref Range Status   08/10/2018 1.1 0.5 - 1.4 mg/dL Final     Calcium   Date Value Ref Range Status   08/10/2018 8.9 8.7 - 10.5 mg/dL Final     Total Protein   Date Value Ref Range Status   08/10/2018 7.3 6.0 - 8.4 g/dL Final     Albumin   Date Value Ref Range Status   08/10/2018 3.8 3.5 - 5.2 g/dL Final     Total Bilirubin   Date Value Ref Range Status   08/10/2018 0.5 0.1 - 1.0 mg/dL Final     Comment:     For infants and newborns, interpretation of results should be based  on gestational age, weight and in agreement with clinical  observations.  Premature Infant recommended reference ranges:  Up to 24 hours.............<8.0 mg/dL  Up to 48 hours............<12.0 mg/dL  3-5 days..................<15.0 mg/dL  6-29 days.................<15.0 mg/dL       Alkaline Phosphatase   Date Value Ref Range Status   08/10/2018 70 55 - 135 U/L Final     AST   Date Value Ref Range Status   08/10/2018 22 10 - 40 U/L Final     ALT   Date Value Ref Range Status   08/10/2018 19 10 - 44 U/L Final     Anion Gap   Date Value Ref Range Status   08/10/2018 9 8 - 16 mmol/L Final      eGFR if    Date Value Ref Range Status   08/10/2018 >60 >60 mL/min/1.73 m^2 Final     eGFR if non    Date Value Ref Range Status   08/10/2018 >60 >60 mL/min/1.73 m^2 Final     Comment:     Calculation used to obtain the estimated glomerular filtration  rate (eGFR) is the CKD-EPI equation.        Lab Results   Component Value Date    WBC 6.04 08/10/2018    HGB 16.6 08/10/2018    HCT 47.9 08/10/2018    MCV 86 08/10/2018     08/10/2018     Lab Results   Component Value Date    CHOL 210 (H) 12/28/2018     Lab Results   Component Value Date    HDL 44 12/28/2018     Lab Results   Component Value Date    LDLCALC 149.0 12/28/2018     Lab Results   Component Value Date    TRIG 85 12/28/2018     Lab Results   Component Value Date    CHOLHDL 21.0 12/28/2018     Lab Results   Component Value Date    TSH 0.845 08/21/2017     No results found for: LABA1C, HGBA1C  Assessment:       1. Pre-op examination        Plan:   Pre-op examination    stable----------------continue meds.              F/u as scheduled.

## 2019-04-15 RX ORDER — CETIRIZINE HYDROCHLORIDE 10 MG/1
TABLET ORAL
Qty: 90 TABLET | Refills: 2 | Status: SHIPPED | OUTPATIENT
Start: 2019-04-15 | End: 2019-12-30

## 2019-05-08 ENCOUNTER — TELEPHONE (OUTPATIENT)
Dept: FAMILY MEDICINE | Facility: CLINIC | Age: 69
End: 2019-05-08

## 2019-05-08 NOTE — TELEPHONE ENCOUNTER
----- Message from Gloria Mojica sent at 5/8/2019  1:46 PM CDT -----  Contact: Pt  Type:  Needs Medical Advice    Who Called: Pt  Symptoms (please be specific): n/a   How long has patient had these symptoms:  N/a   Pharmacy name and phone #:   [facility]  Would the patient rather a call back or a response via [facility]? Call back   Best Call Back Number: [phone] (home)   Additional Information: The pt is [facility] to [facility] will be sending a prior authorization to get approval for the pt's medication  Simvastatin 20 mg.

## 2019-05-09 RX ORDER — SIMVASTATIN 20 MG/1
20 TABLET, FILM COATED ORAL NIGHTLY
Qty: 90 TABLET | Refills: 3 | Status: SHIPPED | OUTPATIENT
Start: 2019-05-09 | End: 2020-08-26

## 2019-07-09 ENCOUNTER — LAB VISIT (OUTPATIENT)
Dept: LAB | Facility: HOSPITAL | Age: 69
End: 2019-07-09
Payer: MEDICARE

## 2019-07-09 ENCOUNTER — OFFICE VISIT (OUTPATIENT)
Dept: FAMILY MEDICINE | Facility: CLINIC | Age: 69
End: 2019-07-09
Payer: MEDICARE

## 2019-07-09 VITALS
HEART RATE: 80 BPM | BODY MASS INDEX: 26.09 KG/M2 | WEIGHT: 197.75 LBS | DIASTOLIC BLOOD PRESSURE: 78 MMHG | SYSTOLIC BLOOD PRESSURE: 126 MMHG | TEMPERATURE: 99 F | OXYGEN SATURATION: 97 %

## 2019-07-09 DIAGNOSIS — E78.00 HYPERCHOLESTEREMIA: ICD-10-CM

## 2019-07-09 DIAGNOSIS — C61 PROSTATE CANCER: ICD-10-CM

## 2019-07-09 DIAGNOSIS — D47.2 MGUS (MONOCLONAL GAMMOPATHY OF UNKNOWN SIGNIFICANCE): ICD-10-CM

## 2019-07-09 DIAGNOSIS — E78.00 HYPERCHOLESTEREMIA: Primary | ICD-10-CM

## 2019-07-09 LAB
ALBUMIN SERPL BCP-MCNC: 3.7 G/DL (ref 3.5–5.2)
ALP SERPL-CCNC: 77 U/L (ref 55–135)
ALT SERPL W/O P-5'-P-CCNC: 16 U/L (ref 10–44)
ANION GAP SERPL CALC-SCNC: 10 MMOL/L (ref 8–16)
AST SERPL-CCNC: 27 U/L (ref 10–40)
BILIRUB SERPL-MCNC: 0.4 MG/DL (ref 0.1–1)
BUN SERPL-MCNC: 14 MG/DL (ref 8–23)
CALCIUM SERPL-MCNC: 9.5 MG/DL (ref 8.7–10.5)
CHLORIDE SERPL-SCNC: 108 MMOL/L (ref 95–110)
CHOLEST SERPL-MCNC: 156 MG/DL (ref 120–199)
CHOLEST/HDLC SERPL: 3.5 {RATIO} (ref 2–5)
CO2 SERPL-SCNC: 23 MMOL/L (ref 23–29)
CREAT SERPL-MCNC: 1.1 MG/DL (ref 0.5–1.4)
ERYTHROCYTE [SEDIMENTATION RATE] IN BLOOD BY WESTERGREN METHOD: 11 MM/HR (ref 0–23)
EST. GFR  (AFRICAN AMERICAN): >60 ML/MIN/1.73 M^2
EST. GFR  (NON AFRICAN AMERICAN): >60 ML/MIN/1.73 M^2
GLUCOSE SERPL-MCNC: 105 MG/DL (ref 70–110)
HDLC SERPL-MCNC: 45 MG/DL (ref 40–75)
HDLC SERPL: 28.8 % (ref 20–50)
LDLC SERPL CALC-MCNC: 79.8 MG/DL (ref 63–159)
NONHDLC SERPL-MCNC: 111 MG/DL
POTASSIUM SERPL-SCNC: 3.9 MMOL/L (ref 3.5–5.1)
PROT SERPL-MCNC: 7.4 G/DL (ref 6–8.4)
SODIUM SERPL-SCNC: 141 MMOL/L (ref 136–145)
TRIGL SERPL-MCNC: 156 MG/DL (ref 30–150)
TSH SERPL DL<=0.005 MIU/L-ACNC: 0.98 UIU/ML (ref 0.4–4)

## 2019-07-09 PROCEDURE — 99213 OFFICE O/P EST LOW 20 MIN: CPT | Mod: PBBFAC,PO | Performed by: INTERNAL MEDICINE

## 2019-07-09 PROCEDURE — 99999 PR PBB SHADOW E&M-EST. PATIENT-LVL III: CPT | Mod: PBBFAC,,, | Performed by: INTERNAL MEDICINE

## 2019-07-09 PROCEDURE — 84443 ASSAY THYROID STIM HORMONE: CPT

## 2019-07-09 PROCEDURE — 99214 OFFICE O/P EST MOD 30 MIN: CPT | Mod: S$PBB,,, | Performed by: INTERNAL MEDICINE

## 2019-07-09 PROCEDURE — 99999 PR PBB SHADOW E&M-EST. PATIENT-LVL III: ICD-10-PCS | Mod: PBBFAC,,, | Performed by: INTERNAL MEDICINE

## 2019-07-09 PROCEDURE — 85652 RBC SED RATE AUTOMATED: CPT

## 2019-07-09 PROCEDURE — 80053 COMPREHEN METABOLIC PANEL: CPT

## 2019-07-09 PROCEDURE — 84165 PROTEIN E-PHORESIS SERUM: CPT | Mod: 26,,, | Performed by: PATHOLOGY

## 2019-07-09 PROCEDURE — 99214 PR OFFICE/OUTPT VISIT, EST, LEVL IV, 30-39 MIN: ICD-10-PCS | Mod: S$PBB,,, | Performed by: INTERNAL MEDICINE

## 2019-07-09 PROCEDURE — 84165 PROTEIN E-PHORESIS SERUM: CPT

## 2019-07-09 PROCEDURE — 80061 LIPID PANEL: CPT

## 2019-07-09 PROCEDURE — 36415 COLL VENOUS BLD VENIPUNCTURE: CPT | Mod: PO

## 2019-07-09 PROCEDURE — 84165 PATHOLOGIST INTERPRETATION SPE: ICD-10-PCS | Mod: 26,,, | Performed by: PATHOLOGY

## 2019-07-09 PROCEDURE — 85025 COMPLETE CBC W/AUTO DIFF WBC: CPT

## 2019-07-09 NOTE — PROGRESS NOTES
Subjective:       Patient ID: Darnell Ruelas Jr. is a 69 y.o. male.    Chief Complaint: Follow-up; Hyperlipidemia; and Prostate Cancer    Hyperlipidemia   Associated symptoms include myalgias. Pertinent negatives include no chest pain or shortness of breath.     Past Medical History:   Diagnosis Date    Chronic hepatitis C     Hypercholesteremia     Kidney stones     Malignant neoplasm of prostate 2/14/2013    Prostate cancer      Past Surgical History:   Procedure Laterality Date    CHOLECYSTECTOMY      TONSILLECTOMY       No family history on file.  Social History     Socioeconomic History    Marital status:      Spouse name: Not on file    Number of children: Not on file    Years of education: Not on file    Highest education level: Not on file   Occupational History    Not on file   Social Needs    Financial resource strain: Not on file    Food insecurity:     Worry: Not on file     Inability: Not on file    Transportation needs:     Medical: Not on file     Non-medical: Not on file   Tobacco Use    Smoking status: Never Smoker    Smokeless tobacco: Former User   Substance and Sexual Activity    Alcohol use: Yes     Alcohol/week: 0.0 oz     Comment: rarely    Drug use: No    Sexual activity: Yes   Lifestyle    Physical activity:     Days per week: Not on file     Minutes per session: Not on file    Stress: Not on file   Relationships    Social connections:     Talks on phone: Not on file     Gets together: Not on file     Attends Baptism service: Not on file     Active member of club or organization: Not on file     Attends meetings of clubs or organizations: Not on file     Relationship status: Not on file   Other Topics Concern    Not on file   Social History Narrative    Not on file     Review of Systems   Constitutional: Positive for fatigue. Negative for activity change, appetite change, chills, diaphoresis, fever and unexpected weight change.   HENT: Negative for drooling,  ear discharge, ear pain, facial swelling, hearing loss, mouth sores, nosebleeds, postnasal drip, rhinorrhea, sinus pressure, sneezing, sore throat, tinnitus, trouble swallowing and voice change.    Eyes: Negative for photophobia, redness and visual disturbance.   Respiratory: Negative for apnea, cough, choking, chest tightness, shortness of breath and wheezing.    Cardiovascular: Negative for chest pain, palpitations and leg swelling.   Gastrointestinal: Negative for abdominal distention, abdominal pain, anal bleeding, blood in stool, constipation, diarrhea, nausea and vomiting.   Endocrine: Negative for cold intolerance, heat intolerance, polydipsia, polyphagia and polyuria.   Genitourinary: Negative for difficulty urinating, dysuria, enuresis, flank pain, frequency, genital sores, hematuria and urgency.   Musculoskeletal: Positive for arthralgias and myalgias. Negative for back pain, gait problem, joint swelling, neck pain and neck stiffness.   Skin: Negative for color change, pallor, rash and wound.   Allergic/Immunologic: Negative for food allergies and immunocompromised state.   Neurological: Negative for dizziness, tremors, seizures, syncope, facial asymmetry, speech difficulty, weakness, light-headedness, numbness and headaches.   Hematological: Negative for adenopathy. Does not bruise/bleed easily.   Psychiatric/Behavioral: Negative for agitation, behavioral problems, confusion, decreased concentration, dysphoric mood, hallucinations, self-injury, sleep disturbance and suicidal ideas. The patient is not nervous/anxious and is not hyperactive.        Objective:      Physical Exam   Constitutional: He is oriented to person, place, and time. He appears well-developed and well-nourished. No distress.   HENT:   Head: Normocephalic and atraumatic.   Eyes: Pupils are equal, round, and reactive to light.   Neck: Normal range of motion. Neck supple. No JVD present. Carotid bruit is not present. No tracheal deviation  present. No thyromegaly present.   Cardiovascular: Normal rate, regular rhythm, normal heart sounds and intact distal pulses.   Pulmonary/Chest: Effort normal and breath sounds normal. No respiratory distress. He has no wheezes. He has no rales. He exhibits no tenderness.   Abdominal: Soft. Bowel sounds are normal. He exhibits no distension. There is no tenderness. There is no rebound and no guarding.   Musculoskeletal: Normal range of motion. He exhibits no edema or tenderness.   Lymphadenopathy:     He has no cervical adenopathy.   Neurological: He is alert and oriented to person, place, and time.   Skin: Skin is warm and dry. No rash noted. He is not diaphoretic. No erythema. No pallor.   Psychiatric: He has a normal mood and affect. His behavior is normal. Judgment and thought content normal.   Nursing note and vitals reviewed.      CMP  Sodium   Date Value Ref Range Status   08/10/2018 140 136 - 145 mmol/L Final     Potassium   Date Value Ref Range Status   08/10/2018 4.3 3.5 - 5.1 mmol/L Final     Chloride   Date Value Ref Range Status   08/10/2018 107 95 - 110 mmol/L Final     CO2   Date Value Ref Range Status   08/10/2018 24 23 - 29 mmol/L Final     Glucose   Date Value Ref Range Status   08/10/2018 101 70 - 110 mg/dL Final     BUN, Bld   Date Value Ref Range Status   08/10/2018 14 8 - 23 mg/dL Final     Creatinine   Date Value Ref Range Status   08/10/2018 1.1 0.5 - 1.4 mg/dL Final     Calcium   Date Value Ref Range Status   08/10/2018 8.9 8.7 - 10.5 mg/dL Final     Total Protein   Date Value Ref Range Status   08/10/2018 7.3 6.0 - 8.4 g/dL Final     Albumin   Date Value Ref Range Status   08/10/2018 3.8 3.5 - 5.2 g/dL Final     Total Bilirubin   Date Value Ref Range Status   08/10/2018 0.5 0.1 - 1.0 mg/dL Final     Comment:     For infants and newborns, interpretation of results should be based  on gestational age, weight and in agreement with clinical  observations.  Premature Infant recommended reference  ranges:  Up to 24 hours.............<8.0 mg/dL  Up to 48 hours............<12.0 mg/dL  3-5 days..................<15.0 mg/dL  6-29 days.................<15.0 mg/dL       Alkaline Phosphatase   Date Value Ref Range Status   08/10/2018 70 55 - 135 U/L Final     AST   Date Value Ref Range Status   08/10/2018 22 10 - 40 U/L Final     ALT   Date Value Ref Range Status   08/10/2018 19 10 - 44 U/L Final     Anion Gap   Date Value Ref Range Status   08/10/2018 9 8 - 16 mmol/L Final     eGFR if    Date Value Ref Range Status   08/10/2018 >60 >60 mL/min/1.73 m^2 Final     eGFR if non    Date Value Ref Range Status   08/10/2018 >60 >60 mL/min/1.73 m^2 Final     Comment:     Calculation used to obtain the estimated glomerular filtration  rate (eGFR) is the CKD-EPI equation.        Lab Results   Component Value Date    WBC 6.04 08/10/2018    HGB 16.6 08/10/2018    HCT 47.9 08/10/2018    MCV 86 08/10/2018     08/10/2018     Lab Results   Component Value Date    CHOL 210 (H) 12/28/2018     Lab Results   Component Value Date    HDL 44 12/28/2018     Lab Results   Component Value Date    LDLCALC 149.0 12/28/2018     Lab Results   Component Value Date    TRIG 85 12/28/2018     Lab Results   Component Value Date    CHOLHDL 21.0 12/28/2018     Lab Results   Component Value Date    TSH 0.845 08/21/2017     No results found for: LABA1C, HGBA1C  Assessment:       1. Hypercholesteremia    2. Prostate cancer    3. MGUS (monoclonal gammopathy of unknown significance)        Plan:   Hypercholesteremia  -     CBC auto differential; Future; Expected date: 07/09/2019  -     Comprehensive metabolic panel; Future; Expected date: 07/09/2019  -     Sedimentation rate; Future; Expected date: 07/09/2019  -     Lipid panel; Future; Expected date: 07/09/2019  -     TSH; Future; Expected date: 07/09/2019    Prostate cancer----sees urology    MGUS (monoclonal gammopathy of unknown significance)  -     Lipid panel;  Future; Expected date: 07/09/2019  -     TSH; Future; Expected date: 07/09/2019  -     Protein electrophoresis, serum; Future; Expected date: 07/09/2019    Stable---------continue current meds.               F/u 6 months,

## 2019-07-10 ENCOUNTER — TELEPHONE (OUTPATIENT)
Dept: FAMILY MEDICINE | Facility: CLINIC | Age: 69
End: 2019-07-10

## 2019-07-10 DIAGNOSIS — R89.9 ABNORMAL LABORATORY TEST: Primary | ICD-10-CM

## 2019-07-10 LAB
BASOPHILS # BLD AUTO: 0.04 K/UL (ref 0–0.2)
BASOPHILS NFR BLD: 0.5 % (ref 0–1.9)
DIFFERENTIAL METHOD: ABNORMAL
EOSINOPHIL # BLD AUTO: 0.2 K/UL (ref 0–0.5)
EOSINOPHIL NFR BLD: 2 % (ref 0–8)
ERYTHROCYTE [DISTWIDTH] IN BLOOD BY AUTOMATED COUNT: 13.6 % (ref 11.5–14.5)
HCT VFR BLD AUTO: 49.6 % (ref 40–54)
HGB BLD-MCNC: 16 G/DL (ref 14–18)
IMM GRANULOCYTES # BLD AUTO: 0.02 K/UL (ref 0–0.04)
IMM GRANULOCYTES NFR BLD AUTO: 0.3 % (ref 0–0.5)
LYMPHOCYTES # BLD AUTO: 3.2 K/UL (ref 1–4.8)
LYMPHOCYTES NFR BLD: 41 % (ref 18–48)
MCH RBC QN AUTO: 28.9 PG (ref 27–31)
MCHC RBC AUTO-ENTMCNC: 32.3 G/DL (ref 32–36)
MCV RBC AUTO: 90 FL (ref 82–98)
MONOCYTES # BLD AUTO: 0.6 K/UL (ref 0.3–1)
MONOCYTES NFR BLD: 7.4 % (ref 4–15)
NEUTROPHILS # BLD AUTO: 3.9 K/UL (ref 1.8–7.7)
NEUTROPHILS NFR BLD: 48.8 % (ref 38–73)
NRBC BLD-RTO: 0 /100 WBC
PLATELET # BLD AUTO: 120 K/UL (ref 150–350)
PMV BLD AUTO: 12.1 FL (ref 9.2–12.9)
RBC # BLD AUTO: 5.53 M/UL (ref 4.6–6.2)
WBC # BLD AUTO: 7.88 K/UL (ref 3.9–12.7)

## 2019-07-10 NOTE — TELEPHONE ENCOUNTER
Spoke with Mr. Ruelas and informed him his labs for Platelets needed to be rechecked in one week on 7/16/19 at anytime and it's non-fasting. Also informed pt. all other labs were good. - JS,SN

## 2019-07-11 ENCOUNTER — TELEPHONE (OUTPATIENT)
Dept: FAMILY MEDICINE | Facility: CLINIC | Age: 69
End: 2019-07-11

## 2019-07-11 DIAGNOSIS — R89.9 ABNORMAL LABORATORY TEST: Primary | ICD-10-CM

## 2019-07-11 LAB
ALBUMIN SERPL ELPH-MCNC: 3.91 G/DL (ref 3.35–5.55)
ALPHA1 GLOB SERPL ELPH-MCNC: 0.29 G/DL (ref 0.17–0.41)
ALPHA2 GLOB SERPL ELPH-MCNC: 0.83 G/DL (ref 0.43–0.99)
B-GLOBULIN SERPL ELPH-MCNC: 0.8 G/DL (ref 0.5–1.1)
GAMMA GLOB SERPL ELPH-MCNC: 1.17 G/DL (ref 0.67–1.58)
PATHOLOGIST INTERPRETATION SPE: NORMAL
PROT SERPL-MCNC: 7 G/DL (ref 6–8.4)

## 2019-07-16 ENCOUNTER — LAB VISIT (OUTPATIENT)
Dept: LAB | Facility: HOSPITAL | Age: 69
End: 2019-07-16
Attending: INTERNAL MEDICINE
Payer: MEDICARE

## 2019-07-16 DIAGNOSIS — R89.9 ABNORMAL LABORATORY TEST: ICD-10-CM

## 2019-07-16 LAB
BASOPHILS # BLD AUTO: 0.04 K/UL (ref 0–0.2)
BASOPHILS NFR BLD: 0.5 % (ref 0–1.9)
DIFFERENTIAL METHOD: ABNORMAL
EOSINOPHIL # BLD AUTO: 0.2 K/UL (ref 0–0.5)
EOSINOPHIL NFR BLD: 2.5 % (ref 0–8)
ERYTHROCYTE [DISTWIDTH] IN BLOOD BY AUTOMATED COUNT: 13.8 % (ref 11.5–14.5)
HCT VFR BLD AUTO: 51.1 % (ref 40–54)
HGB BLD-MCNC: 16.4 G/DL (ref 14–18)
IMM GRANULOCYTES # BLD AUTO: 0.02 K/UL (ref 0–0.04)
IMM GRANULOCYTES NFR BLD AUTO: 0.2 % (ref 0–0.5)
LYMPHOCYTES # BLD AUTO: 4.3 K/UL (ref 1–4.8)
LYMPHOCYTES NFR BLD: 53.2 % (ref 18–48)
MCH RBC QN AUTO: 28.8 PG (ref 27–31)
MCHC RBC AUTO-ENTMCNC: 32.1 G/DL (ref 32–36)
MCV RBC AUTO: 90 FL (ref 82–98)
MONOCYTES # BLD AUTO: 0.6 K/UL (ref 0.3–1)
MONOCYTES NFR BLD: 7.6 % (ref 4–15)
NEUTROPHILS # BLD AUTO: 2.9 K/UL (ref 1.8–7.7)
NEUTROPHILS NFR BLD: 36 % (ref 38–73)
NRBC BLD-RTO: 0 /100 WBC
PLATELET # BLD AUTO: 245 K/UL (ref 150–350)
PMV BLD AUTO: 11 FL (ref 9.2–12.9)
RBC # BLD AUTO: 5.69 M/UL (ref 4.6–6.2)
WBC # BLD AUTO: 8.03 K/UL (ref 3.9–12.7)

## 2019-07-16 PROCEDURE — 85025 COMPLETE CBC W/AUTO DIFF WBC: CPT

## 2019-07-16 PROCEDURE — 36415 COLL VENOUS BLD VENIPUNCTURE: CPT | Mod: PO

## 2019-07-23 ENCOUNTER — HOSPITAL ENCOUNTER (OUTPATIENT)
Dept: RADIOLOGY | Facility: HOSPITAL | Age: 69
Discharge: HOME | End: 2019-07-23
Attending: INTERNAL MEDICINE
Payer: MEDICARE

## 2019-07-23 ENCOUNTER — OFFICE VISIT (OUTPATIENT)
Dept: FAMILY MEDICINE | Facility: CLINIC | Age: 69
End: 2019-07-23
Payer: MEDICARE

## 2019-07-23 VITALS
WEIGHT: 195.13 LBS | HEART RATE: 97 BPM | OXYGEN SATURATION: 97 % | DIASTOLIC BLOOD PRESSURE: 68 MMHG | SYSTOLIC BLOOD PRESSURE: 126 MMHG | BODY MASS INDEX: 25.74 KG/M2 | TEMPERATURE: 99 F

## 2019-07-23 DIAGNOSIS — M79.605 PAIN OF LEFT LOWER EXTREMITY: ICD-10-CM

## 2019-07-23 DIAGNOSIS — M54.10 RADICULOPATHY, UNSPECIFIED SPINAL REGION: ICD-10-CM

## 2019-07-23 DIAGNOSIS — R29.898 WEAKNESS OF LEFT LOWER EXTREMITY: Primary | ICD-10-CM

## 2019-07-23 PROCEDURE — 99999 PR PBB SHADOW E&M-EST. PATIENT-LVL IV: CPT | Mod: PBBFAC,,, | Performed by: INTERNAL MEDICINE

## 2019-07-23 PROCEDURE — 99999 PR PBB SHADOW E&M-EST. PATIENT-LVL IV: ICD-10-PCS | Mod: PBBFAC,,, | Performed by: INTERNAL MEDICINE

## 2019-07-23 PROCEDURE — 72100 X-RAY EXAM L-S SPINE 2/3 VWS: CPT | Mod: TC,FY,PO

## 2019-07-23 PROCEDURE — 99213 OFFICE O/P EST LOW 20 MIN: CPT | Mod: S$PBB,,, | Performed by: INTERNAL MEDICINE

## 2019-07-23 PROCEDURE — 99214 OFFICE O/P EST MOD 30 MIN: CPT | Mod: PBBFAC,PO | Performed by: INTERNAL MEDICINE

## 2019-07-23 PROCEDURE — 72100 X-RAY EXAM L-S SPINE 2/3 VWS: CPT | Mod: 26,,, | Performed by: RADIOLOGY

## 2019-07-23 PROCEDURE — 72100 XR LUMBAR SPINE AP AND LATERAL: ICD-10-PCS | Mod: 26,,, | Performed by: RADIOLOGY

## 2019-07-23 PROCEDURE — 99213 PR OFFICE/OUTPT VISIT, EST, LEVL III, 20-29 MIN: ICD-10-PCS | Mod: S$PBB,,, | Performed by: INTERNAL MEDICINE

## 2019-07-23 RX ORDER — METHYLPREDNISOLONE 4 MG/1
TABLET ORAL
Qty: 1 PACKAGE | Refills: 0 | Status: SHIPPED | OUTPATIENT
Start: 2019-07-23 | End: 2019-08-13

## 2019-07-23 NOTE — PROGRESS NOTES
Subjective:       Patient ID: Darnell Ruelas Jr. is a 69 y.o. male.    Chief Complaint: Follow-up and Leg Pain    Leg Pain    The incident occurred more than 1 week ago. The pain is present in the left thigh and left leg. The quality of the pain is described as burning, aching and cramping. The pain is mild. The pain has been fluctuating since onset. Associated symptoms include an inability to bear weight, muscle weakness and tingling. Pertinent negatives include no numbness. The symptoms are aggravated by movement and weight bearing.     Past Medical History:   Diagnosis Date    Chronic hepatitis C     Hypercholesteremia     Kidney stones     Malignant neoplasm of prostate 2/14/2013    Prostate cancer      Past Surgical History:   Procedure Laterality Date    CHOLECYSTECTOMY      TONSILLECTOMY       No family history on file.  Social History     Socioeconomic History    Marital status:      Spouse name: Not on file    Number of children: Not on file    Years of education: Not on file    Highest education level: Not on file   Occupational History    Not on file   Social Needs    Financial resource strain: Not on file    Food insecurity:     Worry: Not on file     Inability: Not on file    Transportation needs:     Medical: Not on file     Non-medical: Not on file   Tobacco Use    Smoking status: Never Smoker    Smokeless tobacco: Former User   Substance and Sexual Activity    Alcohol use: Yes     Alcohol/week: 0.0 oz     Comment: rarely    Drug use: No    Sexual activity: Yes   Lifestyle    Physical activity:     Days per week: Not on file     Minutes per session: Not on file    Stress: Not on file   Relationships    Social connections:     Talks on phone: Not on file     Gets together: Not on file     Attends Jain service: Not on file     Active member of club or organization: Not on file     Attends meetings of clubs or organizations: Not on file     Relationship status: Not on  file   Other Topics Concern    Not on file   Social History Narrative    Not on file     Review of Systems   Constitutional: Negative for chills and fever.   HENT: Negative.    Respiratory: Negative for apnea, cough, choking, chest tightness, shortness of breath, wheezing and stridor.    Cardiovascular: Negative for chest pain and palpitations.   Gastrointestinal: Negative for abdominal pain, nausea and vomiting.   Genitourinary: Negative for dysuria, hematuria and urgency.   Neurological: Positive for tingling. Negative for dizziness, tremors and numbness.   Psychiatric/Behavioral: Negative for agitation, behavioral problems and confusion.       Objective:      Physical Exam   Constitutional: He is oriented to person, place, and time. He appears well-developed and well-nourished.   Cardiovascular: Normal rate, regular rhythm, normal heart sounds and intact distal pulses.   Pulmonary/Chest: Effort normal and breath sounds normal.   Abdominal: Soft. Bowel sounds are normal.   Neurological: He is alert and oriented to person, place, and time.   Psychiatric: He has a normal mood and affect. His behavior is normal. Judgment and thought content normal.   Nursing note and vitals reviewed.      CMP  Sodium   Date Value Ref Range Status   07/09/2019 141 136 - 145 mmol/L Final     Potassium   Date Value Ref Range Status   07/09/2019 3.9 3.5 - 5.1 mmol/L Final     Chloride   Date Value Ref Range Status   07/09/2019 108 95 - 110 mmol/L Final     CO2   Date Value Ref Range Status   07/09/2019 23 23 - 29 mmol/L Final     Glucose   Date Value Ref Range Status   07/09/2019 105 70 - 110 mg/dL Final     BUN, Bld   Date Value Ref Range Status   07/09/2019 14 8 - 23 mg/dL Final     Creatinine   Date Value Ref Range Status   07/09/2019 1.1 0.5 - 1.4 mg/dL Final     Calcium   Date Value Ref Range Status   07/09/2019 9.5 8.7 - 10.5 mg/dL Final     Total Protein   Date Value Ref Range Status   07/09/2019 7.4 6.0 - 8.4 g/dL Final      Albumin   Date Value Ref Range Status   07/09/2019 3.7 3.5 - 5.2 g/dL Final     Total Bilirubin   Date Value Ref Range Status   07/09/2019 0.4 0.1 - 1.0 mg/dL Final     Comment:     For infants and newborns, interpretation of results should be based  on gestational age, weight and in agreement with clinical  observations.  Premature Infant recommended reference ranges:  Up to 24 hours.............<8.0 mg/dL  Up to 48 hours............<12.0 mg/dL  3-5 days..................<15.0 mg/dL  6-29 days.................<15.0 mg/dL       Alkaline Phosphatase   Date Value Ref Range Status   07/09/2019 77 55 - 135 U/L Final     AST   Date Value Ref Range Status   07/09/2019 27 10 - 40 U/L Final     ALT   Date Value Ref Range Status   07/09/2019 16 10 - 44 U/L Final     Anion Gap   Date Value Ref Range Status   07/09/2019 10 8 - 16 mmol/L Final     eGFR if    Date Value Ref Range Status   07/09/2019 >60.0 >60 mL/min/1.73 m^2 Final     eGFR if non    Date Value Ref Range Status   07/09/2019 >60.0 >60 mL/min/1.73 m^2 Final     Comment:     Calculation used to obtain the estimated glomerular filtration  rate (eGFR) is the CKD-EPI equation.        Lab Results   Component Value Date    WBC 8.03 07/16/2019    HGB 16.4 07/16/2019    HCT 51.1 07/16/2019    MCV 90 07/16/2019     07/16/2019     Lab Results   Component Value Date    CHOL 156 07/09/2019     Lab Results   Component Value Date    HDL 45 07/09/2019     Lab Results   Component Value Date    LDLCALC 79.8 07/09/2019     Lab Results   Component Value Date    TRIG 156 (H) 07/09/2019     Lab Results   Component Value Date    CHOLHDL 28.8 07/09/2019     Lab Results   Component Value Date    TSH 0.977 07/09/2019     No results found for: LABA1C, HGBA1C  Assessment:       1. Weakness of left lower extremity    2. Radiculopathy, unspecified spinal region    3. Pain of left lower extremity        Plan:   Weakness of left lower extremity  -      Cardiology Lab FANI Resting, Lower Extremities; Future  -     Cardiology Lab US Lower Extremity Veins Left; Future  -     Ambulatory referral to Physiatry    Radiculopathy, unspecified spinal region  -     X-Ray Lumbar Spine AP And Lateral; Future; Expected date: 07/23/2019    Pain of left lower extremity  -     Cardiology Lab US Lower Extremity Veins Left; Future  -     Ambulatory referral to Physiatry    Other orders  -     methylPREDNISolone (MEDROL DOSEPACK) 4 mg tablet; use as directed  Dispense: 1 Package; Refill: 0    f/u 1 month

## 2019-07-24 ENCOUNTER — TELEPHONE (OUTPATIENT)
Dept: FAMILY MEDICINE | Facility: CLINIC | Age: 69
End: 2019-07-24

## 2019-07-24 DIAGNOSIS — R20.0 LEG NUMBNESS: Primary | ICD-10-CM

## 2019-07-25 NOTE — TELEPHONE ENCOUNTER
Pt would like to call when he comes in from out of town next month   Pt states he will call back then to schedule CT

## 2019-08-06 ENCOUNTER — TELEPHONE (OUTPATIENT)
Dept: CARDIOLOGY | Facility: CLINIC | Age: 69
End: 2019-08-06

## 2019-08-20 ENCOUNTER — LAB VISIT (OUTPATIENT)
Dept: LAB | Facility: HOSPITAL | Age: 69
End: 2019-08-20
Attending: INTERNAL MEDICINE
Payer: MEDICARE

## 2019-08-20 ENCOUNTER — OFFICE VISIT (OUTPATIENT)
Dept: FAMILY MEDICINE | Facility: CLINIC | Age: 69
End: 2019-08-20
Payer: MEDICARE

## 2019-08-20 VITALS
SYSTOLIC BLOOD PRESSURE: 121 MMHG | OXYGEN SATURATION: 98 % | TEMPERATURE: 98 F | BODY MASS INDEX: 26.05 KG/M2 | WEIGHT: 197.44 LBS | DIASTOLIC BLOOD PRESSURE: 63 MMHG | HEART RATE: 83 BPM

## 2019-08-20 DIAGNOSIS — M79.604 PAIN IN BOTH LOWER EXTREMITIES: Primary | ICD-10-CM

## 2019-08-20 DIAGNOSIS — M79.605 PAIN IN BOTH LOWER EXTREMITIES: ICD-10-CM

## 2019-08-20 DIAGNOSIS — M79.604 PAIN IN BOTH LOWER EXTREMITIES: ICD-10-CM

## 2019-08-20 DIAGNOSIS — M79.605 PAIN IN BOTH LOWER EXTREMITIES: Primary | ICD-10-CM

## 2019-08-20 LAB
CK SERPL-CCNC: 198 U/L (ref 20–200)
CRP SERPL-MCNC: 0.7 MG/L (ref 0–8.2)
ERYTHROCYTE [SEDIMENTATION RATE] IN BLOOD BY WESTERGREN METHOD: 14 MM/HR (ref 0–23)

## 2019-08-20 PROCEDURE — 99213 OFFICE O/P EST LOW 20 MIN: CPT | Mod: S$PBB,,, | Performed by: INTERNAL MEDICINE

## 2019-08-20 PROCEDURE — 36415 COLL VENOUS BLD VENIPUNCTURE: CPT | Mod: PO

## 2019-08-20 PROCEDURE — 85652 RBC SED RATE AUTOMATED: CPT

## 2019-08-20 PROCEDURE — 99999 PR PBB SHADOW E&M-EST. PATIENT-LVL III: ICD-10-PCS | Mod: PBBFAC,,, | Performed by: INTERNAL MEDICINE

## 2019-08-20 PROCEDURE — 99213 OFFICE O/P EST LOW 20 MIN: CPT | Mod: PBBFAC,PO | Performed by: INTERNAL MEDICINE

## 2019-08-20 PROCEDURE — 86235 NUCLEAR ANTIGEN ANTIBODY: CPT | Mod: 59

## 2019-08-20 PROCEDURE — 86039 ANTINUCLEAR ANTIBODIES (ANA): CPT

## 2019-08-20 PROCEDURE — 99213 PR OFFICE/OUTPT VISIT, EST, LEVL III, 20-29 MIN: ICD-10-PCS | Mod: S$PBB,,, | Performed by: INTERNAL MEDICINE

## 2019-08-20 PROCEDURE — 86140 C-REACTIVE PROTEIN: CPT

## 2019-08-20 PROCEDURE — 86431 RHEUMATOID FACTOR QUANT: CPT

## 2019-08-20 PROCEDURE — 99999 PR PBB SHADOW E&M-EST. PATIENT-LVL III: CPT | Mod: PBBFAC,,, | Performed by: INTERNAL MEDICINE

## 2019-08-20 PROCEDURE — 86038 ANTINUCLEAR ANTIBODIES: CPT

## 2019-08-20 PROCEDURE — 82550 ASSAY OF CK (CPK): CPT

## 2019-08-20 RX ORDER — MELOXICAM 15 MG/1
15 TABLET ORAL DAILY PRN
Qty: 30 TABLET | Refills: 0 | Status: SHIPPED | OUTPATIENT
Start: 2019-08-20 | End: 2019-10-03 | Stop reason: SDUPTHER

## 2019-08-20 NOTE — PROGRESS NOTES
Subjective:       Patient ID: Darnell Ruelas Jr. is a 69 y.o. male.    Chief Complaint: Follow-up    F/u left leg pain/weakness-------------states symptoms went from his left leg to his right leg  Now------steroid pack did help--------    Past Medical History:   Diagnosis Date    Chronic hepatitis C     Hypercholesteremia     Kidney stones     Malignant neoplasm of prostate 2/14/2013    Prostate cancer      Past Surgical History:   Procedure Laterality Date    CHOLECYSTECTOMY      TONSILLECTOMY       No family history on file.  Social History     Socioeconomic History    Marital status:      Spouse name: Not on file    Number of children: Not on file    Years of education: Not on file    Highest education level: Not on file   Occupational History    Not on file   Social Needs    Financial resource strain: Not on file    Food insecurity:     Worry: Not on file     Inability: Not on file    Transportation needs:     Medical: Not on file     Non-medical: Not on file   Tobacco Use    Smoking status: Never Smoker    Smokeless tobacco: Former User   Substance and Sexual Activity    Alcohol use: Yes     Alcohol/week: 0.0 oz     Comment: rarely    Drug use: No    Sexual activity: Yes   Lifestyle    Physical activity:     Days per week: Not on file     Minutes per session: Not on file    Stress: Not on file   Relationships    Social connections:     Talks on phone: Not on file     Gets together: Not on file     Attends Yazidi service: Not on file     Active member of club or organization: Not on file     Attends meetings of clubs or organizations: Not on file     Relationship status: Not on file   Other Topics Concern    Not on file   Social History Narrative    Not on file     Review of Systems   Constitutional: Negative for activity change, appetite change, chills, diaphoresis, fatigue, fever and unexpected weight change.   HENT: Negative for drooling, ear discharge, ear pain, facial  swelling, hearing loss, mouth sores, nosebleeds, postnasal drip, rhinorrhea, sinus pressure, sneezing, sore throat, tinnitus, trouble swallowing and voice change.    Eyes: Negative for photophobia, redness and visual disturbance.   Respiratory: Negative for apnea, cough, choking, chest tightness, shortness of breath and wheezing.    Cardiovascular: Negative for chest pain, palpitations and leg swelling.   Gastrointestinal: Negative for abdominal distention, abdominal pain, anal bleeding, blood in stool, constipation, diarrhea, nausea and vomiting.   Endocrine: Negative for cold intolerance, heat intolerance, polydipsia, polyphagia and polyuria.   Genitourinary: Negative for difficulty urinating, dysuria, enuresis, flank pain, frequency, genital sores, hematuria and urgency.   Musculoskeletal: Positive for arthralgias and myalgias. Negative for back pain, gait problem, joint swelling, neck pain and neck stiffness.   Skin: Negative for color change, pallor, rash and wound.   Allergic/Immunologic: Negative for food allergies and immunocompromised state.   Neurological: Positive for weakness. Negative for dizziness, tremors, seizures, syncope, facial asymmetry, speech difficulty, light-headedness, numbness and headaches.   Hematological: Negative for adenopathy. Does not bruise/bleed easily.   Psychiatric/Behavioral: Negative for agitation, behavioral problems, confusion, decreased concentration, dysphoric mood, hallucinations, self-injury, sleep disturbance and suicidal ideas. The patient is not nervous/anxious and is not hyperactive.        Objective:      Physical Exam   Constitutional: He is oriented to person, place, and time. He appears well-developed and well-nourished. No distress.   HENT:   Head: Normocephalic and atraumatic.   Eyes: Pupils are equal, round, and reactive to light.   Neck: Normal range of motion. Neck supple. No JVD present. Carotid bruit is not present. No tracheal deviation present. No  thyromegaly present.   Cardiovascular: Normal rate, regular rhythm, normal heart sounds and intact distal pulses.   Pulmonary/Chest: Effort normal and breath sounds normal. No respiratory distress. He has no wheezes. He has no rales. He exhibits no tenderness.   Abdominal: Soft. Bowel sounds are normal. He exhibits no distension. There is no tenderness. There is no rebound and no guarding.   Musculoskeletal: Normal range of motion. He exhibits no edema or tenderness.   Lymphadenopathy:     He has no cervical adenopathy.   Neurological: He is alert and oriented to person, place, and time.   Skin: Skin is warm and dry. No rash noted. He is not diaphoretic. No erythema. No pallor.   Psychiatric: He has a normal mood and affect. His behavior is normal. Judgment and thought content normal.   Nursing note and vitals reviewed.      CMP  Sodium   Date Value Ref Range Status   07/09/2019 141 136 - 145 mmol/L Final     Potassium   Date Value Ref Range Status   07/09/2019 3.9 3.5 - 5.1 mmol/L Final     Chloride   Date Value Ref Range Status   07/09/2019 108 95 - 110 mmol/L Final     CO2   Date Value Ref Range Status   07/09/2019 23 23 - 29 mmol/L Final     Glucose   Date Value Ref Range Status   07/09/2019 105 70 - 110 mg/dL Final     BUN, Bld   Date Value Ref Range Status   07/09/2019 14 8 - 23 mg/dL Final     Creatinine   Date Value Ref Range Status   07/09/2019 1.1 0.5 - 1.4 mg/dL Final     Calcium   Date Value Ref Range Status   07/09/2019 9.5 8.7 - 10.5 mg/dL Final     Total Protein   Date Value Ref Range Status   07/09/2019 7.4 6.0 - 8.4 g/dL Final     Albumin   Date Value Ref Range Status   07/09/2019 3.7 3.5 - 5.2 g/dL Final     Total Bilirubin   Date Value Ref Range Status   07/09/2019 0.4 0.1 - 1.0 mg/dL Final     Comment:     For infants and newborns, interpretation of results should be based  on gestational age, weight and in agreement with clinical  observations.  Premature Infant recommended reference ranges:  Up  to 24 hours.............<8.0 mg/dL  Up to 48 hours............<12.0 mg/dL  3-5 days..................<15.0 mg/dL  6-29 days.................<15.0 mg/dL       Alkaline Phosphatase   Date Value Ref Range Status   07/09/2019 77 55 - 135 U/L Final     AST   Date Value Ref Range Status   07/09/2019 27 10 - 40 U/L Final     ALT   Date Value Ref Range Status   07/09/2019 16 10 - 44 U/L Final     Anion Gap   Date Value Ref Range Status   07/09/2019 10 8 - 16 mmol/L Final     eGFR if    Date Value Ref Range Status   07/09/2019 >60.0 >60 mL/min/1.73 m^2 Final     eGFR if non    Date Value Ref Range Status   07/09/2019 >60.0 >60 mL/min/1.73 m^2 Final     Comment:     Calculation used to obtain the estimated glomerular filtration  rate (eGFR) is the CKD-EPI equation.        Lab Results   Component Value Date    WBC 8.03 07/16/2019    HGB 16.4 07/16/2019    HCT 51.1 07/16/2019    MCV 90 07/16/2019     07/16/2019     Lab Results   Component Value Date    CHOL 156 07/09/2019     Lab Results   Component Value Date    HDL 45 07/09/2019     Lab Results   Component Value Date    LDLCALC 79.8 07/09/2019     Lab Results   Component Value Date    TRIG 156 (H) 07/09/2019     Lab Results   Component Value Date    CHOLHDL 28.8 07/09/2019     Lab Results   Component Value Date    TSH 0.977 07/09/2019     No results found for: LABA1C, HGBA1C  Assessment:       1. Pain in both lower extremities        Plan:   Pain in both lower extremities--------------hold simvastatin--------follow-  -     Sedimentation rate; Future; Expected date: 08/20/2019  -     Rheumatoid factor; Future; Expected date: 08/20/2019  -     ANJALI Screen w/Reflex; Future; Expected date: 08/20/2019  -     CK; Future; Expected date: 08/20/2019  -     C-reactive protein; Future; Expected date: 08/20/2019  -     Cardiology Lab FANI Resting, Lower Extremities; Future  -     Ambulatory referral to Physiatry    Other orders  -     meloxicam  (MOBIC) 15 MG tablet; Take 1 tablet (15 mg total) by mouth daily as needed.  Dispense: 30 tablet; Refill: 0    F/u 1 month

## 2019-08-21 LAB
ANA SER QL IF: POSITIVE
ANA TITR SER IF: NORMAL {TITER}
RHEUMATOID FACT SERPL-ACNC: 14 IU/ML (ref 0–15)

## 2019-08-22 LAB
ANTI SM ANTIBODY: 1.42 EU (ref 0–19.99)
ANTI SM/RNP ANTIBODY: 2.77 EU (ref 0–19.99)
ANTI-SM INTERPRETATION: NEGATIVE
ANTI-SM/RNP INTERPRETATION: NEGATIVE
ANTI-SSA ANTIBODY: 7.06 EU (ref 0–19.99)
ANTI-SSA INTERPRETATION: NEGATIVE
ANTI-SSB ANTIBODY: 0.6 EU (ref 0–19.99)
ANTI-SSB INTERPRETATION: NEGATIVE
DSDNA AB SER-ACNC: NORMAL [IU]/ML

## 2019-08-27 ENCOUNTER — TELEPHONE (OUTPATIENT)
Dept: FAMILY MEDICINE | Facility: CLINIC | Age: 69
End: 2019-08-27

## 2019-08-27 NOTE — TELEPHONE ENCOUNTER
----- Message from Gerson Villa sent at 8/27/2019  3:45 PM CDT -----  Contact: Pt  Please give pt a call at .[phone] (home) regarding some questions about his medications.

## 2019-08-27 NOTE — TELEPHONE ENCOUNTER
----- Message from Carito Olivia sent at 8/27/2019  1:15 PM CDT -----  Contact: pt  The pt request a return call, no additional info given and can be reached at  [phone]///[email or web address]

## 2019-08-27 NOTE — TELEPHONE ENCOUNTER
Pt has been taking mobic for 7 days  Will stop and see if pain comes back    Explained to pt to call and report if pain comes back

## 2019-09-04 ENCOUNTER — CLINICAL SUPPORT (OUTPATIENT)
Dept: CARDIOLOGY | Facility: CLINIC | Age: 69
End: 2019-09-04
Attending: INTERNAL MEDICINE
Payer: MEDICARE

## 2019-09-04 DIAGNOSIS — M79.604 PAIN IN BOTH LOWER EXTREMITIES: ICD-10-CM

## 2019-09-04 DIAGNOSIS — M79.605 PAIN IN BOTH LOWER EXTREMITIES: ICD-10-CM

## 2019-09-04 LAB — VASCULAR ANKLE BRACHIAL INDEX (ABI) RIGHT: 0.9 (ref 0.9–1.2)

## 2019-09-04 PROCEDURE — 93922 CAR US ANKLE BRACHIAL INDICES EXT LTD WO STR: ICD-10-PCS | Mod: 26,S$PBB,, | Performed by: INTERNAL MEDICINE

## 2019-09-04 PROCEDURE — 93922 UPR/L XTREMITY ART 2 LEVELS: CPT | Mod: PBBFAC | Performed by: INTERNAL MEDICINE

## 2019-09-17 ENCOUNTER — OFFICE VISIT (OUTPATIENT)
Dept: FAMILY MEDICINE | Facility: CLINIC | Age: 69
End: 2019-09-17
Payer: MEDICARE

## 2019-09-17 VITALS
HEART RATE: 87 BPM | TEMPERATURE: 98 F | OXYGEN SATURATION: 98 % | BODY MASS INDEX: 25.74 KG/M2 | SYSTOLIC BLOOD PRESSURE: 130 MMHG | DIASTOLIC BLOOD PRESSURE: 81 MMHG | WEIGHT: 195.13 LBS

## 2019-09-17 DIAGNOSIS — M79.605 PAIN IN BOTH LOWER EXTREMITIES: Primary | ICD-10-CM

## 2019-09-17 DIAGNOSIS — M79.604 PAIN IN BOTH LOWER EXTREMITIES: Primary | ICD-10-CM

## 2019-09-17 PROCEDURE — 99213 OFFICE O/P EST LOW 20 MIN: CPT | Mod: S$PBB,,, | Performed by: INTERNAL MEDICINE

## 2019-09-17 PROCEDURE — 99999 PR PBB SHADOW E&M-EST. PATIENT-LVL III: ICD-10-PCS | Mod: PBBFAC,,, | Performed by: INTERNAL MEDICINE

## 2019-09-17 PROCEDURE — 99999 PR PBB SHADOW E&M-EST. PATIENT-LVL III: CPT | Mod: PBBFAC,,, | Performed by: INTERNAL MEDICINE

## 2019-09-17 PROCEDURE — 99213 OFFICE O/P EST LOW 20 MIN: CPT | Mod: PBBFAC,PO | Performed by: INTERNAL MEDICINE

## 2019-09-17 PROCEDURE — 99213 PR OFFICE/OUTPT VISIT, EST, LEVL III, 20-29 MIN: ICD-10-PCS | Mod: S$PBB,,, | Performed by: INTERNAL MEDICINE

## 2019-09-17 NOTE — PROGRESS NOTES
Subjective:       Patient ID: Darnell Ruelas Jr. is a 69 y.o. male.    Chief Complaint: Follow-up    F/u leg pain----------much better with mobic and/or off statin------------------    Follow-up   Associated symptoms include arthralgias and myalgias. Pertinent negatives include no abdominal pain, chest pain, chills, coughing, diaphoresis, fatigue, fever, headaches, joint swelling, nausea, neck pain, numbness, rash, sore throat, vomiting or weakness.     Past Medical History:   Diagnosis Date    Chronic hepatitis C     Hypercholesteremia     Kidney stones     Malignant neoplasm of prostate 2/14/2013    Prostate cancer      Past Surgical History:   Procedure Laterality Date    CHOLECYSTECTOMY      TONSILLECTOMY       No family history on file.  Social History     Socioeconomic History    Marital status:      Spouse name: Not on file    Number of children: Not on file    Years of education: Not on file    Highest education level: Not on file   Occupational History    Not on file   Social Needs    Financial resource strain: Not on file    Food insecurity:     Worry: Not on file     Inability: Not on file    Transportation needs:     Medical: Not on file     Non-medical: Not on file   Tobacco Use    Smoking status: Never Smoker    Smokeless tobacco: Former User   Substance and Sexual Activity    Alcohol use: Yes     Alcohol/week: 0.0 oz     Comment: rarely    Drug use: No    Sexual activity: Yes   Lifestyle    Physical activity:     Days per week: Not on file     Minutes per session: Not on file    Stress: Not on file   Relationships    Social connections:     Talks on phone: Not on file     Gets together: Not on file     Attends Jehovah's witness service: Not on file     Active member of club or organization: Not on file     Attends meetings of clubs or organizations: Not on file     Relationship status: Not on file   Other Topics Concern    Not on file   Social History Narrative    Not on file      Review of Systems   Constitutional: Negative for activity change, appetite change, chills, diaphoresis, fatigue, fever and unexpected weight change.   HENT: Negative for drooling, ear discharge, ear pain, facial swelling, hearing loss, mouth sores, nosebleeds, postnasal drip, rhinorrhea, sinus pressure, sneezing, sore throat, tinnitus, trouble swallowing and voice change.    Eyes: Negative for photophobia, redness and visual disturbance.   Respiratory: Negative for apnea, cough, choking, chest tightness, shortness of breath and wheezing.    Cardiovascular: Negative for chest pain, palpitations and leg swelling.   Gastrointestinal: Negative for abdominal distention, abdominal pain, anal bleeding, blood in stool, constipation, diarrhea, nausea and vomiting.   Endocrine: Negative for cold intolerance, heat intolerance, polydipsia, polyphagia and polyuria.   Genitourinary: Negative for difficulty urinating, dysuria, enuresis, flank pain, frequency, genital sores and hematuria.   Musculoskeletal: Positive for arthralgias and myalgias. Negative for back pain, gait problem, joint swelling, neck pain and neck stiffness.   Skin: Negative for color change, pallor, rash and wound.   Allergic/Immunologic: Negative for food allergies and immunocompromised state.   Neurological: Negative for dizziness, tremors, seizures, syncope, facial asymmetry, speech difficulty, weakness, light-headedness, numbness and headaches.   Hematological: Negative for adenopathy. Does not bruise/bleed easily.   Psychiatric/Behavioral: Negative for agitation, behavioral problems, confusion, decreased concentration, dysphoric mood, hallucinations, self-injury, sleep disturbance and suicidal ideas. The patient is not nervous/anxious and is not hyperactive.        Objective:      Physical Exam   Constitutional: He is oriented to person, place, and time. He appears well-developed and well-nourished. No distress.   HENT:   Head: Normocephalic and  atraumatic.   Eyes: Pupils are equal, round, and reactive to light.   Neck: Normal range of motion. Neck supple. No JVD present. Carotid bruit is not present. No tracheal deviation present. No thyromegaly present.   Cardiovascular: Normal rate, regular rhythm, normal heart sounds and intact distal pulses.   Pulmonary/Chest: Effort normal and breath sounds normal. No respiratory distress. He has no wheezes. He has no rales. He exhibits no tenderness.   Abdominal: Soft. Bowel sounds are normal. He exhibits no distension. There is no tenderness. There is no rebound and no guarding.   Musculoskeletal: Normal range of motion. He exhibits no edema or tenderness.   Lymphadenopathy:     He has no cervical adenopathy.   Neurological: He is alert and oriented to person, place, and time.   Skin: Skin is warm and dry. No rash noted. He is not diaphoretic. No erythema. No pallor.   Psychiatric: He has a normal mood and affect. His behavior is normal. Judgment and thought content normal.   Nursing note and vitals reviewed.      CMP  Sodium   Date Value Ref Range Status   07/09/2019 141 136 - 145 mmol/L Final     Potassium   Date Value Ref Range Status   07/09/2019 3.9 3.5 - 5.1 mmol/L Final     Chloride   Date Value Ref Range Status   07/09/2019 108 95 - 110 mmol/L Final     CO2   Date Value Ref Range Status   07/09/2019 23 23 - 29 mmol/L Final     Glucose   Date Value Ref Range Status   07/09/2019 105 70 - 110 mg/dL Final     BUN, Bld   Date Value Ref Range Status   07/09/2019 14 8 - 23 mg/dL Final     Creatinine   Date Value Ref Range Status   07/09/2019 1.1 0.5 - 1.4 mg/dL Final     Calcium   Date Value Ref Range Status   07/09/2019 9.5 8.7 - 10.5 mg/dL Final     Total Protein   Date Value Ref Range Status   07/09/2019 7.4 6.0 - 8.4 g/dL Final     Albumin   Date Value Ref Range Status   07/09/2019 3.7 3.5 - 5.2 g/dL Final     Total Bilirubin   Date Value Ref Range Status   07/09/2019 0.4 0.1 - 1.0 mg/dL Final     Comment:      For infants and newborns, interpretation of results should be based  on gestational age, weight and in agreement with clinical  observations.  Premature Infant recommended reference ranges:  Up to 24 hours.............<8.0 mg/dL  Up to 48 hours............<12.0 mg/dL  3-5 days..................<15.0 mg/dL  6-29 days.................<15.0 mg/dL       Alkaline Phosphatase   Date Value Ref Range Status   07/09/2019 77 55 - 135 U/L Final     AST   Date Value Ref Range Status   07/09/2019 27 10 - 40 U/L Final     ALT   Date Value Ref Range Status   07/09/2019 16 10 - 44 U/L Final     Anion Gap   Date Value Ref Range Status   07/09/2019 10 8 - 16 mmol/L Final     eGFR if    Date Value Ref Range Status   07/09/2019 >60.0 >60 mL/min/1.73 m^2 Final     eGFR if non    Date Value Ref Range Status   07/09/2019 >60.0 >60 mL/min/1.73 m^2 Final     Comment:     Calculation used to obtain the estimated glomerular filtration  rate (eGFR) is the CKD-EPI equation.        Lab Results   Component Value Date    WBC 8.03 07/16/2019    HGB 16.4 07/16/2019    HCT 51.1 07/16/2019    MCV 90 07/16/2019     07/16/2019     Lab Results   Component Value Date    CHOL 156 07/09/2019     Lab Results   Component Value Date    HDL 45 07/09/2019     Lab Results   Component Value Date    LDLCALC 79.8 07/09/2019     Lab Results   Component Value Date    TRIG 156 (H) 07/09/2019     Lab Results   Component Value Date    CHOLHDL 28.8 07/09/2019     Lab Results   Component Value Date    TSH 0.977 07/09/2019     No results found for: LABA1C, HGBA1C  Assessment:       1. Pain in both lower extremities        Plan:   Pain in both lower extremities  -     Cardiology Lab US Lower Extremity Veins Bilateral; Future  -     Ambulatory referral to Physiatry    mobic prn.         Resume statin----------call if symptoms return.            F/u 3 month.s

## 2019-09-19 ENCOUNTER — TELEPHONE (OUTPATIENT)
Dept: FAMILY MEDICINE | Facility: CLINIC | Age: 69
End: 2019-09-19

## 2019-09-19 NOTE — TELEPHONE ENCOUNTER
----- Message from Merrill High sent at 9/19/2019  2:40 PM CDT -----  Contact: pt   Pt checking to see which dr he was referred to.           ..[phone]

## 2019-09-30 ENCOUNTER — CLINICAL SUPPORT (OUTPATIENT)
Dept: CARDIOLOGY | Facility: CLINIC | Age: 69
End: 2019-09-30
Attending: INTERNAL MEDICINE
Payer: MEDICARE

## 2019-09-30 ENCOUNTER — TELEPHONE (OUTPATIENT)
Dept: PHYSICAL MEDICINE AND REHAB | Facility: CLINIC | Age: 69
End: 2019-09-30

## 2019-09-30 DIAGNOSIS — M79.604 PAIN IN BOTH LOWER EXTREMITIES: ICD-10-CM

## 2019-09-30 DIAGNOSIS — M79.605 PAIN IN BOTH LOWER EXTREMITIES: ICD-10-CM

## 2019-09-30 PROCEDURE — 93970 EXTREMITY STUDY: CPT | Mod: PBBFAC | Performed by: INTERNAL MEDICINE

## 2019-09-30 PROCEDURE — 93970 CAR US DOPPLER VENOUS LEGS BILATERAL: ICD-10-PCS | Mod: 26,S$PBB,, | Performed by: INTERNAL MEDICINE

## 2019-09-30 NOTE — TELEPHONE ENCOUNTER
Spoke with patient several times regarding scheduling an appointment with Dr. Butcher per referral from Dr. Godfrey. Patient declined appointment twice. Once new referral was entered patient stated that he would call back to schedule appointment if needed. Attempted to call patient again today regarding scheduling appointment with Dr. Butcher. Left v/m for patient to call back to schedule appointment.

## 2019-10-03 RX ORDER — MELOXICAM 15 MG/1
15 TABLET ORAL DAILY PRN
Qty: 30 TABLET | Refills: 0 | Status: SHIPPED | OUTPATIENT
Start: 2019-10-03 | End: 2019-12-19

## 2019-12-19 RX ORDER — MELOXICAM 15 MG/1
TABLET ORAL
Qty: 30 TABLET | Refills: 12 | Status: SHIPPED | OUTPATIENT
Start: 2019-12-19 | End: 2021-01-11 | Stop reason: SDUPTHER

## 2019-12-30 RX ORDER — CETIRIZINE HYDROCHLORIDE 10 MG/1
TABLET ORAL
Qty: 90 TABLET | Refills: 4 | Status: SHIPPED | OUTPATIENT
Start: 2019-12-30 | End: 2021-06-06

## 2020-01-08 ENCOUNTER — HOSPITAL ENCOUNTER (OUTPATIENT)
Dept: RADIOLOGY | Facility: HOSPITAL | Age: 70
Discharge: HOME | End: 2020-01-08
Attending: INTERNAL MEDICINE
Payer: MEDICARE

## 2020-01-08 ENCOUNTER — TELEPHONE (OUTPATIENT)
Dept: FAMILY MEDICINE | Facility: CLINIC | Age: 70
End: 2020-01-08

## 2020-01-08 ENCOUNTER — OFFICE VISIT (OUTPATIENT)
Dept: FAMILY MEDICINE | Facility: CLINIC | Age: 70
End: 2020-01-08
Payer: MEDICARE

## 2020-01-08 VITALS
DIASTOLIC BLOOD PRESSURE: 70 MMHG | TEMPERATURE: 99 F | OXYGEN SATURATION: 98 % | BODY MASS INDEX: 25.8 KG/M2 | HEIGHT: 73 IN | HEART RATE: 89 BPM | SYSTOLIC BLOOD PRESSURE: 114 MMHG | WEIGHT: 194.69 LBS

## 2020-01-08 DIAGNOSIS — C61 PROSTATE CANCER: ICD-10-CM

## 2020-01-08 DIAGNOSIS — M25.521 RIGHT ELBOW PAIN: ICD-10-CM

## 2020-01-08 DIAGNOSIS — M17.0 BILATERAL PRIMARY OSTEOARTHRITIS OF KNEE: ICD-10-CM

## 2020-01-08 DIAGNOSIS — E78.00 HYPERCHOLESTEREMIA: Primary | ICD-10-CM

## 2020-01-08 PROCEDURE — 1126F PR PAIN SEVERITY QUANTIFIED, NO PAIN PRESENT: ICD-10-PCS | Mod: ,,, | Performed by: INTERNAL MEDICINE

## 2020-01-08 PROCEDURE — 1159F MED LIST DOCD IN RCRD: CPT | Mod: ,,, | Performed by: INTERNAL MEDICINE

## 2020-01-08 PROCEDURE — 99999 PR PBB SHADOW E&M-EST. PATIENT-LVL IV: CPT | Mod: PBBFAC,,, | Performed by: INTERNAL MEDICINE

## 2020-01-08 PROCEDURE — 73070 X-RAY EXAM OF ELBOW: CPT | Mod: TC,FY,PO,RT

## 2020-01-08 PROCEDURE — 99214 OFFICE O/P EST MOD 30 MIN: CPT | Mod: S$PBB,,, | Performed by: INTERNAL MEDICINE

## 2020-01-08 PROCEDURE — 1159F PR MEDICATION LIST DOCUMENTED IN MEDICAL RECORD: ICD-10-PCS | Mod: ,,, | Performed by: INTERNAL MEDICINE

## 2020-01-08 PROCEDURE — 1126F AMNT PAIN NOTED NONE PRSNT: CPT | Mod: ,,, | Performed by: INTERNAL MEDICINE

## 2020-01-08 PROCEDURE — 99214 PR OFFICE/OUTPT VISIT, EST, LEVL IV, 30-39 MIN: ICD-10-PCS | Mod: S$PBB,,, | Performed by: INTERNAL MEDICINE

## 2020-01-08 PROCEDURE — 73070 X-RAY EXAM OF ELBOW: CPT | Mod: 26,RT,, | Performed by: RADIOLOGY

## 2020-01-08 PROCEDURE — 99214 OFFICE O/P EST MOD 30 MIN: CPT | Mod: PBBFAC,PO | Performed by: INTERNAL MEDICINE

## 2020-01-08 PROCEDURE — 99999 PR PBB SHADOW E&M-EST. PATIENT-LVL IV: ICD-10-PCS | Mod: PBBFAC,,, | Performed by: INTERNAL MEDICINE

## 2020-01-08 PROCEDURE — 73070 XR ELBOW 2 VIEWS RIGHT: ICD-10-PCS | Mod: 26,RT,, | Performed by: RADIOLOGY

## 2020-01-08 NOTE — PROGRESS NOTES
Subjective:       Patient ID: Darnell Ruelas Jr. is a 69 y.o. male.    Chief Complaint: Follow-up; Hyperlipidemia; and Prostate Cancer    Follow-up   Associated symptoms include arthralgias and myalgias. Pertinent negatives include no abdominal pain, chest pain, chills, coughing, diaphoresis, fatigue, fever, headaches, joint swelling, nausea, neck pain, numbness, rash, sore throat, vomiting or weakness.   Hyperlipidemia   Associated symptoms include myalgias. Pertinent negatives include no chest pain or shortness of breath.     Past Medical History:   Diagnosis Date    Chronic hepatitis C     Hypercholesteremia     Kidney stones     Malignant neoplasm of prostate 2/14/2013    Prostate cancer      Past Surgical History:   Procedure Laterality Date    CHOLECYSTECTOMY      TONSILLECTOMY       No family history on file.  Social History     Socioeconomic History    Marital status:      Spouse name: Not on file    Number of children: Not on file    Years of education: Not on file    Highest education level: Not on file   Occupational History    Not on file   Social Needs    Financial resource strain: Not on file    Food insecurity:     Worry: Not on file     Inability: Not on file    Transportation needs:     Medical: Not on file     Non-medical: Not on file   Tobacco Use    Smoking status: Never Smoker    Smokeless tobacco: Former User   Substance and Sexual Activity    Alcohol use: Yes     Alcohol/week: 0.0 standard drinks     Comment: rarely    Drug use: No    Sexual activity: Yes   Lifestyle    Physical activity:     Days per week: Not on file     Minutes per session: Not on file    Stress: Not on file   Relationships    Social connections:     Talks on phone: Not on file     Gets together: Not on file     Attends [employer] service: Not on file     Active member of club or organization: Not on file     Attends meetings of clubs or organizations: Not on file     Relationship status: Not on  file   Other Topics Concern    Not on file   Social History Narrative    Not on file     Review of Systems   Constitutional: Negative for activity change, appetite change, chills, diaphoresis, fatigue, fever and unexpected weight change.   HENT: Negative for drooling, ear discharge, ear pain, facial swelling, hearing loss, mouth sores, nosebleeds, postnasal drip, rhinorrhea, sinus pressure, sneezing, sore throat, tinnitus, trouble swallowing and voice change.    Eyes: Negative for photophobia, redness and visual disturbance.   Respiratory: Negative for apnea, cough, choking, chest tightness, shortness of breath and wheezing.    Cardiovascular: Negative for chest pain, palpitations and leg swelling.   Gastrointestinal: Negative for abdominal distention, abdominal pain, anal bleeding, blood in stool, constipation, diarrhea, nausea and vomiting.   Endocrine: Negative for cold intolerance, heat intolerance, polydipsia, polyphagia and polyuria.   Genitourinary: Negative for difficulty urinating, dysuria, enuresis, flank pain, frequency, genital sores, hematuria and urgency.   Musculoskeletal: Positive for arthralgias and myalgias. Negative for back pain, gait problem, joint swelling, neck pain and neck stiffness.   Skin: Negative for color change, pallor, rash and wound.   Allergic/Immunologic: Negative for food allergies and immunocompromised state.   Neurological: Negative for dizziness, tremors, seizures, syncope, facial asymmetry, speech difficulty, weakness, light-headedness, numbness and headaches.   Hematological: Negative for adenopathy. Does not bruise/bleed easily.   Psychiatric/Behavioral: Negative for agitation, behavioral problems, confusion, decreased concentration, dysphoric mood, hallucinations, self-injury, sleep disturbance and suicidal ideas. The patient is not nervous/anxious and is not hyperactive.        Objective:      Physical Exam   Constitutional: He is oriented to person, place, and time. He  appears well-developed and well-nourished. No distress.   HENT:   Head: Normocephalic and atraumatic.   Eyes: Pupils are equal, round, and reactive to light.   Neck: Normal range of motion. Neck supple. No JVD present. Carotid bruit is not present. No tracheal deviation present. No thyromegaly present.   Cardiovascular: Normal rate, regular rhythm, normal heart sounds and intact distal pulses.   Pulmonary/Chest: Effort normal and breath sounds normal. No respiratory distress. He has no wheezes. He has no rales. He exhibits no tenderness.   Abdominal: Soft. Bowel sounds are normal. He exhibits no distension. There is no tenderness. There is no rebound and no guarding.   Musculoskeletal: Normal range of motion. He exhibits no edema or tenderness.   Lymphadenopathy:     He has no cervical adenopathy.   Neurological: He is alert and oriented to person, place, and time.   Skin: Skin is warm and dry. No rash noted. He is not diaphoretic. No erythema. No pallor.   Psychiatric: He has a normal mood and affect. His behavior is normal. Judgment and thought content normal.   Nursing note and vitals reviewed.      CMP  Sodium   Date Value Ref Range Status   07/09/2019 141 136 - 145 mmol/L Final     Potassium   Date Value Ref Range Status   07/09/2019 3.9 3.5 - 5.1 mmol/L Final     Chloride   Date Value Ref Range Status   07/09/2019 108 95 - 110 mmol/L Final     CO2   Date Value Ref Range Status   07/09/2019 23 23 - 29 mmol/L Final     Glucose   Date Value Ref Range Status   07/09/2019 105 70 - 110 mg/dL Final     BUN, Bld   Date Value Ref Range Status   07/09/2019 14 8 - 23 mg/dL Final     Creatinine   Date Value Ref Range Status   07/09/2019 1.1 0.5 - 1.4 mg/dL Final     Calcium   Date Value Ref Range Status   07/09/2019 9.5 8.7 - 10.5 mg/dL Final     Total Protein   Date Value Ref Range Status   07/09/2019 7.4 6.0 - 8.4 g/dL Final     Albumin   Date Value Ref Range Status   07/09/2019 3.7 3.5 - 5.2 g/dL Final     Total  Bilirubin   Date Value Ref Range Status   07/09/2019 0.4 0.1 - 1.0 mg/dL Final     Comment:     For infants and newborns, interpretation of results should be based  on gestational age, weight and in agreement with clinical  observations.  Premature Infant recommended reference ranges:  Up to 24 hours.............<8.0 mg/dL  Up to 48 hours............<12.0 mg/dL  3-5 days..................<15.0 mg/dL  6-29 days.................<15.0 mg/dL       Alkaline Phosphatase   Date Value Ref Range Status   07/09/2019 77 55 - 135 U/L Final     AST   Date Value Ref Range Status   07/09/2019 27 10 - 40 U/L Final     ALT   Date Value Ref Range Status   07/09/2019 16 10 - 44 U/L Final     Anion Gap   Date Value Ref Range Status   07/09/2019 10 8 - 16 mmol/L Final     eGFR if    Date Value Ref Range Status   07/09/2019 >60.0 >60 mL/min/1.73 m^2 Final     eGFR if non    Date Value Ref Range Status   07/09/2019 >60.0 >60 mL/min/1.73 m^2 Final     Comment:     Calculation used to obtain the estimated glomerular filtration  rate (eGFR) is the CKD-EPI equation.        Lab Results   Component Value Date    WBC 8.03 07/16/2019    HGB 16.4 07/16/2019    HCT 51.1 07/16/2019    MCV 90 07/16/2019     07/16/2019     Lab Results   Component Value Date    CHOL 156 07/09/2019     Lab Results   Component Value Date    HDL 45 07/09/2019     Lab Results   Component Value Date    LDLCALC 79.8 07/09/2019     Lab Results   Component Value Date    TRIG 156 (H) 07/09/2019     Lab Results   Component Value Date    CHOLHDL 28.8 07/09/2019     Lab Results   Component Value Date    TSH 0.977 07/09/2019     No results found for: LABA1C, HGBA1C  Assessment:       1. Hypercholesteremia    2. Bilateral primary osteoarthritis of knee    3. Prostate cancer    4. Right elbow pain        Plan:   Hypercholesteremia    Bilateral primary osteoarthritis of knee    Prostate cancer    Right elbow pain  -     X-Ray Elbow 2 Views Right;  Future; Expected date: 01/08/2020    Stable---------------------continue current meds-------------------f/u 6 months----------

## 2020-07-08 ENCOUNTER — OFFICE VISIT (OUTPATIENT)
Dept: FAMILY MEDICINE | Facility: CLINIC | Age: 70
End: 2020-07-08
Payer: MEDICARE

## 2020-07-08 ENCOUNTER — LAB VISIT (OUTPATIENT)
Dept: LAB | Facility: HOSPITAL | Age: 70
End: 2020-07-08
Attending: INTERNAL MEDICINE
Payer: MEDICARE

## 2020-07-08 VITALS
BODY MASS INDEX: 25.46 KG/M2 | RESPIRATION RATE: 16 BRPM | TEMPERATURE: 98 F | SYSTOLIC BLOOD PRESSURE: 116 MMHG | OXYGEN SATURATION: 95 % | WEIGHT: 193 LBS | DIASTOLIC BLOOD PRESSURE: 70 MMHG | HEART RATE: 73 BPM

## 2020-07-08 DIAGNOSIS — E78.00 HYPERCHOLESTEREMIA: ICD-10-CM

## 2020-07-08 DIAGNOSIS — E78.00 HYPERCHOLESTEREMIA: Primary | ICD-10-CM

## 2020-07-08 DIAGNOSIS — Z85.46 HISTORY OF PROSTATE CANCER: ICD-10-CM

## 2020-07-08 LAB
ALBUMIN SERPL BCP-MCNC: 4 G/DL (ref 3.5–5.2)
ALP SERPL-CCNC: 85 U/L (ref 55–135)
ALT SERPL W/O P-5'-P-CCNC: 14 U/L (ref 10–44)
ANION GAP SERPL CALC-SCNC: 8 MMOL/L (ref 8–16)
AST SERPL-CCNC: 20 U/L (ref 10–40)
BASOPHILS # BLD AUTO: 0.03 K/UL (ref 0–0.2)
BASOPHILS NFR BLD: 0.4 % (ref 0–1.9)
BILIRUB SERPL-MCNC: 0.4 MG/DL (ref 0.1–1)
BUN SERPL-MCNC: 17 MG/DL (ref 8–23)
CALCIUM SERPL-MCNC: 9.1 MG/DL (ref 8.7–10.5)
CHLORIDE SERPL-SCNC: 107 MMOL/L (ref 95–110)
CHOLEST SERPL-MCNC: 236 MG/DL (ref 120–199)
CHOLEST/HDLC SERPL: 5 {RATIO} (ref 2–5)
CO2 SERPL-SCNC: 28 MMOL/L (ref 23–29)
CREAT SERPL-MCNC: 1.2 MG/DL (ref 0.5–1.4)
DIFFERENTIAL METHOD: ABNORMAL
EOSINOPHIL # BLD AUTO: 0.2 K/UL (ref 0–0.5)
EOSINOPHIL NFR BLD: 2.1 % (ref 0–8)
ERYTHROCYTE [DISTWIDTH] IN BLOOD BY AUTOMATED COUNT: 13.9 % (ref 11.5–14.5)
EST. GFR  (AFRICAN AMERICAN): >60 ML/MIN/1.73 M^2
EST. GFR  (NON AFRICAN AMERICAN): >60 ML/MIN/1.73 M^2
GLUCOSE SERPL-MCNC: 83 MG/DL (ref 70–110)
HCT VFR BLD AUTO: 50.5 % (ref 40–54)
HDLC SERPL-MCNC: 47 MG/DL (ref 40–75)
HDLC SERPL: 19.9 % (ref 20–50)
HGB BLD-MCNC: 16.1 G/DL (ref 14–18)
IMM GRANULOCYTES # BLD AUTO: 0.03 K/UL (ref 0–0.04)
IMM GRANULOCYTES NFR BLD AUTO: 0.4 % (ref 0–0.5)
LDLC SERPL CALC-MCNC: 147 MG/DL (ref 63–159)
LYMPHOCYTES # BLD AUTO: 3.2 K/UL (ref 1–4.8)
LYMPHOCYTES NFR BLD: 43 % (ref 18–48)
MCH RBC QN AUTO: 28.3 PG (ref 27–31)
MCHC RBC AUTO-ENTMCNC: 31.9 G/DL (ref 32–36)
MCV RBC AUTO: 89 FL (ref 82–98)
MONOCYTES # BLD AUTO: 0.6 K/UL (ref 0.3–1)
MONOCYTES NFR BLD: 8.2 % (ref 4–15)
NEUTROPHILS # BLD AUTO: 3.5 K/UL (ref 1.8–7.7)
NEUTROPHILS NFR BLD: 45.9 % (ref 38–73)
NONHDLC SERPL-MCNC: 189 MG/DL
NRBC BLD-RTO: 0 /100 WBC
PLATELET # BLD AUTO: 262 K/UL (ref 150–350)
PMV BLD AUTO: 11.6 FL (ref 9.2–12.9)
POTASSIUM SERPL-SCNC: 4.5 MMOL/L (ref 3.5–5.1)
PROT SERPL-MCNC: 7.8 G/DL (ref 6–8.4)
RBC # BLD AUTO: 5.68 M/UL (ref 4.6–6.2)
SODIUM SERPL-SCNC: 143 MMOL/L (ref 136–145)
TRIGL SERPL-MCNC: 210 MG/DL (ref 30–150)
WBC # BLD AUTO: 7.54 K/UL (ref 3.9–12.7)

## 2020-07-08 PROCEDURE — 99999 PR PBB SHADOW E&M-EST. PATIENT-LVL III: ICD-10-PCS | Mod: PBBFAC,,, | Performed by: INTERNAL MEDICINE

## 2020-07-08 PROCEDURE — 80053 COMPREHEN METABOLIC PANEL: CPT

## 2020-07-08 PROCEDURE — 85025 COMPLETE CBC W/AUTO DIFF WBC: CPT

## 2020-07-08 PROCEDURE — 99214 PR OFFICE/OUTPT VISIT, EST, LEVL IV, 30-39 MIN: ICD-10-PCS | Mod: S$PBB,,, | Performed by: INTERNAL MEDICINE

## 2020-07-08 PROCEDURE — 99999 PR PBB SHADOW E&M-EST. PATIENT-LVL III: CPT | Mod: PBBFAC,,, | Performed by: INTERNAL MEDICINE

## 2020-07-08 PROCEDURE — 36415 COLL VENOUS BLD VENIPUNCTURE: CPT | Mod: PO

## 2020-07-08 PROCEDURE — 80061 LIPID PANEL: CPT

## 2020-07-08 PROCEDURE — 99213 OFFICE O/P EST LOW 20 MIN: CPT | Mod: PBBFAC,PO | Performed by: INTERNAL MEDICINE

## 2020-07-08 PROCEDURE — 99214 OFFICE O/P EST MOD 30 MIN: CPT | Mod: S$PBB,,, | Performed by: INTERNAL MEDICINE

## 2020-07-08 NOTE — PROGRESS NOTES
Subjective:       Patient ID: Darnell Ruelas Jr. is a 70 y.o. male.    Chief Complaint: Follow-up, Hyperlipidemia, Prostate Cancer, and Hypertension    Follow-up  Associated symptoms include arthralgias and myalgias. Pertinent negatives include no abdominal pain, chest pain, chills, coughing, diaphoresis, fatigue, fever, headaches, joint swelling, nausea, neck pain, numbness, rash, sore throat, vomiting or weakness.   Hyperlipidemia  Associated symptoms include myalgias. Pertinent negatives include no chest pain or shortness of breath.   Hypertension  Pertinent negatives include no chest pain, headaches, neck pain, palpitations or shortness of breath.     Past Medical History:   Diagnosis Date    Chronic hepatitis C     Hypercholesteremia     Kidney stones     Malignant neoplasm of prostate 2/14/2013    Prostate cancer      Past Surgical History:   Procedure Laterality Date    CHOLECYSTECTOMY      TONSILLECTOMY       No family history on file.  Social History     Socioeconomic History    Marital status:      Spouse name: Not on file    Number of children: Not on file    Years of education: Not on file    Highest education level: Not on file   Occupational History    Not on file   Social Needs    Financial resource strain: Not on file    Food insecurity     Worry: Not on file     Inability: Not on file    Transportation needs     Medical: Not on file     Non-medical: Not on file   Tobacco Use    Smoking status: Never Smoker    Smokeless tobacco: Former User   Substance and Sexual Activity    Alcohol use: Yes     Alcohol/week: 0.0 standard drinks     Comment: rarely    Drug use: No    Sexual activity: Yes   Lifestyle    Physical activity     Days per week: Not on file     Minutes per session: Not on file    Stress: Not on file   Relationships    Social connections     Talks on phone: Not on file     Gets together: Not on file     Attends Nondenominational service: Not on file     Active member of  club or organization: Not on file     Attends meetings of clubs or organizations: Not on file     Relationship status: Not on file   Other Topics Concern    Not on file   Social History Narrative    Not on file     Review of Systems   Constitutional: Negative for activity change, appetite change, chills, diaphoresis, fatigue, fever and unexpected weight change.   HENT: Negative for drooling, ear discharge, ear pain, facial swelling, hearing loss, mouth sores, nosebleeds, postnasal drip, rhinorrhea, sinus pressure, sneezing, sore throat, tinnitus, trouble swallowing and voice change.    Eyes: Negative for photophobia, redness and visual disturbance.   Respiratory: Negative for apnea, cough, choking, chest tightness, shortness of breath and wheezing.    Cardiovascular: Negative for chest pain, palpitations and leg swelling.   Gastrointestinal: Negative for abdominal distention, abdominal pain, anal bleeding, blood in stool, constipation, diarrhea, nausea and vomiting.   Endocrine: Negative for cold intolerance, heat intolerance, polydipsia, polyphagia and polyuria.   Genitourinary: Negative for difficulty urinating, dysuria, enuresis, flank pain, frequency, genital sores, hematuria and urgency.   Musculoskeletal: Positive for arthralgias and myalgias. Negative for back pain, gait problem, joint swelling, neck pain and neck stiffness.   Skin: Negative for color change, pallor, rash and wound.   Allergic/Immunologic: Negative for food allergies and immunocompromised state.   Neurological: Negative for dizziness, tremors, seizures, syncope, facial asymmetry, speech difficulty, weakness, light-headedness, numbness and headaches.   Hematological: Negative for adenopathy. Does not bruise/bleed easily.   Psychiatric/Behavioral: Negative for agitation, behavioral problems, confusion, decreased concentration, dysphoric mood, hallucinations, self-injury, sleep disturbance and suicidal ideas. The patient is not nervous/anxious  and is not hyperactive.        Objective:      Physical Exam  Vitals signs and nursing note reviewed.   Constitutional:       General: He is not in acute distress.     Appearance: He is well-developed. He is not diaphoretic.   HENT:      Head: Normocephalic and atraumatic.      Mouth/Throat:      Pharynx: No oropharyngeal exudate.   Eyes:      General: No scleral icterus.     Pupils: Pupils are equal, round, and reactive to light.   Neck:      Musculoskeletal: Normal range of motion and neck supple.      Thyroid: No thyromegaly.      Vascular: No carotid bruit or JVD.      Trachea: No tracheal deviation.   Cardiovascular:      Rate and Rhythm: Normal rate and regular rhythm.      Heart sounds: Normal heart sounds.   Pulmonary:      Effort: Pulmonary effort is normal. No respiratory distress.      Breath sounds: Normal breath sounds. No wheezing or rales.   Chest:      Chest wall: No tenderness.   Abdominal:      General: Bowel sounds are normal. There is no distension.      Palpations: Abdomen is soft.      Tenderness: There is no abdominal tenderness. There is no guarding or rebound.   Musculoskeletal: Normal range of motion.         General: No tenderness.   Lymphadenopathy:      Cervical: No cervical adenopathy.   Skin:     General: Skin is warm and dry.      Coloration: Skin is not pale.      Findings: No erythema or rash.   Neurological:      Mental Status: He is alert and oriented to person, place, and time.      Coordination: Coordination normal.   Psychiatric:         Behavior: Behavior normal.         Thought Content: Thought content normal.         Judgment: Judgment normal.         CMP  Sodium   Date Value Ref Range Status   07/09/2019 141 136 - 145 mmol/L Final     Potassium   Date Value Ref Range Status   07/09/2019 3.9 3.5 - 5.1 mmol/L Final     Chloride   Date Value Ref Range Status   07/09/2019 108 95 - 110 mmol/L Final     CO2   Date Value Ref Range Status   07/09/2019 23 23 - 29 mmol/L Final      Glucose   Date Value Ref Range Status   07/09/2019 105 70 - 110 mg/dL Final     BUN, Bld   Date Value Ref Range Status   07/09/2019 14 8 - 23 mg/dL Final     Creatinine   Date Value Ref Range Status   07/09/2019 1.1 0.5 - 1.4 mg/dL Final     Calcium   Date Value Ref Range Status   07/09/2019 9.5 8.7 - 10.5 mg/dL Final     Total Protein   Date Value Ref Range Status   07/09/2019 7.4 6.0 - 8.4 g/dL Final     Albumin   Date Value Ref Range Status   07/09/2019 3.7 3.5 - 5.2 g/dL Final     Total Bilirubin   Date Value Ref Range Status   07/09/2019 0.4 0.1 - 1.0 mg/dL Final     Comment:     For infants and newborns, interpretation of results should be based  on gestational age, weight and in agreement with clinical  observations.  Premature Infant recommended reference ranges:  Up to 24 hours.............<8.0 mg/dL  Up to 48 hours............<12.0 mg/dL  3-5 days..................<15.0 mg/dL  6-29 days.................<15.0 mg/dL       Alkaline Phosphatase   Date Value Ref Range Status   07/09/2019 77 55 - 135 U/L Final     AST   Date Value Ref Range Status   07/09/2019 27 10 - 40 U/L Final     ALT   Date Value Ref Range Status   07/09/2019 16 10 - 44 U/L Final     Anion Gap   Date Value Ref Range Status   07/09/2019 10 8 - 16 mmol/L Final     eGFR if    Date Value Ref Range Status   07/09/2019 >60.0 >60 mL/min/1.73 m^2 Final     eGFR if non    Date Value Ref Range Status   07/09/2019 >60.0 >60 mL/min/1.73 m^2 Final     Comment:     Calculation used to obtain the estimated glomerular filtration  rate (eGFR) is the CKD-EPI equation.        Lab Results   Component Value Date    WBC 8.03 07/16/2019    HGB 16.4 07/16/2019    HCT 51.1 07/16/2019    MCV 90 07/16/2019     07/16/2019     Lab Results   Component Value Date    CHOL 156 07/09/2019     Lab Results   Component Value Date    HDL 45 07/09/2019     Lab Results   Component Value Date    LDLCALC 79.8 07/09/2019     Lab Results    Component Value Date    TRIG 156 (H) 07/09/2019     Lab Results   Component Value Date    CHOLHDL 28.8 07/09/2019     Lab Results   Component Value Date    TSH 0.977 07/09/2019     No results found for: LABA1C, HGBA1C  Assessment:       1. Hypercholesteremia    2. History of prostate cancer        Plan:   Hypercholesteremia----he stopped his statin--  -     Comprehensive metabolic panel; Future; Expected date: 07/08/2020  -     CBC auto differential; Future; Expected date: 07/08/2020  -     Lipid Panel; Future; Expected date: 07/08/2020    History of prostate cancer    Stable-----------------continue meds,         Sees urology dr flowers.          F/u 6 months-

## 2020-07-09 ENCOUNTER — TELEPHONE (OUTPATIENT)
Dept: FAMILY MEDICINE | Facility: CLINIC | Age: 70
End: 2020-07-09

## 2020-07-20 ENCOUNTER — TELEPHONE (OUTPATIENT)
Dept: FAMILY MEDICINE | Facility: CLINIC | Age: 70
End: 2020-07-20

## 2020-07-20 NOTE — TELEPHONE ENCOUNTER
----- Message from Angelina Bird sent at 7/20/2020 10:53 AM CDT -----  Contact: self/[phone]  Patient would like a call back from Dr Godfrey regarding some personal issues. Please call back at [phone]. Thanks/ar

## 2020-07-21 ENCOUNTER — PATIENT MESSAGE (OUTPATIENT)
Dept: FAMILY MEDICINE | Facility: CLINIC | Age: 70
End: 2020-07-21

## 2020-07-31 ENCOUNTER — TELEPHONE (OUTPATIENT)
Dept: FAMILY MEDICINE | Facility: CLINIC | Age: 70
End: 2020-07-31

## 2020-07-31 NOTE — TELEPHONE ENCOUNTER
----- Message from Charo Ferrari sent at 7/31/2020  1:06 PM CDT -----  Regarding: email  Contact: patient  Patient states that he left a message on kristie several days ago but has not heard back regarding some testes he was inquiring about, please call him  back at [phone]

## 2020-08-03 ENCOUNTER — TELEPHONE (OUTPATIENT)
Dept: FAMILY MEDICINE | Facility: CLINIC | Age: 70
End: 2020-08-03

## 2020-08-03 NOTE — TELEPHONE ENCOUNTER
----- Message from Leonela Marmolejo sent at 7/31/2020  2:05 PM CDT -----  Regarding: missed call  Type:  Patient Returning Call    Who Called:pt  Who Left Message for Patient:staff  Does the patient know what this is regarding?:n/a  Would the patient rather a call back or a response via [facility]? Call back   Best Call Back Number:[phone]  Additional Information: Please call back. Thanks

## 2020-08-14 ENCOUNTER — LAB VISIT (OUTPATIENT)
Dept: LAB | Facility: HOSPITAL | Age: 70
End: 2020-08-14
Payer: MEDICARE

## 2020-08-14 ENCOUNTER — OFFICE VISIT (OUTPATIENT)
Dept: FAMILY MEDICINE | Facility: CLINIC | Age: 70
End: 2020-08-14
Payer: MEDICARE

## 2020-08-14 VITALS
BODY MASS INDEX: 25.86 KG/M2 | HEIGHT: 73 IN | OXYGEN SATURATION: 96 % | TEMPERATURE: 99 F | WEIGHT: 195.13 LBS | SYSTOLIC BLOOD PRESSURE: 100 MMHG | HEART RATE: 90 BPM | DIASTOLIC BLOOD PRESSURE: 68 MMHG

## 2020-08-14 DIAGNOSIS — R55 NEAR SYNCOPE: ICD-10-CM

## 2020-08-14 DIAGNOSIS — E78.00 HYPERCHOLESTEREMIA: ICD-10-CM

## 2020-08-14 DIAGNOSIS — C61 PROSTATE CANCER: ICD-10-CM

## 2020-08-14 DIAGNOSIS — R55 NEAR SYNCOPE: Primary | ICD-10-CM

## 2020-08-14 LAB
ANION GAP SERPL CALC-SCNC: 8 MMOL/L (ref 8–16)
BASOPHILS # BLD AUTO: 0.04 K/UL (ref 0–0.2)
BASOPHILS NFR BLD: 0.4 % (ref 0–1.9)
BUN SERPL-MCNC: 13 MG/DL (ref 8–23)
CALCIUM SERPL-MCNC: 9 MG/DL (ref 8.7–10.5)
CHLORIDE SERPL-SCNC: 109 MMOL/L (ref 95–110)
CO2 SERPL-SCNC: 27 MMOL/L (ref 23–29)
CREAT SERPL-MCNC: 1.2 MG/DL (ref 0.5–1.4)
DIFFERENTIAL METHOD: ABNORMAL
EOSINOPHIL # BLD AUTO: 0.1 K/UL (ref 0–0.5)
EOSINOPHIL NFR BLD: 0.7 % (ref 0–8)
ERYTHROCYTE [DISTWIDTH] IN BLOOD BY AUTOMATED COUNT: 13.2 % (ref 11.5–14.5)
EST. GFR  (AFRICAN AMERICAN): >60 ML/MIN/1.73 M^2
EST. GFR  (NON AFRICAN AMERICAN): >60 ML/MIN/1.73 M^2
GLUCOSE SERPL-MCNC: 85 MG/DL (ref 70–110)
HCT VFR BLD AUTO: 52.6 % (ref 40–54)
HGB BLD-MCNC: 16.7 G/DL (ref 14–18)
IMM GRANULOCYTES # BLD AUTO: 0.03 K/UL (ref 0–0.04)
IMM GRANULOCYTES NFR BLD AUTO: 0.3 % (ref 0–0.5)
LYMPHOCYTES # BLD AUTO: 1.8 K/UL (ref 1–4.8)
LYMPHOCYTES NFR BLD: 18.2 % (ref 18–48)
MCH RBC QN AUTO: 28.7 PG (ref 27–31)
MCHC RBC AUTO-ENTMCNC: 31.7 G/DL (ref 32–36)
MCV RBC AUTO: 90 FL (ref 82–98)
MONOCYTES # BLD AUTO: 0.6 K/UL (ref 0.3–1)
MONOCYTES NFR BLD: 6.2 % (ref 4–15)
NEUTROPHILS # BLD AUTO: 7.4 K/UL (ref 1.8–7.7)
NEUTROPHILS NFR BLD: 74.2 % (ref 38–73)
NRBC BLD-RTO: 0 /100 WBC
PLATELET # BLD AUTO: 209 K/UL (ref 150–350)
PMV BLD AUTO: 12.5 FL (ref 9.2–12.9)
POTASSIUM SERPL-SCNC: 4.4 MMOL/L (ref 3.5–5.1)
RBC # BLD AUTO: 5.82 M/UL (ref 4.6–6.2)
SODIUM SERPL-SCNC: 144 MMOL/L (ref 136–145)
WBC # BLD AUTO: 9.98 K/UL (ref 3.9–12.7)

## 2020-08-14 PROCEDURE — 99214 PR OFFICE/OUTPT VISIT, EST, LEVL IV, 30-39 MIN: ICD-10-PCS | Mod: 25,S$PBB,, | Performed by: FAMILY MEDICINE

## 2020-08-14 PROCEDURE — 99999 PR PBB SHADOW E&M-EST. PATIENT-LVL III: CPT | Mod: PBBFAC,,, | Performed by: FAMILY MEDICINE

## 2020-08-14 PROCEDURE — 99999 PR PBB SHADOW E&M-EST. PATIENT-LVL III: ICD-10-PCS | Mod: PBBFAC,,, | Performed by: FAMILY MEDICINE

## 2020-08-14 PROCEDURE — 93010 ELECTROCARDIOGRAM REPORT: CPT | Mod: S$PBB,,, | Performed by: INTERNAL MEDICINE

## 2020-08-14 PROCEDURE — 85025 COMPLETE CBC W/AUTO DIFF WBC: CPT

## 2020-08-14 PROCEDURE — 80048 BASIC METABOLIC PNL TOTAL CA: CPT

## 2020-08-14 PROCEDURE — 36415 COLL VENOUS BLD VENIPUNCTURE: CPT | Mod: PO

## 2020-08-14 PROCEDURE — 93005 ELECTROCARDIOGRAM TRACING: CPT | Mod: PBBFAC,PO | Performed by: INTERNAL MEDICINE

## 2020-08-14 PROCEDURE — 93010 EKG 12-LEAD: ICD-10-PCS | Mod: S$PBB,,, | Performed by: INTERNAL MEDICINE

## 2020-08-14 PROCEDURE — 99214 OFFICE O/P EST MOD 30 MIN: CPT | Mod: 25,S$PBB,, | Performed by: FAMILY MEDICINE

## 2020-08-14 PROCEDURE — 99213 OFFICE O/P EST LOW 20 MIN: CPT | Mod: PBBFAC,PO,25 | Performed by: FAMILY MEDICINE

## 2020-08-14 RX ORDER — TADALAFIL 20 MG/1
TABLET ORAL
COMMUNITY
Start: 2020-06-05 | End: 2021-04-26 | Stop reason: SDUPTHER

## 2020-08-14 NOTE — PROGRESS NOTES
CHIEF COMPLAINT:  This is a 70-year-old male complaining near syncope.    SUBJECTIVE:  Patient reports rosemary today while finishing his lunch at a restaurant with a friend he started feeling faint.  When he did not respond to his friend, the EMTs were called.  The patient never totally lost consciousness.  His vital signs and rhythm strip were taken.  These were acceptable and patient was given IV fluids in the restaurant.  He was told he may have been dehydrated and to follow-up with PCP.  Patient reports that he was not outdoors in the hot sun prior to episode but was on his computer at home.  His wife who accompanies him today reports that he exercises excessively.  Patient denies headache, vertigo, chest pain, shortness of breath or palpitations.  He has a history of hyperlipidemia and prostate cancer.  He takes simvastatin 20 mg daily.  Other medications such as Zyrtec or Cialis are taken as needed, neither of which were taken today.  He has normal blood pressure with today's reading of 100/68.  He currently feels well and denies lightheadedness.    ROS:  GENERAL: Patient denies fever, chills, night sweats.  Patient denies weight gain or loss. Patient denies anorexia, fatigue, weakness or swollen glands.  SKIN: Patient denies rash.  HEENT: Patient denies sore throat, ear pain, hearing loss, nasal congestion, or runny nose. Patient denies visual disturbance, eye irritation or discharge.  LUNGS: Patient denies cough, wheeze or hemoptysis.  CARDIOVASCULAR: Patient denies chest pain, shortness of breath, palpitations, syncope or lower extremity edema.  Positive for near syncope.  GI: Patient denies abdominal pain, nausea, vomiting, diarrhea, constipation, blood in stool or melena.  GENITOURINARY:  Patient denies dysuria, frequency, hematuria, nocturia, urgency or incontinence.  MUSCULOSKELETAL: Patient denies joint pain, swelling, redness or warmth.  NEUROLOGIC: Patient denies headache, vertigo, paresthesias, weakness  in limb, dysarthria, dysphagia or abnormality of gait.    OBJECTIVE:   GENERAL: Well-developed well-nourished pleasant black male alert and oriented x3 in no acute distress.  Memory, judgment and cognition without deficit.  Accompanied by spouse.  SKIN: Clear without rash.  Normal color and tone.  HEENT: Eyes: Clear conjunctivae.  No scleral icterus.  Ears: Clear canals.  Clear TMs.  Nose: Without congestion.  Pharynx: Without injection or exudates.  NECK: Supple, normal range of motion.  No lymphadenopathy.  No masses or enlarged thyroid.  No JVD.  Carotids 2+ and equal.  No bruits.  LUNGS: Clear to auscultation.  Normal respiratory effort.  CARDIOVASCULAR: Regular rhythm, normal S1, S2 without murmur, gallop or rub.  BACK: No CVA or spinal tenderness.  ABDOMEN: Normal appearance.  Active bowel sounds.  Soft, nontender without mass or organomegaly.  No rebound or guarding.  EXTREMITIES: Without cyanosis, clubbing or edema.  Distal pulses 2+ and equal.  Normal range of motion in all extremities.  No joint effusion, erythema or warmth.  NEUROLOGIC:   Cranial nerves II through XII without deficit.  Motor strength equal bilaterally.  Sensation normal to touch.  Deep tendon reflexes 2+ and equal.  Gait without abnormality.  No tremor.  Negative Romberg.    EKG:  Normal sinus rhythm.  Nonspecific ST and T-wave abnormality.    ASSESSMENT:  1. Near syncope    2. Hypercholesteremia    3. Prostate cancer        PLAN:   1.  Keep well hydrated.  2.  Avoid prolonged activity in heat.  3.  Eat regular meals.  4.  Follow up with recurrent symptoms.      This note is generated with speech recognition software and is subject to transcription error and sound alike phrases that may be missed by proofreading.

## 2020-08-20 ENCOUNTER — OFFICE VISIT (OUTPATIENT)
Dept: FAMILY MEDICINE | Facility: CLINIC | Age: 70
End: 2020-08-20
Payer: MEDICARE

## 2020-08-20 VITALS
HEART RATE: 83 BPM | OXYGEN SATURATION: 97 % | DIASTOLIC BLOOD PRESSURE: 72 MMHG | SYSTOLIC BLOOD PRESSURE: 116 MMHG | HEIGHT: 73 IN | TEMPERATURE: 98 F | WEIGHT: 190.25 LBS | BODY MASS INDEX: 25.22 KG/M2

## 2020-08-20 DIAGNOSIS — R55 PRE-SYNCOPE: Primary | ICD-10-CM

## 2020-08-20 PROCEDURE — 99999 PR PBB SHADOW E&M-EST. PATIENT-LVL III: CPT | Mod: PBBFAC,,, | Performed by: INTERNAL MEDICINE

## 2020-08-20 PROCEDURE — 99214 PR OFFICE/OUTPT VISIT, EST, LEVL IV, 30-39 MIN: ICD-10-PCS | Mod: S$PBB,,, | Performed by: INTERNAL MEDICINE

## 2020-08-20 PROCEDURE — 99213 OFFICE O/P EST LOW 20 MIN: CPT | Mod: PBBFAC,PO | Performed by: INTERNAL MEDICINE

## 2020-08-20 PROCEDURE — 99999 PR PBB SHADOW E&M-EST. PATIENT-LVL III: ICD-10-PCS | Mod: PBBFAC,,, | Performed by: INTERNAL MEDICINE

## 2020-08-20 PROCEDURE — 99214 OFFICE O/P EST MOD 30 MIN: CPT | Mod: S$PBB,,, | Performed by: INTERNAL MEDICINE

## 2020-08-20 NOTE — PROGRESS NOTES
Subjective:       Patient ID: Darnell Ruelas Jr. is a 70 y.o. male.    Chief Complaint: Hypotension    Episode pre syncope -------no recurrence----------no associated symptoms ----------no symptoms today-----------he does exercise a lot---skips meals.            Past Medical History:   Diagnosis Date    Chronic hepatitis C     Hypercholesteremia     Kidney stones     Malignant neoplasm of prostate 2/14/2013     Past Surgical History:   Procedure Laterality Date    CHOLECYSTECTOMY      TONSILLECTOMY       No family history on file.  Social History     Socioeconomic History    Marital status:      Spouse name: Not on file    Number of children: Not on file    Years of education: Not on file    Highest education level: Not on file   Occupational History    Not on file   Social Needs    Financial resource strain: Not on file    Food insecurity     Worry: Not on file     Inability: Not on file    Transportation needs     Medical: Not on file     Non-medical: Not on file   Tobacco Use    Smoking status: Never Smoker    Smokeless tobacco: Former User   Substance and Sexual Activity    Alcohol use: Yes     Alcohol/week: 0.0 standard drinks     Comment: rarely    Drug use: No    Sexual activity: Yes   Lifestyle    Physical activity     Days per week: Not on file     Minutes per session: Not on file    Stress: Not on file   Relationships    Social connections     Talks on phone: Not on file     Gets together: Not on file     Attends Druze service: Not on file     Active member of club or organization: Not on file     Attends meetings of clubs or organizations: Not on file     Relationship status: Not on file   Other Topics Concern    Not on file   Social History Narrative    Not on file     Review of Systems   Constitutional: Negative for activity change, appetite change, chills, diaphoresis, fatigue, fever and unexpected weight change.   HENT: Negative for drooling, ear discharge, ear pain,  facial swelling, hearing loss, mouth sores, nosebleeds, postnasal drip, rhinorrhea, sinus pressure, sneezing, sore throat, tinnitus, trouble swallowing and voice change.    Eyes: Negative for photophobia, redness and visual disturbance.   Respiratory: Negative for apnea, cough, choking, chest tightness, shortness of breath and wheezing.    Cardiovascular: Negative for chest pain, palpitations and leg swelling.   Gastrointestinal: Negative for abdominal distention, abdominal pain, anal bleeding, blood in stool, constipation, diarrhea, nausea, rectal pain and vomiting.   Endocrine: Negative for cold intolerance, heat intolerance, polydipsia, polyphagia and polyuria.   Genitourinary: Negative for difficulty urinating, dysuria, enuresis, flank pain, frequency, genital sores, hematuria and urgency.   Musculoskeletal: Negative for arthralgias, back pain, gait problem, joint swelling, myalgias, neck pain and neck stiffness.   Skin: Negative for color change, pallor, rash and wound.   Allergic/Immunologic: Negative for food allergies and immunocompromised state.   Neurological: Negative for dizziness, tremors, seizures, syncope, facial asymmetry, speech difficulty, weakness, light-headedness, numbness and headaches.   Hematological: Negative for adenopathy. Does not bruise/bleed easily.   Psychiatric/Behavioral: Negative for agitation, behavioral problems, confusion, decreased concentration, dysphoric mood, hallucinations, self-injury, sleep disturbance and suicidal ideas. The patient is not nervous/anxious and is not hyperactive.        Objective:      Physical Exam  Vitals signs and nursing note reviewed.   Constitutional:       General: He is not in acute distress.     Appearance: He is well-developed. He is not diaphoretic.   HENT:      Head: Normocephalic and atraumatic.      Mouth/Throat:      Pharynx: No oropharyngeal exudate.   Eyes:      General: No scleral icterus.     Pupils: Pupils are equal, round, and reactive  to light.   Neck:      Musculoskeletal: Normal range of motion and neck supple.      Thyroid: No thyromegaly.      Vascular: No carotid bruit or JVD.      Trachea: No tracheal deviation.   Cardiovascular:      Rate and Rhythm: Normal rate and regular rhythm.      Heart sounds: Normal heart sounds.   Pulmonary:      Effort: Pulmonary effort is normal. No respiratory distress.      Breath sounds: Normal breath sounds. No wheezing or rales.   Chest:      Chest wall: No tenderness.   Abdominal:      General: Bowel sounds are normal. There is no distension.      Palpations: Abdomen is soft.      Tenderness: There is no abdominal tenderness. There is no guarding or rebound.   Musculoskeletal: Normal range of motion.         General: No tenderness.   Lymphadenopathy:      Cervical: No cervical adenopathy.   Skin:     General: Skin is warm and dry.      Coloration: Skin is not pale.      Findings: No erythema or rash.   Neurological:      Mental Status: He is alert and oriented to person, place, and time.      Cranial Nerves: No cranial nerve deficit.      Coordination: Coordination normal.   Psychiatric:         Behavior: Behavior normal.         Thought Content: Thought content normal.         Judgment: Judgment normal.         CMP  Sodium   Date Value Ref Range Status   08/14/2020 144 136 - 145 mmol/L Final     Potassium   Date Value Ref Range Status   08/14/2020 4.4 3.5 - 5.1 mmol/L Final     Chloride   Date Value Ref Range Status   08/14/2020 109 95 - 110 mmol/L Final     CO2   Date Value Ref Range Status   08/14/2020 27 23 - 29 mmol/L Final     Glucose   Date Value Ref Range Status   08/14/2020 85 70 - 110 mg/dL Final     BUN, Bld   Date Value Ref Range Status   08/14/2020 13 8 - 23 mg/dL Final     Creatinine   Date Value Ref Range Status   08/14/2020 1.2 0.5 - 1.4 mg/dL Final     Calcium   Date Value Ref Range Status   08/14/2020 9.0 8.7 - 10.5 mg/dL Final     Total Protein   Date Value Ref Range Status   07/08/2020  7.8 6.0 - 8.4 g/dL Final     Albumin   Date Value Ref Range Status   07/08/2020 4.0 3.5 - 5.2 g/dL Final     Total Bilirubin   Date Value Ref Range Status   07/08/2020 0.4 0.1 - 1.0 mg/dL Final     Comment:     For infants and newborns, interpretation of results should be based  on gestational age, weight and in agreement with clinical  observations.  Premature Infant recommended reference ranges:  Up to 24 hours.............<8.0 mg/dL  Up to 48 hours............<12.0 mg/dL  3-5 days..................<15.0 mg/dL  6-29 days.................<15.0 mg/dL       Alkaline Phosphatase   Date Value Ref Range Status   07/08/2020 85 55 - 135 U/L Final     AST   Date Value Ref Range Status   07/08/2020 20 10 - 40 U/L Final     ALT   Date Value Ref Range Status   07/08/2020 14 10 - 44 U/L Final     Anion Gap   Date Value Ref Range Status   08/14/2020 8 8 - 16 mmol/L Final     eGFR if    Date Value Ref Range Status   08/14/2020 >60.0 >60 mL/min/1.73 m^2 Final     eGFR if non    Date Value Ref Range Status   08/14/2020 >60.0 >60 mL/min/1.73 m^2 Final     Comment:     Calculation used to obtain the estimated glomerular filtration  rate (eGFR) is the CKD-EPI equation.        Lab Results   Component Value Date    WBC 9.98 08/14/2020    HGB 16.7 08/14/2020    HCT 52.6 08/14/2020    MCV 90 08/14/2020     08/14/2020     Lab Results   Component Value Date    CHOL 236 (H) 07/08/2020     Lab Results   Component Value Date    HDL 47 07/08/2020     Lab Results   Component Value Date    LDLCALC 147.0 07/08/2020     Lab Results   Component Value Date    TRIG 210 (H) 07/08/2020     Lab Results   Component Value Date    CHOLHDL 19.9 (L) 07/08/2020     Lab Results   Component Value Date    TSH 0.977 07/09/2019     No results found for: LABA1C, HGBA1C  Assessment:       1. Pre-syncope        Plan:   Pre-syncope-------------------no recurrence----------decrease activity in heat------eat, drink  fluids.---------------call for recurrence-----------

## 2021-01-11 ENCOUNTER — OFFICE VISIT (OUTPATIENT)
Dept: FAMILY MEDICINE | Facility: CLINIC | Age: 71
End: 2021-01-11
Payer: MEDICARE

## 2021-01-11 VITALS
WEIGHT: 195.19 LBS | TEMPERATURE: 97 F | HEIGHT: 73 IN | SYSTOLIC BLOOD PRESSURE: 126 MMHG | OXYGEN SATURATION: 97 % | DIASTOLIC BLOOD PRESSURE: 88 MMHG | HEART RATE: 71 BPM | BODY MASS INDEX: 25.87 KG/M2

## 2021-01-11 DIAGNOSIS — E78.00 HYPERCHOLESTEREMIA: Primary | ICD-10-CM

## 2021-01-11 DIAGNOSIS — M17.0 BILATERAL PRIMARY OSTEOARTHRITIS OF KNEE: ICD-10-CM

## 2021-01-11 DIAGNOSIS — Z85.46 HISTORY OF PROSTATE CANCER: ICD-10-CM

## 2021-01-11 PROCEDURE — 99214 OFFICE O/P EST MOD 30 MIN: CPT | Mod: S$PBB,,, | Performed by: INTERNAL MEDICINE

## 2021-01-11 PROCEDURE — 99999 PR PBB SHADOW E&M-EST. PATIENT-LVL III: ICD-10-PCS | Mod: PBBFAC,,, | Performed by: INTERNAL MEDICINE

## 2021-01-11 PROCEDURE — 99999 PR PBB SHADOW E&M-EST. PATIENT-LVL III: CPT | Mod: PBBFAC,,, | Performed by: INTERNAL MEDICINE

## 2021-01-11 PROCEDURE — 99213 OFFICE O/P EST LOW 20 MIN: CPT | Mod: PBBFAC,PO | Performed by: INTERNAL MEDICINE

## 2021-01-11 PROCEDURE — 99214 PR OFFICE/OUTPT VISIT, EST, LEVL IV, 30-39 MIN: ICD-10-PCS | Mod: S$PBB,,, | Performed by: INTERNAL MEDICINE

## 2021-01-11 RX ORDER — MELOXICAM 15 MG/1
15 TABLET ORAL DAILY PRN
Qty: 30 TABLET | Refills: 3 | Status: SHIPPED | OUTPATIENT
Start: 2021-01-11 | End: 2021-06-06

## 2021-04-21 ENCOUNTER — PES CALL (OUTPATIENT)
Dept: ADMINISTRATIVE | Facility: CLINIC | Age: 71
End: 2021-04-21

## 2021-04-22 ENCOUNTER — PES CALL (OUTPATIENT)
Dept: ADMINISTRATIVE | Facility: CLINIC | Age: 71
End: 2021-04-22

## 2021-04-26 ENCOUNTER — OFFICE VISIT (OUTPATIENT)
Dept: FAMILY MEDICINE | Facility: CLINIC | Age: 71
End: 2021-04-26
Payer: MEDICARE

## 2021-04-26 VITALS
OXYGEN SATURATION: 99 % | BODY MASS INDEX: 25.88 KG/M2 | DIASTOLIC BLOOD PRESSURE: 70 MMHG | HEIGHT: 73 IN | RESPIRATION RATE: 20 BRPM | HEART RATE: 70 BPM | WEIGHT: 195.31 LBS | SYSTOLIC BLOOD PRESSURE: 110 MMHG | TEMPERATURE: 98 F

## 2021-04-26 DIAGNOSIS — E78.00 HYPERCHOLESTEREMIA: ICD-10-CM

## 2021-04-26 DIAGNOSIS — M17.0 BILATERAL PRIMARY OSTEOARTHRITIS OF KNEE: ICD-10-CM

## 2021-04-26 DIAGNOSIS — N52.9 ERECTILE DYSFUNCTION, UNSPECIFIED ERECTILE DYSFUNCTION TYPE: ICD-10-CM

## 2021-04-26 DIAGNOSIS — B18.2 CHRONIC HEPATITIS C WITHOUT HEPATIC COMA: ICD-10-CM

## 2021-04-26 DIAGNOSIS — Z85.46 HISTORY OF PROSTATE CANCER: ICD-10-CM

## 2021-04-26 DIAGNOSIS — Z00.00 ENCOUNTER FOR PREVENTIVE HEALTH EXAMINATION: Primary | ICD-10-CM

## 2021-04-26 PROBLEM — C61 PROSTATE CANCER: Status: RESOLVED | Noted: 2018-08-24 | Resolved: 2021-04-26

## 2021-04-26 PROCEDURE — 99999 PR PBB SHADOW E&M-EST. PATIENT-LVL IV: CPT | Mod: PBBFAC,,, | Performed by: NURSE PRACTITIONER

## 2021-04-26 PROCEDURE — 99999 PR PBB SHADOW E&M-EST. PATIENT-LVL IV: ICD-10-PCS | Mod: PBBFAC,,, | Performed by: NURSE PRACTITIONER

## 2021-04-26 PROCEDURE — G0439 PPPS, SUBSEQ VISIT: HCPCS | Mod: ,,, | Performed by: NURSE PRACTITIONER

## 2021-04-26 PROCEDURE — 99214 OFFICE O/P EST MOD 30 MIN: CPT | Mod: PBBFAC,PO | Performed by: NURSE PRACTITIONER

## 2021-04-26 PROCEDURE — G0439 PR MEDICARE ANNUAL WELLNESS SUBSEQUENT VISIT: ICD-10-PCS | Mod: ,,, | Performed by: NURSE PRACTITIONER

## 2021-04-26 RX ORDER — TADALAFIL 20 MG/1
20 TABLET ORAL DAILY PRN
Qty: 30 TABLET | Refills: 3 | Status: SHIPPED | OUTPATIENT
Start: 2021-04-26 | End: 2022-06-01

## 2021-04-26 RX ORDER — TADALAFIL 20 MG/1
20 TABLET ORAL DAILY PRN
Qty: 30 TABLET | Refills: 3 | Status: SHIPPED | OUTPATIENT
Start: 2021-04-26 | End: 2021-04-26 | Stop reason: SDUPTHER

## 2021-07-13 ENCOUNTER — OFFICE VISIT (OUTPATIENT)
Dept: FAMILY MEDICINE | Facility: CLINIC | Age: 71
End: 2021-07-13
Payer: MEDICARE

## 2021-07-13 VITALS
HEART RATE: 61 BPM | BODY MASS INDEX: 25.39 KG/M2 | SYSTOLIC BLOOD PRESSURE: 130 MMHG | RESPIRATION RATE: 16 BRPM | DIASTOLIC BLOOD PRESSURE: 80 MMHG | OXYGEN SATURATION: 98 % | WEIGHT: 192.44 LBS | TEMPERATURE: 99 F

## 2021-07-13 DIAGNOSIS — Z85.46 HISTORY OF PROSTATE CANCER: Primary | ICD-10-CM

## 2021-07-13 DIAGNOSIS — E78.00 HYPERCHOLESTEREMIA: ICD-10-CM

## 2021-07-13 PROCEDURE — 99214 PR OFFICE/OUTPT VISIT, EST, LEVL IV, 30-39 MIN: ICD-10-PCS | Mod: S$PBB,,, | Performed by: INTERNAL MEDICINE

## 2021-07-13 PROCEDURE — 99214 OFFICE O/P EST MOD 30 MIN: CPT | Mod: S$PBB,,, | Performed by: INTERNAL MEDICINE

## 2021-07-13 PROCEDURE — 99999 PR PBB SHADOW E&M-EST. PATIENT-LVL III: CPT | Mod: PBBFAC,,, | Performed by: INTERNAL MEDICINE

## 2021-07-13 PROCEDURE — 99213 OFFICE O/P EST LOW 20 MIN: CPT | Mod: PBBFAC,PO | Performed by: INTERNAL MEDICINE

## 2021-07-13 PROCEDURE — 99999 PR PBB SHADOW E&M-EST. PATIENT-LVL III: ICD-10-PCS | Mod: PBBFAC,,, | Performed by: INTERNAL MEDICINE

## 2021-08-13 ENCOUNTER — LAB VISIT (OUTPATIENT)
Dept: LAB | Facility: HOSPITAL | Age: 71
End: 2021-08-13
Attending: INTERNAL MEDICINE
Payer: MEDICARE

## 2021-08-13 DIAGNOSIS — E78.00 HYPERCHOLESTEREMIA: ICD-10-CM

## 2021-08-13 DIAGNOSIS — Z85.46 HISTORY OF PROSTATE CANCER: ICD-10-CM

## 2021-08-13 LAB
ALBUMIN SERPL BCP-MCNC: 3.9 G/DL (ref 3.5–5.2)
ALP SERPL-CCNC: 68 U/L (ref 55–135)
ALT SERPL W/O P-5'-P-CCNC: 14 U/L (ref 10–44)
ANION GAP SERPL CALC-SCNC: 11 MMOL/L (ref 8–16)
AST SERPL-CCNC: 21 U/L (ref 10–40)
BASOPHILS # BLD AUTO: 0.03 K/UL (ref 0–0.2)
BASOPHILS NFR BLD: 0.5 % (ref 0–1.9)
BILIRUB SERPL-MCNC: 0.4 MG/DL (ref 0.1–1)
BUN SERPL-MCNC: 13 MG/DL (ref 8–23)
CALCIUM SERPL-MCNC: 8.9 MG/DL (ref 8.7–10.5)
CHLORIDE SERPL-SCNC: 106 MMOL/L (ref 95–110)
CHOLEST SERPL-MCNC: 146 MG/DL (ref 120–199)
CHOLEST/HDLC SERPL: 3.1 {RATIO} (ref 2–5)
CO2 SERPL-SCNC: 25 MMOL/L (ref 23–29)
COMPLEXED PSA SERPL-MCNC: <0.01 NG/ML (ref 0–4)
CREAT SERPL-MCNC: 1.3 MG/DL (ref 0.5–1.4)
DIFFERENTIAL METHOD: ABNORMAL
EOSINOPHIL # BLD AUTO: 0.1 K/UL (ref 0–0.5)
EOSINOPHIL NFR BLD: 2.1 % (ref 0–8)
ERYTHROCYTE [DISTWIDTH] IN BLOOD BY AUTOMATED COUNT: 14 % (ref 11.5–14.5)
EST. GFR  (AFRICAN AMERICAN): >60 ML/MIN/1.73 M^2
EST. GFR  (NON AFRICAN AMERICAN): 54.1 ML/MIN/1.73 M^2
GLUCOSE SERPL-MCNC: 92 MG/DL (ref 70–110)
HCT VFR BLD AUTO: 46.8 % (ref 40–54)
HDLC SERPL-MCNC: 47 MG/DL (ref 40–75)
HDLC SERPL: 32.2 % (ref 20–50)
HGB BLD-MCNC: 15.4 G/DL (ref 14–18)
IMM GRANULOCYTES # BLD AUTO: 0.01 K/UL (ref 0–0.04)
IMM GRANULOCYTES NFR BLD AUTO: 0.2 % (ref 0–0.5)
LDLC SERPL CALC-MCNC: 87.2 MG/DL (ref 63–159)
LYMPHOCYTES # BLD AUTO: 3.3 K/UL (ref 1–4.8)
LYMPHOCYTES NFR BLD: 52.6 % (ref 18–48)
MCH RBC QN AUTO: 28.6 PG (ref 27–31)
MCHC RBC AUTO-ENTMCNC: 32.9 G/DL (ref 32–36)
MCV RBC AUTO: 87 FL (ref 82–98)
MONOCYTES # BLD AUTO: 0.5 K/UL (ref 0.3–1)
MONOCYTES NFR BLD: 7.2 % (ref 4–15)
NEUTROPHILS # BLD AUTO: 2.3 K/UL (ref 1.8–7.7)
NEUTROPHILS NFR BLD: 37.4 % (ref 38–73)
NONHDLC SERPL-MCNC: 99 MG/DL
NRBC BLD-RTO: 0 /100 WBC
PLATELET # BLD AUTO: 220 K/UL (ref 150–450)
PMV BLD AUTO: 11.1 FL (ref 9.2–12.9)
POTASSIUM SERPL-SCNC: 4.2 MMOL/L (ref 3.5–5.1)
PROT SERPL-MCNC: 7.2 G/DL (ref 6–8.4)
RBC # BLD AUTO: 5.39 M/UL (ref 4.6–6.2)
SODIUM SERPL-SCNC: 142 MMOL/L (ref 136–145)
TRIGL SERPL-MCNC: 59 MG/DL (ref 30–150)
TSH SERPL DL<=0.005 MIU/L-ACNC: 1.22 UIU/ML (ref 0.4–4)
WBC # BLD AUTO: 6.22 K/UL (ref 3.9–12.7)

## 2021-08-13 PROCEDURE — 85025 COMPLETE CBC W/AUTO DIFF WBC: CPT | Performed by: INTERNAL MEDICINE

## 2021-08-13 PROCEDURE — 80053 COMPREHEN METABOLIC PANEL: CPT | Performed by: INTERNAL MEDICINE

## 2021-08-13 PROCEDURE — 84153 ASSAY OF PSA TOTAL: CPT | Performed by: INTERNAL MEDICINE

## 2021-08-13 PROCEDURE — 84443 ASSAY THYROID STIM HORMONE: CPT | Performed by: INTERNAL MEDICINE

## 2021-08-13 PROCEDURE — 80061 LIPID PANEL: CPT | Performed by: INTERNAL MEDICINE

## 2021-08-13 PROCEDURE — 36415 COLL VENOUS BLD VENIPUNCTURE: CPT | Mod: PO | Performed by: INTERNAL MEDICINE

## 2021-09-01 ENCOUNTER — PATIENT MESSAGE (OUTPATIENT)
Dept: ADMINISTRATIVE | Facility: CLINIC | Age: 71
End: 2021-09-01

## 2021-09-01 ENCOUNTER — TELEPHONE (OUTPATIENT)
Dept: INTERNAL MEDICINE | Facility: CLINIC | Age: 71
End: 2021-09-01

## 2021-09-01 ENCOUNTER — LAB VISIT (OUTPATIENT)
Dept: LAB | Facility: HOSPITAL | Age: 71
End: 2021-09-01
Attending: INTERNAL MEDICINE
Payer: MEDICARE

## 2021-09-01 ENCOUNTER — OFFICE VISIT (OUTPATIENT)
Dept: INTERNAL MEDICINE | Facility: CLINIC | Age: 71
End: 2021-09-01
Payer: MEDICARE

## 2021-09-01 VITALS
HEART RATE: 89 BPM | DIASTOLIC BLOOD PRESSURE: 76 MMHG | SYSTOLIC BLOOD PRESSURE: 106 MMHG | HEIGHT: 73 IN | WEIGHT: 184.06 LBS | BODY MASS INDEX: 24.39 KG/M2 | TEMPERATURE: 99 F | OXYGEN SATURATION: 97 %

## 2021-09-01 DIAGNOSIS — R11.0 NAUSEA: ICD-10-CM

## 2021-09-01 DIAGNOSIS — R53.83 FATIGUE, UNSPECIFIED TYPE: Primary | ICD-10-CM

## 2021-09-01 DIAGNOSIS — R53.83 FATIGUE, UNSPECIFIED TYPE: ICD-10-CM

## 2021-09-01 DIAGNOSIS — U07.1 COVID-19: ICD-10-CM

## 2021-09-01 LAB
ALBUMIN SERPL BCP-MCNC: 3.8 G/DL (ref 3.5–5.2)
ALP SERPL-CCNC: 64 U/L (ref 55–135)
ALT SERPL W/O P-5'-P-CCNC: 33 U/L (ref 10–44)
ANION GAP SERPL CALC-SCNC: 8 MMOL/L (ref 8–16)
AST SERPL-CCNC: 45 U/L (ref 10–40)
BASOPHILS # BLD AUTO: 0.02 K/UL (ref 0–0.2)
BASOPHILS NFR BLD: 0.3 % (ref 0–1.9)
BILIRUB SERPL-MCNC: 0.6 MG/DL (ref 0.1–1)
BUN SERPL-MCNC: 16 MG/DL (ref 8–23)
CALCIUM SERPL-MCNC: 8.8 MG/DL (ref 8.7–10.5)
CHLORIDE SERPL-SCNC: 106 MMOL/L (ref 95–110)
CO2 SERPL-SCNC: 27 MMOL/L (ref 23–29)
CREAT SERPL-MCNC: 1.4 MG/DL (ref 0.5–1.4)
CTP QC/QA: YES
DIFFERENTIAL METHOD: NORMAL
EOSINOPHIL # BLD AUTO: 0 K/UL (ref 0–0.5)
EOSINOPHIL NFR BLD: 0.3 % (ref 0–8)
ERYTHROCYTE [DISTWIDTH] IN BLOOD BY AUTOMATED COUNT: 13.5 % (ref 11.5–14.5)
EST. GFR  (AFRICAN AMERICAN): 57 ML/MIN/1.73 M^2
EST. GFR  (NON AFRICAN AMERICAN): 49 ML/MIN/1.73 M^2
GLUCOSE SERPL-MCNC: 103 MG/DL (ref 70–110)
HCT VFR BLD AUTO: 48.6 % (ref 40–54)
HGB BLD-MCNC: 16 G/DL (ref 14–18)
IMM GRANULOCYTES # BLD AUTO: 0.03 K/UL (ref 0–0.04)
IMM GRANULOCYTES NFR BLD AUTO: 0.4 % (ref 0–0.5)
LYMPHOCYTES # BLD AUTO: 1.8 K/UL (ref 1–4.8)
LYMPHOCYTES NFR BLD: 26.2 % (ref 18–48)
MCH RBC QN AUTO: 28.6 PG (ref 27–31)
MCHC RBC AUTO-ENTMCNC: 32.9 G/DL (ref 32–36)
MCV RBC AUTO: 87 FL (ref 82–98)
MONOCYTES # BLD AUTO: 0.5 K/UL (ref 0.3–1)
MONOCYTES NFR BLD: 6.7 % (ref 4–15)
NEUTROPHILS # BLD AUTO: 4.5 K/UL (ref 1.8–7.7)
NEUTROPHILS NFR BLD: 66.1 % (ref 38–73)
NRBC BLD-RTO: 0 /100 WBC
PLATELET # BLD AUTO: 181 K/UL (ref 150–450)
PMV BLD AUTO: 10.3 FL (ref 9.2–12.9)
POTASSIUM SERPL-SCNC: 4.3 MMOL/L (ref 3.5–5.1)
PROT SERPL-MCNC: 7.8 G/DL (ref 6–8.4)
RBC # BLD AUTO: 5.59 M/UL (ref 4.6–6.2)
SARS-COV-2 RDRP RESP QL NAA+PROBE: POSITIVE
SODIUM SERPL-SCNC: 141 MMOL/L (ref 136–145)
WBC # BLD AUTO: 6.84 K/UL (ref 3.9–12.7)

## 2021-09-01 PROCEDURE — 99214 OFFICE O/P EST MOD 30 MIN: CPT | Mod: S$PBB,CR,, | Performed by: NURSE PRACTITIONER

## 2021-09-01 PROCEDURE — 99999 PR PBB SHADOW E&M-EST. PATIENT-LVL IV: ICD-10-PCS | Mod: PBBFAC,CR,, | Performed by: NURSE PRACTITIONER

## 2021-09-01 PROCEDURE — 36415 COLL VENOUS BLD VENIPUNCTURE: CPT | Performed by: NURSE PRACTITIONER

## 2021-09-01 PROCEDURE — 80053 COMPREHEN METABOLIC PANEL: CPT | Performed by: NURSE PRACTITIONER

## 2021-09-01 PROCEDURE — 99214 PR OFFICE/OUTPT VISIT, EST, LEVL IV, 30-39 MIN: ICD-10-PCS | Mod: S$PBB,CR,, | Performed by: NURSE PRACTITIONER

## 2021-09-01 PROCEDURE — U0002 COVID-19 LAB TEST NON-CDC: HCPCS | Mod: PBBFAC | Performed by: NURSE PRACTITIONER

## 2021-09-01 PROCEDURE — 99999 PR PBB SHADOW E&M-EST. PATIENT-LVL IV: CPT | Mod: PBBFAC,CR,, | Performed by: NURSE PRACTITIONER

## 2021-09-01 PROCEDURE — 99214 OFFICE O/P EST MOD 30 MIN: CPT | Mod: PBBFAC | Performed by: NURSE PRACTITIONER

## 2021-09-01 PROCEDURE — 85025 COMPLETE CBC W/AUTO DIFF WBC: CPT | Performed by: NURSE PRACTITIONER

## 2021-09-02 ENCOUNTER — NURSE TRIAGE (OUTPATIENT)
Dept: ADMINISTRATIVE | Facility: CLINIC | Age: 71
End: 2021-09-02

## 2021-09-03 ENCOUNTER — INFUSION (OUTPATIENT)
Dept: INFECTIOUS DISEASES | Facility: HOSPITAL | Age: 71
End: 2021-09-03
Attending: NURSE PRACTITIONER
Payer: MEDICARE

## 2021-09-03 VITALS
RESPIRATION RATE: 20 BRPM | DIASTOLIC BLOOD PRESSURE: 88 MMHG | OXYGEN SATURATION: 96 % | HEART RATE: 57 BPM | TEMPERATURE: 98 F | SYSTOLIC BLOOD PRESSURE: 144 MMHG

## 2021-09-03 DIAGNOSIS — U07.1 COVID-19: Primary | ICD-10-CM

## 2021-09-03 PROCEDURE — 25000003 PHARM REV CODE 250: Performed by: INTERNAL MEDICINE

## 2021-09-03 PROCEDURE — M0243 CASIRIVI AND IMDEVI INFUSION: HCPCS | Performed by: INTERNAL MEDICINE

## 2021-09-03 PROCEDURE — 63600175 PHARM REV CODE 636 W HCPCS: Performed by: INTERNAL MEDICINE

## 2021-09-03 RX ORDER — ACETAMINOPHEN 325 MG/1
650 TABLET ORAL ONCE AS NEEDED
Status: DISCONTINUED | OUTPATIENT
Start: 2021-09-03 | End: 2023-10-18

## 2021-09-03 RX ORDER — EPINEPHRINE 0.3 MG/.3ML
0.3 INJECTION SUBCUTANEOUS
Status: DISCONTINUED | OUTPATIENT
Start: 2021-09-03 | End: 2023-10-18

## 2021-09-03 RX ORDER — DIPHENHYDRAMINE HYDROCHLORIDE 50 MG/ML
25 INJECTION INTRAMUSCULAR; INTRAVENOUS ONCE AS NEEDED
Status: DISCONTINUED | OUTPATIENT
Start: 2021-09-03 | End: 2023-10-18

## 2021-09-03 RX ORDER — ONDANSETRON 4 MG/1
4 TABLET, ORALLY DISINTEGRATING ORAL ONCE AS NEEDED
Status: DISCONTINUED | OUTPATIENT
Start: 2021-09-03 | End: 2023-10-18

## 2021-09-03 RX ORDER — SODIUM CHLORIDE 0.9 % (FLUSH) 0.9 %
10 SYRINGE (ML) INJECTION
Status: DISCONTINUED | OUTPATIENT
Start: 2021-09-03 | End: 2023-10-18

## 2021-09-03 RX ORDER — ALBUTEROL SULFATE 90 UG/1
2 AEROSOL, METERED RESPIRATORY (INHALATION)
Status: DISCONTINUED | OUTPATIENT
Start: 2021-09-03 | End: 2023-10-18

## 2021-09-03 RX ADMIN — CASIRIVIMAB AND IMDEVIMAB 600 MG: 600; 600 INJECTION, SOLUTION, CONCENTRATE INTRAVENOUS at 09:09

## 2021-09-06 ENCOUNTER — NURSE TRIAGE (OUTPATIENT)
Dept: ADMINISTRATIVE | Facility: CLINIC | Age: 71
End: 2021-09-06

## 2021-09-15 ENCOUNTER — OFFICE VISIT (OUTPATIENT)
Dept: FAMILY MEDICINE | Facility: CLINIC | Age: 71
End: 2021-09-15
Payer: MEDICARE

## 2021-09-15 DIAGNOSIS — U07.1 COVID-19 VIRUS INFECTION: Primary | ICD-10-CM

## 2021-09-15 PROCEDURE — 99441 PR PHYSICIAN TELEPHONE EVALUATION 5-10 MIN: ICD-10-PCS | Mod: CR,95,, | Performed by: INTERNAL MEDICINE

## 2021-09-15 PROCEDURE — 99441 PR PHYSICIAN TELEPHONE EVALUATION 5-10 MIN: CPT | Mod: CR,95,, | Performed by: INTERNAL MEDICINE

## 2021-11-02 ENCOUNTER — TELEPHONE (OUTPATIENT)
Dept: FAMILY MEDICINE | Facility: CLINIC | Age: 71
End: 2021-11-02
Payer: MEDICARE

## 2021-11-09 ENCOUNTER — OFFICE VISIT (OUTPATIENT)
Dept: FAMILY MEDICINE | Facility: CLINIC | Age: 71
End: 2021-11-09
Payer: MEDICARE

## 2021-11-09 ENCOUNTER — LAB VISIT (OUTPATIENT)
Dept: LAB | Facility: HOSPITAL | Age: 71
End: 2021-11-09
Attending: INTERNAL MEDICINE
Payer: MEDICARE

## 2021-11-09 VITALS
WEIGHT: 196.13 LBS | TEMPERATURE: 98 F | OXYGEN SATURATION: 97 % | SYSTOLIC BLOOD PRESSURE: 120 MMHG | BODY MASS INDEX: 25.99 KG/M2 | DIASTOLIC BLOOD PRESSURE: 90 MMHG | HEART RATE: 81 BPM | RESPIRATION RATE: 18 BRPM | HEIGHT: 73 IN

## 2021-11-09 DIAGNOSIS — T67.5XXA HEAT EXHAUSTION, INITIAL ENCOUNTER: Primary | ICD-10-CM

## 2021-11-09 DIAGNOSIS — R42 DIZZINESS: ICD-10-CM

## 2021-11-09 DIAGNOSIS — N18.31 CHRONIC KIDNEY DISEASE, STAGE 3A: ICD-10-CM

## 2021-11-09 DIAGNOSIS — T67.5XXA HEAT EXHAUSTION, INITIAL ENCOUNTER: ICD-10-CM

## 2021-11-09 PROBLEM — M75.41 IMPINGEMENT SYNDROME OF RIGHT SHOULDER: Status: RESOLVED | Noted: 2017-09-27 | Resolved: 2021-11-09

## 2021-11-09 PROBLEM — M77.8 RIGHT SHOULDER TENDINITIS: Status: RESOLVED | Noted: 2017-10-27 | Resolved: 2021-11-09

## 2021-11-09 PROBLEM — M25.511 ACUTE PAIN OF RIGHT SHOULDER: Status: RESOLVED | Noted: 2017-09-27 | Resolved: 2021-11-09

## 2021-11-09 PROCEDURE — 99214 OFFICE O/P EST MOD 30 MIN: CPT | Mod: PBBFAC,PO | Performed by: REGISTERED NURSE

## 2021-11-09 PROCEDURE — 99213 PR OFFICE/OUTPT VISIT, EST, LEVL III, 20-29 MIN: ICD-10-PCS | Mod: S$PBB,,, | Performed by: REGISTERED NURSE

## 2021-11-09 PROCEDURE — 99213 OFFICE O/P EST LOW 20 MIN: CPT | Mod: S$PBB,,, | Performed by: REGISTERED NURSE

## 2021-11-09 PROCEDURE — 80053 COMPREHEN METABOLIC PANEL: CPT | Performed by: REGISTERED NURSE

## 2021-11-09 PROCEDURE — 99999 PR PBB SHADOW E&M-EST. PATIENT-LVL IV: CPT | Mod: PBBFAC,,, | Performed by: REGISTERED NURSE

## 2021-11-09 PROCEDURE — 36415 COLL VENOUS BLD VENIPUNCTURE: CPT | Mod: PO | Performed by: REGISTERED NURSE

## 2021-11-09 PROCEDURE — 99999 PR PBB SHADOW E&M-EST. PATIENT-LVL IV: ICD-10-PCS | Mod: PBBFAC,,, | Performed by: REGISTERED NURSE

## 2021-11-10 LAB
ALBUMIN SERPL BCP-MCNC: 3.9 G/DL (ref 3.5–5.2)
ALP SERPL-CCNC: 68 U/L (ref 55–135)
ALT SERPL W/O P-5'-P-CCNC: 19 U/L (ref 10–44)
ANION GAP SERPL CALC-SCNC: 8 MMOL/L (ref 8–16)
AST SERPL-CCNC: 25 U/L (ref 10–40)
BILIRUB SERPL-MCNC: 0.4 MG/DL (ref 0.1–1)
BUN SERPL-MCNC: 13 MG/DL (ref 8–23)
CALCIUM SERPL-MCNC: 9.4 MG/DL (ref 8.7–10.5)
CHLORIDE SERPL-SCNC: 107 MMOL/L (ref 95–110)
CO2 SERPL-SCNC: 26 MMOL/L (ref 23–29)
CREAT SERPL-MCNC: 1 MG/DL (ref 0.5–1.4)
EST. GFR  (AFRICAN AMERICAN): >60 ML/MIN/1.73 M^2
EST. GFR  (NON AFRICAN AMERICAN): >60 ML/MIN/1.73 M^2
GLUCOSE SERPL-MCNC: 88 MG/DL (ref 70–110)
POTASSIUM SERPL-SCNC: 4 MMOL/L (ref 3.5–5.1)
PROT SERPL-MCNC: 7.2 G/DL (ref 6–8.4)
SODIUM SERPL-SCNC: 141 MMOL/L (ref 136–145)

## 2022-01-13 ENCOUNTER — OFFICE VISIT (OUTPATIENT)
Dept: FAMILY MEDICINE | Facility: CLINIC | Age: 72
End: 2022-01-13
Payer: MEDICARE

## 2022-01-13 VITALS
DIASTOLIC BLOOD PRESSURE: 80 MMHG | OXYGEN SATURATION: 97 % | TEMPERATURE: 98 F | HEART RATE: 76 BPM | WEIGHT: 195.13 LBS | BODY MASS INDEX: 25.74 KG/M2 | SYSTOLIC BLOOD PRESSURE: 122 MMHG | RESPIRATION RATE: 16 BRPM

## 2022-01-13 DIAGNOSIS — E78.00 HYPERCHOLESTEREMIA: ICD-10-CM

## 2022-01-13 DIAGNOSIS — Z85.46 HISTORY OF PROSTATE CANCER: Primary | ICD-10-CM

## 2022-01-13 PROCEDURE — 99214 OFFICE O/P EST MOD 30 MIN: CPT | Mod: PBBFAC,PO | Performed by: INTERNAL MEDICINE

## 2022-01-13 PROCEDURE — 99213 PR OFFICE/OUTPT VISIT, EST, LEVL III, 20-29 MIN: ICD-10-PCS | Mod: S$PBB,,, | Performed by: INTERNAL MEDICINE

## 2022-01-13 PROCEDURE — 99999 PR PBB SHADOW E&M-EST. PATIENT-LVL IV: ICD-10-PCS | Mod: PBBFAC,,, | Performed by: INTERNAL MEDICINE

## 2022-01-13 PROCEDURE — 99999 PR PBB SHADOW E&M-EST. PATIENT-LVL IV: CPT | Mod: PBBFAC,,, | Performed by: INTERNAL MEDICINE

## 2022-01-13 PROCEDURE — 99213 OFFICE O/P EST LOW 20 MIN: CPT | Mod: S$PBB,,, | Performed by: INTERNAL MEDICINE

## 2022-01-13 NOTE — PROGRESS NOTES
Subjective:       Patient ID: Darnell Ruelas Jr. is a 71 y.o. male.    Chief Complaint: Follow-up (6m) and Hyperlipidemia    Follow-up  Pertinent negatives include no abdominal pain, arthralgias, chest pain, chills, coughing, diaphoresis, fatigue, fever, headaches, joint swelling, myalgias, nausea, neck pain, numbness, rash, sore throat, vomiting or weakness.   Hyperlipidemia  Pertinent negatives include no chest pain, myalgias or shortness of breath.     Past Medical History:   Diagnosis Date    Chronic hepatitis C     Hypercholesteremia     Kidney stones     Malignant neoplasm of prostate 2/14/2013     Past Surgical History:   Procedure Laterality Date    CATARACT EXTRACTION EXTRACAPSULAR W/ INTRAOCULAR LENS IMPLANTATION Bilateral     CHOLECYSTECTOMY      KIDNEY STONE SURGERY  1988    TONSILLECTOMY       No family history on file.  Social History     Socioeconomic History    Marital status:    Tobacco Use    Smoking status: Never Smoker    Smokeless tobacco: Former User   Substance and Sexual Activity    Alcohol use: Yes     Alcohol/week: 0.0 standard drinks     Comment: rarely    Drug use: No    Sexual activity: Yes     Review of Systems   Constitutional: Negative for activity change, appetite change, chills, diaphoresis, fatigue, fever and unexpected weight change.   HENT: Negative for drooling, ear discharge, ear pain, facial swelling, hearing loss, mouth sores, nosebleeds, postnasal drip, rhinorrhea, sinus pressure, sneezing, sore throat, tinnitus, trouble swallowing and voice change.    Eyes: Negative for photophobia, redness and visual disturbance.   Respiratory: Negative for apnea, cough, choking, chest tightness, shortness of breath and wheezing.    Cardiovascular: Negative for chest pain, palpitations and leg swelling.   Gastrointestinal: Negative for abdominal distention, abdominal pain, anal bleeding, blood in stool, constipation, diarrhea, nausea, rectal pain and vomiting.    Endocrine: Negative for cold intolerance, heat intolerance, polydipsia, polyphagia and polyuria.   Genitourinary: Negative for difficulty urinating, dysuria, enuresis, flank pain, frequency, genital sores, hematuria and urgency.   Musculoskeletal: Negative for arthralgias, back pain, gait problem, joint swelling, myalgias, neck pain and neck stiffness.   Skin: Negative for color change, pallor, rash and wound.   Allergic/Immunologic: Negative for food allergies and immunocompromised state.   Neurological: Negative for dizziness, tremors, seizures, syncope, facial asymmetry, speech difficulty, weakness, light-headedness, numbness and headaches.   Hematological: Negative for adenopathy. Does not bruise/bleed easily.   Psychiatric/Behavioral: Negative for agitation, behavioral problems, confusion, decreased concentration, dysphoric mood, hallucinations, self-injury, sleep disturbance and suicidal ideas. The patient is not nervous/anxious and is not hyperactive.        Objective:      Physical Exam  Vitals and nursing note reviewed.   Constitutional:       General: He is not in acute distress.     Appearance: Normal appearance. He is well-developed and well-nourished. He is not diaphoretic.   HENT:      Head: Normocephalic and atraumatic.      Mouth/Throat:      Pharynx: No oropharyngeal exudate.   Eyes:      General: No scleral icterus.     Pupils: Pupils are equal, round, and reactive to light.   Neck:      Thyroid: No thyromegaly.      Vascular: No carotid bruit or JVD.      Trachea: No tracheal deviation.   Cardiovascular:      Rate and Rhythm: Normal rate and regular rhythm.      Pulses: Intact distal pulses.      Heart sounds: Normal heart sounds.   Pulmonary:      Effort: Pulmonary effort is normal. No respiratory distress.      Breath sounds: Normal breath sounds. No wheezing or rales.   Chest:      Chest wall: No tenderness.   Abdominal:      General: Bowel sounds are normal. There is no distension.       Palpations: Abdomen is soft.      Tenderness: There is no abdominal tenderness. There is no guarding or rebound.   Musculoskeletal:         General: No tenderness or edema. Normal range of motion.      Cervical back: Normal range of motion and neck supple.   Lymphadenopathy:      Cervical: No cervical adenopathy.   Skin:     General: Skin is warm and dry.      Coloration: Skin is not pale.      Findings: No erythema or rash.   Neurological:      Mental Status: He is alert and oriented to person, place, and time.   Psychiatric:         Mood and Affect: Mood and affect normal.         Behavior: Behavior normal.         Thought Content: Thought content normal.         Judgment: Judgment normal.         CMP  Sodium   Date Value Ref Range Status   11/09/2021 141 136 - 145 mmol/L Final     Potassium   Date Value Ref Range Status   11/09/2021 4.0 3.5 - 5.1 mmol/L Final     Chloride   Date Value Ref Range Status   11/09/2021 107 95 - 110 mmol/L Final     CO2   Date Value Ref Range Status   11/09/2021 26 23 - 29 mmol/L Final     Glucose   Date Value Ref Range Status   11/09/2021 88 70 - 110 mg/dL Final     BUN   Date Value Ref Range Status   11/09/2021 13 8 - 23 mg/dL Final     Creatinine   Date Value Ref Range Status   11/09/2021 1.0 0.5 - 1.4 mg/dL Final     Calcium   Date Value Ref Range Status   11/09/2021 9.4 8.7 - 10.5 mg/dL Final     Total Protein   Date Value Ref Range Status   11/09/2021 7.2 6.0 - 8.4 g/dL Final     Albumin   Date Value Ref Range Status   11/09/2021 3.9 3.5 - 5.2 g/dL Final     Total Bilirubin   Date Value Ref Range Status   11/09/2021 0.4 0.1 - 1.0 mg/dL Final     Comment:     For infants and newborns, interpretation of results should be based  on gestational age, weight and in agreement with clinical  observations.    Premature Infant recommended reference ranges:  Up to 24 hours.............<8.0 mg/dL  Up to 48 hours............<12.0 mg/dL  3-5 days..................<15.0 mg/dL  6-29  days.................<15.0 mg/dL       Alkaline Phosphatase   Date Value Ref Range Status   11/09/2021 68 55 - 135 U/L Final     AST   Date Value Ref Range Status   11/09/2021 25 10 - 40 U/L Final     ALT   Date Value Ref Range Status   11/09/2021 19 10 - 44 U/L Final     Anion Gap   Date Value Ref Range Status   11/09/2021 8 8 - 16 mmol/L Final     eGFR if    Date Value Ref Range Status   11/09/2021 >60.0 >60 mL/min/1.73 m^2 Final     eGFR if non    Date Value Ref Range Status   11/09/2021 >60.0 >60 mL/min/1.73 m^2 Final     Comment:     Calculation used to obtain the estimated glomerular filtration  rate (eGFR) is the CKD-EPI equation.        Lab Results   Component Value Date    WBC 6.84 09/01/2021    HGB 16.0 09/01/2021    HCT 48.6 09/01/2021    MCV 87 09/01/2021     09/01/2021     Lab Results   Component Value Date    CHOL 146 08/13/2021     Lab Results   Component Value Date    HDL 47 08/13/2021     Lab Results   Component Value Date    LDLCALC 87.2 08/13/2021     Lab Results   Component Value Date    TRIG 59 08/13/2021     Lab Results   Component Value Date    CHOLHDL 32.2 08/13/2021     Lab Results   Component Value Date    TSH 1.222 08/13/2021     No results found for: LABA1C, HGBA1C  Assessment:       1. History of prostate cancer    2. Hypercholesteremia        Plan:   History of prostate cancer    Hypercholesteremia    Stable---------continue meds.          F/u 6 months--------------

## 2022-05-17 ENCOUNTER — PES CALL (OUTPATIENT)
Dept: ADMINISTRATIVE | Facility: CLINIC | Age: 72
End: 2022-05-17
Payer: MEDICARE

## 2022-06-01 RX ORDER — TADALAFIL 20 MG/1
TABLET ORAL
Qty: 90 TABLET | Refills: 2 | Status: SHIPPED | OUTPATIENT
Start: 2022-06-01 | End: 2023-08-16

## 2022-06-01 NOTE — TELEPHONE ENCOUNTER
Care Due:                  Date            Visit Type   Department     Provider  --------------------------------------------------------------------------------                                EP -                              PRIMARY      [facility]  Last Visit: 01-      CARE ([facility])   MEDICINE       Beto Godfrey                              EP -                              PRIMARY      [facility]  Next Visit: 07-      CARE ([facility])   LIZ Godfrey                                                            Last  Test          Frequency    Reason                     Performed    Due Date  --------------------------------------------------------------------------------    Lipid Panel.  12 months..  simvastatin..............  08- 08-    Health [facility] Embedded Care Gaps. Reference number: [id number]. 6/01/2022   2:11:05 AM CDT

## 2022-06-01 NOTE — TELEPHONE ENCOUNTER
Refill Routing Note   Medication(s) are not appropriate for processing by [facility] for the following reason(s):      Drug-Disease: tadalafiL and Chronic hepatitis C    [facility] action(s):  Defer Medication-related problems identified:   Requires labs  Drug-disease interaction     Medication Therapy Plan: Drug-Disease: tadalafiL and Chronic hepatitis; Defer  Medication reconciliation completed: No     Appointments  past 12m or future 3m with PCP    Date Provider   Last Visit   1/13/2022 Beto Godfrey MD   Next Visit   7/13/2022 Beto Godfrey MD   ED visits in past 90 days: 0        Note composed:1:31 PM 06/01/2022

## 2022-06-03 ENCOUNTER — TELEPHONE (OUTPATIENT)
Dept: ADMINISTRATIVE | Facility: HOSPITAL | Age: 72
End: 2022-06-03
Payer: MEDICARE

## 2022-06-06 ENCOUNTER — OFFICE VISIT (OUTPATIENT)
Dept: FAMILY MEDICINE | Facility: CLINIC | Age: 72
End: 2022-06-06
Payer: MEDICARE

## 2022-06-06 VITALS
HEART RATE: 68 BPM | SYSTOLIC BLOOD PRESSURE: 142 MMHG | TEMPERATURE: 99 F | DIASTOLIC BLOOD PRESSURE: 93 MMHG | RESPIRATION RATE: 20 BRPM | BODY MASS INDEX: 25.62 KG/M2 | OXYGEN SATURATION: 98 % | WEIGHT: 193.31 LBS | HEIGHT: 73 IN

## 2022-06-06 DIAGNOSIS — Z00.00 ENCOUNTER FOR PREVENTIVE HEALTH EXAMINATION: Primary | ICD-10-CM

## 2022-06-06 DIAGNOSIS — N18.31 CHRONIC KIDNEY DISEASE, STAGE 3A: ICD-10-CM

## 2022-06-06 DIAGNOSIS — E78.00 HYPERCHOLESTEREMIA: ICD-10-CM

## 2022-06-06 DIAGNOSIS — Z85.46 HISTORY OF PROSTATE CANCER: ICD-10-CM

## 2022-06-06 DIAGNOSIS — B18.2 CHRONIC HEPATITIS C WITHOUT HEPATIC COMA: ICD-10-CM

## 2022-06-06 DIAGNOSIS — H90.3 SENSORINEURAL HEARING LOSS (SNHL) OF BOTH EARS: ICD-10-CM

## 2022-06-06 DIAGNOSIS — J30.9 ALLERGIC RHINITIS, UNSPECIFIED SEASONALITY, UNSPECIFIED TRIGGER: ICD-10-CM

## 2022-06-06 DIAGNOSIS — R03.0 ELEVATED BLOOD PRESSURE READING: ICD-10-CM

## 2022-06-06 PROCEDURE — G0439 PPPS, SUBSEQ VISIT: HCPCS | Mod: ,,, | Performed by: NURSE PRACTITIONER

## 2022-06-06 PROCEDURE — 99999 PR PBB SHADOW E&M-EST. PATIENT-LVL V: ICD-10-PCS | Mod: PBBFAC,,, | Performed by: NURSE PRACTITIONER

## 2022-06-06 PROCEDURE — 99215 OFFICE O/P EST HI 40 MIN: CPT | Mod: PBBFAC,PO | Performed by: NURSE PRACTITIONER

## 2022-06-06 PROCEDURE — 99999 PR PBB SHADOW E&M-EST. PATIENT-LVL V: CPT | Mod: PBBFAC,,, | Performed by: NURSE PRACTITIONER

## 2022-06-06 PROCEDURE — G0439 PR MEDICARE ANNUAL WELLNESS SUBSEQUENT VISIT: ICD-10-PCS | Mod: ,,, | Performed by: NURSE PRACTITIONER

## 2022-06-06 NOTE — PATIENT INSTRUCTIONS
Counseling and Referral of Other Preventative  (Italic type indicates deductible and co-insurance are waived)    Patient Name: Darnell Ruelas  Today's Date: 6/6/2022    Health Maintenance       Date Due Completion Date    TETANUS VACCINE Never done ---    Shingles Vaccine (1 of 2) Never done ---    Pneumococcal Vaccines (Age 65+) (1 - PCV) Never done ---    COVID-19 Vaccine (2 - Booster for Christine series) 05/27/2021 4/1/2021    Lipid Panel 08/13/2022 8/13/2021    Influenza Vaccine (Season Ended) 09/01/2022 10/17/2012    Colorectal Cancer Screening 05/31/2023 5/31/2013        No orders of the defined types were placed in this encounter.    The following information is provided to all patients.  This information is to help you find resources for any of the problems found today that may be affecting your health:                Living healthy guide: www.[facility].[place].[facility]      Understanding Diabetes: www.diabetes.org      Eating healthy: www.cdc.gov/healthyweight      [facility] home safety checklist: www.cdc.gov/steadi/patient.html      Agency on Aging: www.goea.[place]      Alcoholics anonymous (AA): www.aa.org      Physical Activity: www.solomon.nih.gov/rw8ckkn      Tobacco use: www.quitwithusla.org     Counseling and Referral of Other Preventative  (Italic type indicates deductible and co-insurance are waived)    Patient Name: Darnell Ruelas  Today's Date: 6/6/2022    Health Maintenance       Date Due Completion Date    TETANUS VACCINE Never done ---    Shingles Vaccine (1 of 2) Never done ---    Pneumococcal Vaccines (Age 65+) (1 - PCV) Never done ---    COVID-19 Vaccine (2 - Booster for Christine series) 05/27/2021 4/1/2021    Lipid Panel 08/13/2022 8/13/2021    Influenza Vaccine (Season Ended) 09/01/2022 10/17/2012    Colorectal Cancer Screening 05/31/2023 5/31/2013        No orders of the defined types were placed in this encounter.    The following information is provided to all patients.  This information is to help  you find resources for any of the problems found today that may be affecting your health:                Living healthy guide: www.[facility].[place].[facility]      Understanding Diabetes: www.diabetes.org      Eating healthy: www.cdc.gov/healthyweight      CDC home safety checklist: www.cdc.gov/steadi/patient.html      Agency on Aging: www.goea.[place].[facility]      Alcoholics anonymous (AA): www.aa.org      Physical Activity: www.solomon.nih.gov/xd9efts      Tobacco use: www.quitwithusla.org

## 2022-06-06 NOTE — Clinical Note
Your patient was seen today for a HRA visit.  I have included a copy of my visit note, please review the note and feel free to contact me with any questions.  Thank you for allowing me to participate in the care of your patients.  Virgen Olivia NP

## 2022-06-06 NOTE — PROGRESS NOTES
"  Darnell Ruelas presented for a  Medicare AWV and comprehensive Health Risk Assessment today. The following components were reviewed and updated:    · Medical history  · Family History  · Social history  · Allergies and Current Medications  · Health Risk Assessment  · Health Maintenance  · Care Team         ** See Completed Assessments for Annual Wellness Visit within the encounter summary.**         The following assessments were completed:  · Living Situation  · CAGE  · Depression Screening  · Timed Get Up and Go  · Whisper Test  · Cognitive Function Screening  · Nutrition Screening  · ADL Screening  · PAQ Screening        Vitals:    06/06/22 1020 06/06/22 1039   BP: 138/89 (!) 142/93   Pulse: 68    Resp: 20    Temp: 98.8 °F (37.1 °C)    SpO2: 98%    Weight: 87.7 kg (193 lb 5.5 oz)    Height: 6' 1" (1.854 m)      Body mass index is 25.51 kg/m².  Physical Exam  Vitals and nursing note reviewed.   Constitutional:       General: He is not in acute distress.     Appearance: He is well-developed.   HENT:      Head: Normocephalic and atraumatic.   Eyes:      Pupils: Pupils are equal, round, and reactive to light.   Neck:      Vascular: No carotid bruit.   Cardiovascular:      Rate and Rhythm: Normal rate and regular rhythm.      Pulses: Normal pulses.      Heart sounds: Normal heart sounds. No murmur heard.    No gallop.   Pulmonary:      Effort: Pulmonary effort is normal.      Breath sounds: Normal breath sounds.   Abdominal:      General: Bowel sounds are normal. There is no distension.      Palpations: Abdomen is soft.      Tenderness: There is no abdominal tenderness.   Musculoskeletal:         General: Normal range of motion.   Skin:     General: Skin is warm and dry.   Neurological:      Motor: No abnormal muscle tone.      Gait: Gait normal.   Psychiatric:         Speech: Speech normal.         Behavior: Behavior normal.         Thought Content: Thought content normal.         Judgment: Judgment normal. "       Current Outpatient Medications   Medication Instructions    cetirizine (ZYRTEC) 10 MG tablet TAKE 1 TABLET DAILY    ERGOCALCIFEROL, VITAMIN D2, (VITAMIN D ORAL) 1 tablet, Oral, Daily    meloxicam (MOBIC) 15 MG tablet TAKE 1 TABLET DAILY AS NEEDED    multivitamin capsule 1 capsule, Daily    simvastatin (ZOCOR) 20 MG tablet TAKE 1 TABLET EVERY EVENING    tadalafiL (CIALIS) 20 MG Tab TAKE 1 TABLET DAILY AS NEEDED             Diagnoses and health risks identified today and associated recommendations/orders:    1. Encounter for preventive health examination    Decline vaccines     2. Chronic kidney disease, stage 3a  eGFR if African American >60 mL/min/1.73 m^2 57 Abnormal   >60.0  >60.0  >   Chronic and Stable. Continue current treatment plan as previously prescribed with your PCP    3. Chronic hepatitis C without hepatic coma  Stable and controlled.  States he completed treatment yrs go   Continue current treatment plan as previously prescribed with your PCP.    4. Hypercholesteremia  Chronic and Stable on Zocor. Continue current treatment plan as previously prescribed with your PCP    5. Sensorineural hearing loss (SNHL) of both ears    2018. Results reveal a mild-to-moderate sensorineural hearing loss 3535-1737 Hz for the right ear, and  moderate sensorineural hearing loss 2584-7997 Hz for the left ear.   Speech Reception Thresholds were  10 dBHL for the right ear and 10 dBHL for the left ear.      Passed whisper test- decline additional testing- follow up with PCP    6. Allergic rhinitis, unspecified seasonality, unspecified trigger  Chronic and Stable on Zyretec. Continue current treatment plan as previously prescribed with your PCP    7. History of prostate cancer   history of prostate cancer s/p RRP in 1996. Followed by outside urologist    8 Elevated blood pressure reading  /93- instructed to cirilo and record- follow up with PCP for recheck     I offered to discuss advanced care planning,  including how to pick a person who would make decisions for you if you were unable to make them for yourself, called a health care power of , and what kind of decisions you might make such as use of life sustaining treatments such as ventilators and tube feeding when faced with a life limiting illness recorded on a living will that they will need to know. (How you want to be cared for as you near the end of your natural life)     X Patient is interested in learning more about how to make advanced directives.  I provided them paperwork and offered to discuss this with them.       I offered to discuss end of life issues, including information on how to make advance directives that the patient could use to name someone who would make medical decisions on their behalf if they became too ill to make themselves.  _X_Patient is interested, I provided paper work and offered to discuss    Provided Darnell with a 5-10 year written screening schedule and personal prevention plan. Recommendations were developed using the USPSTF age appropriate recommendations. Education, counseling, and referrals were provided as needed. After Visit Summary printed and given to patient which includes a list of additional screenings\tests needed.    Follow up in about 1 year (around 6/6/2023) for Follow up with PCP.    Virgen Olivia NP

## 2022-07-13 ENCOUNTER — OFFICE VISIT (OUTPATIENT)
Dept: FAMILY MEDICINE | Facility: CLINIC | Age: 72
End: 2022-07-13
Payer: MEDICARE

## 2022-07-13 VITALS
SYSTOLIC BLOOD PRESSURE: 120 MMHG | OXYGEN SATURATION: 96 % | BODY MASS INDEX: 25.56 KG/M2 | HEIGHT: 73 IN | TEMPERATURE: 97 F | DIASTOLIC BLOOD PRESSURE: 80 MMHG | WEIGHT: 192.88 LBS | HEART RATE: 81 BPM

## 2022-07-13 DIAGNOSIS — Z85.46 HISTORY OF PROSTATE CANCER: ICD-10-CM

## 2022-07-13 DIAGNOSIS — E78.00 HYPERCHOLESTEREMIA: Primary | ICD-10-CM

## 2022-07-13 PROCEDURE — 99214 OFFICE O/P EST MOD 30 MIN: CPT | Mod: PBBFAC,PO | Performed by: INTERNAL MEDICINE

## 2022-07-13 PROCEDURE — 99214 OFFICE O/P EST MOD 30 MIN: CPT | Mod: S$PBB,,, | Performed by: INTERNAL MEDICINE

## 2022-07-13 PROCEDURE — 99999 PR PBB SHADOW E&M-EST. PATIENT-LVL IV: CPT | Mod: PBBFAC,,, | Performed by: INTERNAL MEDICINE

## 2022-07-13 PROCEDURE — 99214 PR OFFICE/OUTPT VISIT, EST, LEVL IV, 30-39 MIN: ICD-10-PCS | Mod: S$PBB,,, | Performed by: INTERNAL MEDICINE

## 2022-07-13 PROCEDURE — 99999 PR PBB SHADOW E&M-EST. PATIENT-LVL IV: ICD-10-PCS | Mod: PBBFAC,,, | Performed by: INTERNAL MEDICINE

## 2022-07-13 NOTE — PROGRESS NOTES
Subjective:       Patient ID: Darnell Ruelas Jr. is a 72 y.o. male.    Chief Complaint: Hyperlipidemia    Hyperlipidemia  Pertinent negatives include no chest pain, myalgias or shortness of breath.     Past Medical History:   Diagnosis Date    Chronic hepatitis C     Hypercholesteremia     Kidney stones     Malignant neoplasm of prostate 2/14/2013     Past Surgical History:   Procedure Laterality Date    CATARACT EXTRACTION EXTRACAPSULAR W/ INTRAOCULAR LENS IMPLANTATION Bilateral     CHOLECYSTECTOMY      KIDNEY STONE SURGERY  1988    TONSILLECTOMY       History reviewed. No pertinent family history.  Social History     Socioeconomic History    Marital status:    Tobacco Use    Smoking status: Never Smoker    Smokeless tobacco: Former User   Substance and Sexual Activity    Alcohol use: Yes     Alcohol/week: 0.0 standard drinks     Comment: rarely    Drug use: No    Sexual activity: Yes     Social Determinants of Health     Financial Resource Strain: Low Risk     Difficulty of Paying Living Expenses: Not hard at all   Food Insecurity: No Food Insecurity    Worried About Running Out of Food in the Last Year: Never true    Ran Out of Food in the Last Year: Never true   Transportation Needs: No Transportation Needs    Lack of Transportation (Medical): No    Lack of Transportation (Non-Medical): No   Physical Activity: Sufficiently Active    Days of Exercise per Week: 5 days    Minutes of Exercise per Session: 150+ min   Stress: No Stress Concern Present    Feeling of Stress : Not at all   Social Connections: Socially Integrated    Frequency of Communication with Friends and Family: More than three times a week    Frequency of Social Gatherings with Friends and Family: More than three times a week    Attends [employer] Services: More than 4 times per year    Attends Club or Organization Meetings: More than 4 times per year    Marital Status:    Housing Stability: Unknown    Unable  to Pay for Housing in the Last Year: No    Unstable Housing in the Last Year: No     Review of Systems   Constitutional: Negative for activity change, appetite change, chills, diaphoresis, fatigue, fever and unexpected weight change.   HENT: Negative for drooling, ear discharge, ear pain, facial swelling, hearing loss, mouth sores, nosebleeds, postnasal drip, rhinorrhea, sinus pressure, sneezing, sore throat, tinnitus, trouble swallowing and voice change.    Eyes: Negative for photophobia, redness and visual disturbance.   Respiratory: Negative for apnea, cough, choking, chest tightness, shortness of breath and wheezing.    Cardiovascular: Negative for chest pain, palpitations and leg swelling.   Gastrointestinal: Negative for abdominal distention, abdominal pain, anal bleeding, blood in stool, constipation, diarrhea, nausea, rectal pain and vomiting.   Endocrine: Negative for cold intolerance, heat intolerance, polydipsia, polyphagia and polyuria.   Genitourinary: Negative for difficulty urinating, dysuria, enuresis, flank pain, frequency, genital sores, hematuria and urgency.   Musculoskeletal: Negative for arthralgias, back pain, gait problem, joint swelling, myalgias, neck pain and neck stiffness.   Skin: Negative for color change, pallor, rash and wound.   Allergic/Immunologic: Negative for food allergies and immunocompromised state.   Neurological: Negative for dizziness, tremors, seizures, syncope, facial asymmetry, speech difficulty, weakness, light-headedness, numbness and headaches.   Hematological: Negative for adenopathy. Does not bruise/bleed easily.   Psychiatric/Behavioral: Negative for agitation, behavioral problems, confusion, decreased concentration, dysphoric mood, hallucinations, self-injury, sleep disturbance and suicidal ideas. The patient is not nervous/anxious and is not hyperactive.        Objective:      Physical Exam  Vitals and nursing note reviewed.   Constitutional:       General: He  is not in acute distress.     Appearance: Normal appearance. He is well-developed. He is not diaphoretic.   HENT:      Head: Normocephalic and atraumatic.      Mouth/Throat:      Pharynx: No oropharyngeal exudate.   Eyes:      General: No scleral icterus.     Pupils: Pupils are equal, round, and reactive to light.   Neck:      Thyroid: No thyromegaly.      Vascular: No carotid bruit or JVD.      Trachea: No tracheal deviation.   Cardiovascular:      Rate and Rhythm: Normal rate and regular rhythm.      Heart sounds: Normal heart sounds.   Pulmonary:      Effort: Pulmonary effort is normal. No respiratory distress.      Breath sounds: Normal breath sounds. No wheezing or rales.   Chest:      Chest wall: No tenderness.   Abdominal:      General: Bowel sounds are normal. There is no distension.      Palpations: Abdomen is soft.      Tenderness: There is no abdominal tenderness. There is no guarding or rebound.   Musculoskeletal:         General: No tenderness. Normal range of motion.      Cervical back: Normal range of motion and neck supple.   Lymphadenopathy:      Cervical: No cervical adenopathy.   Skin:     General: Skin is warm and dry.      Coloration: Skin is not pale.      Findings: No erythema or rash.   Neurological:      Mental Status: He is alert and oriented to person, place, and time.   Psychiatric:         Behavior: Behavior normal.         Thought Content: Thought content normal.         Judgment: Judgment normal.         CMP  Sodium   Date Value Ref Range Status   11/09/2021 141 136 - 145 mmol/L Final     Potassium   Date Value Ref Range Status   11/09/2021 4.0 3.5 - 5.1 mmol/L Final     Chloride   Date Value Ref Range Status   11/09/2021 107 95 - 110 mmol/L Final     CO2   Date Value Ref Range Status   11/09/2021 26 23 - 29 mmol/L Final     Glucose   Date Value Ref Range Status   11/09/2021 88 70 - 110 mg/dL Final     BUN   Date Value Ref Range Status   11/09/2021 13 8 - 23 mg/dL Final     Creatinine    Date Value Ref Range Status   11/09/2021 1.0 0.5 - 1.4 mg/dL Final     Calcium   Date Value Ref Range Status   11/09/2021 9.4 8.7 - 10.5 mg/dL Final     Total Protein   Date Value Ref Range Status   11/09/2021 7.2 6.0 - 8.4 g/dL Final     Albumin   Date Value Ref Range Status   11/09/2021 3.9 3.5 - 5.2 g/dL Final     Total Bilirubin   Date Value Ref Range Status   11/09/2021 0.4 0.1 - 1.0 mg/dL Final     Comment:     For infants and newborns, interpretation of results should be based  on gestational age, weight and in agreement with clinical  observations.    Premature Infant recommended reference ranges:  Up to 24 hours.............<8.0 mg/dL  Up to 48 hours............<12.0 mg/dL  3-5 days..................<15.0 mg/dL  6-29 days.................<15.0 mg/dL       Alkaline Phosphatase   Date Value Ref Range Status   11/09/2021 68 55 - 135 U/L Final     AST   Date Value Ref Range Status   11/09/2021 25 10 - 40 U/L Final     ALT   Date Value Ref Range Status   11/09/2021 19 10 - 44 U/L Final     Anion Gap   Date Value Ref Range Status   11/09/2021 8 8 - 16 mmol/L Final     eGFR if    Date Value Ref Range Status   11/09/2021 >60.0 >60 mL/min/1.73 m^2 Final     eGFR if non    Date Value Ref Range Status   11/09/2021 >60.0 >60 mL/min/1.73 m^2 Final     Comment:     Calculation used to obtain the estimated glomerular filtration  rate (eGFR) is the CKD-EPI equation.        Lab Results   Component Value Date    WBC 6.84 09/01/2021    HGB 16.0 09/01/2021    HCT 48.6 09/01/2021    MCV 87 09/01/2021     09/01/2021     Lab Results   Component Value Date    CHOL 146 08/13/2021     Lab Results   Component Value Date    HDL 47 08/13/2021     Lab Results   Component Value Date    LDLCALC 87.2 08/13/2021     Lab Results   Component Value Date    TRIG 59 08/13/2021     Lab Results   Component Value Date    CHOLHDL 32.2 08/13/2021     Lab Results   Component Value Date    TSH 1.222 08/13/2021      No results found for: LABA1C, HGBA1C  Assessment:       1. Hypercholesteremia    2. History of prostate cancer        Plan:   Hypercholesteremia  -     Comprehensive Metabolic Panel; Future; Expected date: 07/13/2022  -     CBC Auto Differential; Future; Expected date: 07/13/2022  -     Lipid Panel; Future; Expected date: 07/13/2022    History of prostate cancer  -     Prostate Specific Antigen, Diagnostic; Future; Expected date: 07/13/2022    Stable-------continue meds--------------as above--------------f/u 6 months----

## 2022-11-14 RX ORDER — MELOXICAM 15 MG/1
TABLET ORAL
Qty: 30 TABLET | Refills: 3 | Status: SHIPPED | OUTPATIENT
Start: 2022-11-14 | End: 2022-11-16 | Stop reason: SDUPTHER

## 2022-11-14 NOTE — TELEPHONE ENCOUNTER
----- Message from Laurence Christianson sent at 11/14/2022  2:57 PM CST -----  Contact: Patient, [phone]  Calling because Rx meloxicam (MOBIC) 15 MG tablet is not at the pharmacy. Please send it. Thanks.      [facility] #[id number] - [address] & [address] [address]  Phone: [phone] Fax: [phone]

## 2022-11-16 NOTE — TELEPHONE ENCOUNTER
----- Message from Tiki Joe sent at 11/16/2022  3:48 PM CST -----  .Type:  RX Refill Request    Who Called: .Darnell Ruelas Jr.   Refill or New Rx:refill  RX Name and Strength:meloxicam (MOBIC) 15 MG tablet  How is the patient currently taking it? (ex. 1XDay):daily  Is this a 30 day or 90 day RX:90    Preferred Pharmacy with phone number:.  [facility] #[id number] - [address] & [address] [address]  Phone: [phone] Fax: [phone]    Local or Mail Order:local  Ordering Provider:Dr. Godfrey  Would the patient rather a call back or a response via [facility]? Call back  Best Call Back Number:.[phone]   Additional Information: this is the pt 3rd time calling he stated

## 2022-11-17 RX ORDER — MELOXICAM 15 MG/1
TABLET ORAL
Qty: 30 TABLET | Refills: 3 | Status: SHIPPED | OUTPATIENT
Start: 2022-11-17 | End: 2022-11-18 | Stop reason: SDUPTHER

## 2022-11-18 RX ORDER — MELOXICAM 15 MG/1
TABLET ORAL
Qty: 30 TABLET | Refills: 3 | Status: SHIPPED | OUTPATIENT
Start: 2022-11-18 | End: 2022-11-28 | Stop reason: SDUPTHER

## 2022-11-28 NOTE — TELEPHONE ENCOUNTER
----- Message from Pj Miguel sent at 11/28/2022  4:14 PM CST -----  Contact: Darnell Patrick would like a call back at [phone] in regards to getting medication, meloxicam (MOBIC) 15 MG tablet refilled the pharmacy says they haven't received it and patient has been waiting 3 weeks for medication, it needs to be sent [facility]   [id number] [address] & [address] [address]  Phone: [phone] Fax: [phone]      Thanks   Am

## 2022-11-29 RX ORDER — MELOXICAM 15 MG/1
TABLET ORAL
Qty: 30 TABLET | Refills: 3 | Status: SHIPPED | OUTPATIENT
Start: 2022-11-29 | End: 2022-12-13 | Stop reason: SDUPTHER

## 2022-12-13 RX ORDER — MELOXICAM 15 MG/1
TABLET ORAL
Qty: 30 TABLET | Refills: 3 | Status: SHIPPED | OUTPATIENT
Start: 2022-12-13 | End: 2022-12-14 | Stop reason: SDUPTHER

## 2022-12-14 RX ORDER — MELOXICAM 15 MG/1
TABLET ORAL
Qty: 30 TABLET | Refills: 3 | Status: SHIPPED | OUTPATIENT
Start: 2022-12-14 | End: 2023-01-27 | Stop reason: SDUPTHER

## 2023-01-13 ENCOUNTER — OFFICE VISIT (OUTPATIENT)
Dept: FAMILY MEDICINE | Facility: CLINIC | Age: 73
End: 2023-01-13
Payer: MEDICARE

## 2023-01-13 ENCOUNTER — LAB VISIT (OUTPATIENT)
Dept: LAB | Facility: HOSPITAL | Age: 73
End: 2023-01-13
Attending: INTERNAL MEDICINE
Payer: MEDICARE

## 2023-01-13 VITALS
DIASTOLIC BLOOD PRESSURE: 80 MMHG | WEIGHT: 192 LBS | HEART RATE: 80 BPM | SYSTOLIC BLOOD PRESSURE: 122 MMHG | TEMPERATURE: 98 F | RESPIRATION RATE: 16 BRPM | BODY MASS INDEX: 25.33 KG/M2

## 2023-01-13 DIAGNOSIS — Z85.46 HISTORY OF PROSTATE CANCER: ICD-10-CM

## 2023-01-13 DIAGNOSIS — E78.00 HYPERCHOLESTEREMIA: Primary | ICD-10-CM

## 2023-01-13 DIAGNOSIS — D47.2 MGUS (MONOCLONAL GAMMOPATHY OF UNKNOWN SIGNIFICANCE): ICD-10-CM

## 2023-01-13 DIAGNOSIS — E78.00 HYPERCHOLESTEREMIA: ICD-10-CM

## 2023-01-13 DIAGNOSIS — Z12.11 COLON CANCER SCREENING: ICD-10-CM

## 2023-01-13 LAB
ALBUMIN SERPL BCP-MCNC: 4.1 G/DL (ref 3.5–5.2)
ALP SERPL-CCNC: 83 U/L (ref 55–135)
ALT SERPL W/O P-5'-P-CCNC: 16 U/L (ref 10–44)
ANION GAP SERPL CALC-SCNC: 5 MMOL/L (ref 8–16)
AST SERPL-CCNC: 24 U/L (ref 10–40)
BASOPHILS # BLD AUTO: 0.03 K/UL (ref 0–0.2)
BASOPHILS NFR BLD: 0.5 % (ref 0–1.9)
BILIRUB SERPL-MCNC: 0.4 MG/DL (ref 0.1–1)
BUN SERPL-MCNC: 14 MG/DL (ref 8–23)
CALCIUM SERPL-MCNC: 9.2 MG/DL (ref 8.7–10.5)
CHLORIDE SERPL-SCNC: 107 MMOL/L (ref 95–110)
CHOLEST SERPL-MCNC: 186 MG/DL (ref 120–199)
CHOLEST/HDLC SERPL: 3.6 {RATIO} (ref 2–5)
CO2 SERPL-SCNC: 28 MMOL/L (ref 23–29)
COMPLEXED PSA SERPL-MCNC: <0.01 NG/ML (ref 0–4)
CREAT SERPL-MCNC: 1.1 MG/DL (ref 0.5–1.4)
DIFFERENTIAL METHOD: ABNORMAL
EOSINOPHIL # BLD AUTO: 0.3 K/UL (ref 0–0.5)
EOSINOPHIL NFR BLD: 4.9 % (ref 0–8)
ERYTHROCYTE [DISTWIDTH] IN BLOOD BY AUTOMATED COUNT: 13.6 % (ref 11.5–14.5)
EST. GFR  (NO RACE VARIABLE): >60 ML/MIN/1.73 M^2
GLUCOSE SERPL-MCNC: 97 MG/DL (ref 70–110)
HCT VFR BLD AUTO: 50 % (ref 40–54)
HDLC SERPL-MCNC: 51 MG/DL (ref 40–75)
HDLC SERPL: 27.4 % (ref 20–50)
HGB BLD-MCNC: 15.6 G/DL (ref 14–18)
IMM GRANULOCYTES # BLD AUTO: 0.02 K/UL (ref 0–0.04)
IMM GRANULOCYTES NFR BLD AUTO: 0.3 % (ref 0–0.5)
LDLC SERPL CALC-MCNC: 121 MG/DL (ref 63–159)
LYMPHOCYTES # BLD AUTO: 3.2 K/UL (ref 1–4.8)
LYMPHOCYTES NFR BLD: 53.6 % (ref 18–48)
MCH RBC QN AUTO: 28.2 PG (ref 27–31)
MCHC RBC AUTO-ENTMCNC: 31.2 G/DL (ref 32–36)
MCV RBC AUTO: 90 FL (ref 82–98)
MONOCYTES # BLD AUTO: 0.4 K/UL (ref 0.3–1)
MONOCYTES NFR BLD: 6.2 % (ref 4–15)
NEUTROPHILS # BLD AUTO: 2.1 K/UL (ref 1.8–7.7)
NEUTROPHILS NFR BLD: 34.5 % (ref 38–73)
NONHDLC SERPL-MCNC: 135 MG/DL
NRBC BLD-RTO: 0 /100 WBC
PLATELET # BLD AUTO: 237 K/UL (ref 150–450)
PMV BLD AUTO: 11 FL (ref 9.2–12.9)
POTASSIUM SERPL-SCNC: 4.4 MMOL/L (ref 3.5–5.1)
PROT SERPL-MCNC: 7.4 G/DL (ref 6–8.4)
RBC # BLD AUTO: 5.53 M/UL (ref 4.6–6.2)
SODIUM SERPL-SCNC: 140 MMOL/L (ref 136–145)
TRIGL SERPL-MCNC: 70 MG/DL (ref 30–150)
WBC # BLD AUTO: 5.95 K/UL (ref 3.9–12.7)

## 2023-01-13 PROCEDURE — 36415 COLL VENOUS BLD VENIPUNCTURE: CPT | Mod: PO | Performed by: INTERNAL MEDICINE

## 2023-01-13 PROCEDURE — 99214 OFFICE O/P EST MOD 30 MIN: CPT | Mod: S$PBB,,, | Performed by: INTERNAL MEDICINE

## 2023-01-13 PROCEDURE — 99214 PR OFFICE/OUTPT VISIT, EST, LEVL IV, 30-39 MIN: ICD-10-PCS | Mod: S$PBB,,, | Performed by: INTERNAL MEDICINE

## 2023-01-13 PROCEDURE — 80053 COMPREHEN METABOLIC PANEL: CPT | Performed by: INTERNAL MEDICINE

## 2023-01-13 PROCEDURE — 99999 PR PBB SHADOW E&M-EST. PATIENT-LVL IV: CPT | Mod: PBBFAC,,, | Performed by: INTERNAL MEDICINE

## 2023-01-13 PROCEDURE — 80061 LIPID PANEL: CPT | Performed by: INTERNAL MEDICINE

## 2023-01-13 PROCEDURE — 84153 ASSAY OF PSA TOTAL: CPT | Performed by: INTERNAL MEDICINE

## 2023-01-13 PROCEDURE — 99999 PR PBB SHADOW E&M-EST. PATIENT-LVL IV: ICD-10-PCS | Mod: PBBFAC,,, | Performed by: INTERNAL MEDICINE

## 2023-01-13 PROCEDURE — 85025 COMPLETE CBC W/AUTO DIFF WBC: CPT | Performed by: INTERNAL MEDICINE

## 2023-01-13 PROCEDURE — 99214 OFFICE O/P EST MOD 30 MIN: CPT | Mod: PBBFAC,PO | Performed by: INTERNAL MEDICINE

## 2023-01-13 NOTE — PROGRESS NOTES
Subjective:       Patient ID: Darnell Ruelas Jr. is a 72 y.o. male.    Chief Complaint: Follow-up (6m) and Hyperlipidemia    Follow-up  Pertinent negatives include no abdominal pain, arthralgias, chest pain, chills, coughing, diaphoresis, fatigue, fever, headaches, joint swelling, myalgias, nausea, neck pain, numbness, rash, sore throat, vomiting or weakness.   Hyperlipidemia  Pertinent negatives include no chest pain, myalgias or shortness of breath.   Past Medical History:   Diagnosis Date    Chronic hepatitis C     Hypercholesteremia     Kidney stones     Malignant neoplasm of prostate 2/14/2013     Past Surgical History:   Procedure Laterality Date    CATARACT EXTRACTION EXTRACAPSULAR W/ INTRAOCULAR LENS IMPLANTATION Bilateral     CHOLECYSTECTOMY      KIDNEY STONE SURGERY  1988    TONSILLECTOMY       No family history on file.  Social History     Socioeconomic History    Marital status:    Tobacco Use    Smoking status: Former     Types: Cigarettes    Smokeless tobacco: Former   Substance and Sexual Activity    Alcohol use: Yes     Alcohol/week: 0.0 standard drinks     Comment: rarely    Drug use: No    Sexual activity: Yes     Social Determinants of Health     Financial Resource Strain: Low Risk     Difficulty of Paying Living Expenses: Not hard at all   Food Insecurity: No Food Insecurity    Worried About Running Out of Food in the Last Year: Never true    Ran Out of Food in the Last Year: Never true   Transportation Needs: No Transportation Needs    Lack of Transportation (Medical): No    Lack of Transportation (Non-Medical): No   Physical Activity: Sufficiently Active    Days of Exercise per Week: 5 days    Minutes of Exercise per Session: 150+ min   Stress: No Stress Concern Present    Feeling of Stress : Not at all   Social Connections: Socially Integrated    Frequency of Communication with Friends and Family: More than three times a week    Frequency of Social Gatherings with Friends and Family: More  than three times a week    Attends [employer] Services: More than 4 times per year    Attends Club or Organization Meetings: More than 4 times per year    Marital Status:    Housing Stability: Unknown    Unable to Pay for Housing in the Last Year: No    Unstable Housing in the Last Year: No     Review of Systems   Constitutional:  Negative for activity change, appetite change, chills, diaphoresis, fatigue, fever and unexpected weight change.   HENT:  Negative for drooling, ear discharge, ear pain, facial swelling, hearing loss, mouth sores, nosebleeds, postnasal drip, rhinorrhea, sinus pressure, sneezing, sore throat, tinnitus, trouble swallowing and voice change.    Eyes:  Negative for photophobia, redness and visual disturbance.   Respiratory:  Negative for apnea, cough, choking, chest tightness, shortness of breath and wheezing.    Cardiovascular:  Negative for chest pain, palpitations and leg swelling.   Gastrointestinal:  Negative for abdominal distention, abdominal pain, anal bleeding, blood in stool, constipation, diarrhea, nausea, rectal pain and vomiting.   Endocrine: Negative for cold intolerance, heat intolerance, polydipsia, polyphagia and polyuria.   Genitourinary:  Negative for difficulty urinating, dysuria, enuresis, flank pain, frequency, genital sores, hematuria and urgency.   Musculoskeletal:  Negative for arthralgias, back pain, gait problem, joint swelling, myalgias, neck pain and neck stiffness.   Skin:  Negative for color change, pallor, rash and wound.   Allergic/Immunologic: Negative for food allergies and immunocompromised state.   Neurological:  Negative for dizziness, tremors, seizures, syncope, facial asymmetry, speech difficulty, weakness, light-headedness, numbness and headaches.   Hematological:  Negative for adenopathy. Does not bruise/bleed easily.   Psychiatric/Behavioral:  Negative for agitation, behavioral problems, confusion, decreased concentration, dysphoric mood,  hallucinations, self-injury, sleep disturbance and suicidal ideas. The patient is not nervous/anxious and is not hyperactive.      Objective:      Physical Exam  Vitals and nursing note reviewed.   Constitutional:       General: He is not in acute distress.     Appearance: Normal appearance. He is well-developed. He is not diaphoretic.   HENT:      Head: Normocephalic and atraumatic.      Mouth/Throat:      Pharynx: No oropharyngeal exudate.   Eyes:      General: No scleral icterus.     Pupils: Pupils are equal, round, and reactive to light.   Neck:      Thyroid: No thyromegaly.      Vascular: No carotid bruit or JVD.      Trachea: No tracheal deviation.   Cardiovascular:      Rate and Rhythm: Normal rate and regular rhythm.      Heart sounds: Normal heart sounds.   Pulmonary:      Effort: Pulmonary effort is normal. No respiratory distress.      Breath sounds: Normal breath sounds. No wheezing or rales.   Chest:      Chest wall: No tenderness.   Abdominal:      General: Bowel sounds are normal. There is no distension.      Palpations: Abdomen is soft.      Tenderness: There is no abdominal tenderness. There is no guarding or rebound.   Musculoskeletal:         General: No tenderness. Normal range of motion.      Cervical back: Normal range of motion and neck supple.   Lymphadenopathy:      Cervical: No cervical adenopathy.   Skin:     General: Skin is warm and dry.      Coloration: Skin is not pale.      Findings: No erythema or rash.   Neurological:      Mental Status: He is alert and oriented to person, place, and time.   Psychiatric:         Behavior: Behavior normal.         Thought Content: Thought content normal.         Judgment: Judgment normal.       CMP  Sodium   Date Value Ref Range Status   11/09/2021 141 136 - 145 mmol/L Final     Potassium   Date Value Ref Range Status   11/09/2021 4.0 3.5 - 5.1 mmol/L Final     Chloride   Date Value Ref Range Status   11/09/2021 107 95 - 110 mmol/L Final     CO2    Date Value Ref Range Status   11/09/2021 26 23 - 29 mmol/L Final     Glucose   Date Value Ref Range Status   11/09/2021 88 70 - 110 mg/dL Final     BUN   Date Value Ref Range Status   11/09/2021 13 8 - 23 mg/dL Final     Creatinine   Date Value Ref Range Status   11/09/2021 1.0 0.5 - 1.4 mg/dL Final     Calcium   Date Value Ref Range Status   11/09/2021 9.4 8.7 - 10.5 mg/dL Final     Total Protein   Date Value Ref Range Status   11/09/2021 7.2 6.0 - 8.4 g/dL Final     Albumin   Date Value Ref Range Status   11/09/2021 3.9 3.5 - 5.2 g/dL Final     Total Bilirubin   Date Value Ref Range Status   11/09/2021 0.4 0.1 - 1.0 mg/dL Final     Comment:     For infants and newborns, interpretation of results should be based  on gestational age, weight and in agreement with clinical  observations.    Premature Infant recommended reference ranges:  Up to 24 hours.............<8.0 mg/dL  Up to 48 hours............<12.0 mg/dL  3-5 days..................<15.0 mg/dL  6-29 days.................<15.0 mg/dL       Alkaline Phosphatase   Date Value Ref Range Status   11/09/2021 68 55 - 135 U/L Final     AST   Date Value Ref Range Status   11/09/2021 25 10 - 40 U/L Final     ALT   Date Value Ref Range Status   11/09/2021 19 10 - 44 U/L Final     Anion Gap   Date Value Ref Range Status   11/09/2021 8 8 - 16 mmol/L Final     eGFR if    Date Value Ref Range Status   11/09/2021 >60.0 >60 mL/min/1.73 m^2 Final     eGFR if non    Date Value Ref Range Status   11/09/2021 >60.0 >60 mL/min/1.73 m^2 Final     Comment:     Calculation used to obtain the estimated glomerular filtration  rate (eGFR) is the CKD-EPI equation.        Lab Results   Component Value Date    WBC 6.84 09/01/2021    HGB 16.0 09/01/2021    HCT 48.6 09/01/2021    MCV 87 09/01/2021     09/01/2021     Lab Results   Component Value Date    CHOL 146 08/13/2021     Lab Results   Component Value Date    HDL 47 08/13/2021     Lab Results    Component Value Date    LDLCALC 87.2 08/13/2021     Lab Results   Component Value Date    TRIG 59 08/13/2021     Lab Results   Component Value Date    CHOLHDL 32.2 08/13/2021     Lab Results   Component Value Date    TSH 1.222 08/13/2021     No results found for: LABA1C, HGBA1C  Assessment:       1. Hypercholesteremia    2. History of prostate cancer    3. MGUS (monoclonal gammopathy of unknown significance)    4. Colon cancer screening          Plan:   Hypercholesteremia  -     Comprehensive Metabolic Panel; Future; Expected date: 01/13/2023  -     CBC Auto Differential; Future; Expected date: 01/13/2023  -     Lipid Panel; Future; Expected date: 01/13/2023    History of prostate cancer  -     Prostate Specific Antigen, Diagnostic; Future; Expected date: 01/13/2023    MGUS (monoclonal gammopathy of unknown significance)    Colon cancer screening  -     Ambulatory referral/consult to Endo Procedure ; Future; Expected date: 01/14/2023    Stable-----------------continue meds-------------as above--------------f/u 6 months-

## 2023-01-19 ENCOUNTER — HOSPITAL ENCOUNTER (OUTPATIENT)
Dept: PREADMISSION TESTING | Facility: HOSPITAL | Age: 73
Discharge: HOME | End: 2023-01-19
Attending: INTERNAL MEDICINE
Payer: MEDICARE

## 2023-01-19 DIAGNOSIS — Z12.11 COLON CANCER SCREENING: ICD-10-CM

## 2023-01-27 RX ORDER — MELOXICAM 15 MG/1
TABLET ORAL
Qty: 30 TABLET | Refills: 3 | Status: SHIPPED | OUTPATIENT
Start: 2023-01-27 | End: 2023-10-19

## 2023-05-14 ENCOUNTER — TELEPHONE (OUTPATIENT)
Dept: ADMINISTRATIVE | Facility: HOSPITAL | Age: 73
End: 2023-05-14
Payer: MEDICARE

## 2023-05-30 ENCOUNTER — PES CALL (OUTPATIENT)
Dept: ADMINISTRATIVE | Facility: CLINIC | Age: 73
End: 2023-05-30
Payer: MEDICARE

## 2023-06-08 ENCOUNTER — PATIENT MESSAGE (OUTPATIENT)
Dept: ADMINISTRATIVE | Facility: HOSPITAL | Age: 73
End: 2023-06-08
Payer: MEDICARE

## 2023-06-08 ENCOUNTER — TELEPHONE (OUTPATIENT)
Dept: ADMINISTRATIVE | Facility: HOSPITAL | Age: 73
End: 2023-06-08
Payer: MEDICARE

## 2023-07-12 ENCOUNTER — PES CALL (OUTPATIENT)
Dept: ADMINISTRATIVE | Facility: CLINIC | Age: 73
End: 2023-07-12
Payer: MEDICARE

## 2023-07-14 ENCOUNTER — LAB VISIT (OUTPATIENT)
Dept: LAB | Facility: HOSPITAL | Age: 73
End: 2023-07-14
Attending: INTERNAL MEDICINE
Payer: MEDICARE

## 2023-07-14 ENCOUNTER — OFFICE VISIT (OUTPATIENT)
Dept: FAMILY MEDICINE | Facility: CLINIC | Age: 73
End: 2023-07-14
Payer: MEDICARE

## 2023-07-14 VITALS
TEMPERATURE: 99 F | SYSTOLIC BLOOD PRESSURE: 126 MMHG | BODY MASS INDEX: 24.72 KG/M2 | DIASTOLIC BLOOD PRESSURE: 80 MMHG | WEIGHT: 186.5 LBS | HEART RATE: 63 BPM | HEIGHT: 73 IN | OXYGEN SATURATION: 99 %

## 2023-07-14 DIAGNOSIS — Z85.46 HISTORY OF PROSTATE CANCER: ICD-10-CM

## 2023-07-14 DIAGNOSIS — E78.00 HYPERCHOLESTEREMIA: ICD-10-CM

## 2023-07-14 DIAGNOSIS — Z12.11 COLON CANCER SCREENING: ICD-10-CM

## 2023-07-14 DIAGNOSIS — E78.00 HYPERCHOLESTEREMIA: Primary | ICD-10-CM

## 2023-07-14 LAB — TESTOST SERPL-MCNC: 476 NG/DL (ref 304–1227)

## 2023-07-14 PROCEDURE — 99999 PR PBB SHADOW E&M-EST. PATIENT-LVL IV: ICD-10-PCS | Mod: PBBFAC,,, | Performed by: INTERNAL MEDICINE

## 2023-07-14 PROCEDURE — 99213 PR OFFICE/OUTPT VISIT, EST, LEVL III, 20-29 MIN: ICD-10-PCS | Mod: S$PBB,,, | Performed by: INTERNAL MEDICINE

## 2023-07-14 PROCEDURE — 84403 ASSAY OF TOTAL TESTOSTERONE: CPT | Performed by: INTERNAL MEDICINE

## 2023-07-14 PROCEDURE — 99213 OFFICE O/P EST LOW 20 MIN: CPT | Mod: S$PBB,,, | Performed by: INTERNAL MEDICINE

## 2023-07-14 PROCEDURE — 99999 PR PBB SHADOW E&M-EST. PATIENT-LVL IV: CPT | Mod: PBBFAC,,, | Performed by: INTERNAL MEDICINE

## 2023-07-14 PROCEDURE — 99214 OFFICE O/P EST MOD 30 MIN: CPT | Mod: PBBFAC,PO | Performed by: INTERNAL MEDICINE

## 2023-07-14 PROCEDURE — 36415 COLL VENOUS BLD VENIPUNCTURE: CPT | Mod: PO | Performed by: INTERNAL MEDICINE

## 2023-07-14 NOTE — PROGRESS NOTES
Subjective:       Patient ID: Darnell Ruelas Jr. is a 73 y.o. male.    Chief Complaint: Follow-up (6 month f/u) and Hyperlipidemia    Follow-up  Pertinent negatives include no abdominal pain, arthralgias, chest pain, chills, coughing, diaphoresis, fatigue, fever, headaches, joint swelling, myalgias, nausea, neck pain, numbness, rash, sore throat, vomiting or weakness.   Hyperlipidemia  Pertinent negatives include no chest pain, myalgias or shortness of breath.   Past Medical History:   Diagnosis Date    Chronic hepatitis C     Hypercholesteremia     Kidney stones     Malignant neoplasm of prostate 2/14/2013     Past Surgical History:   Procedure Laterality Date    CATARACT EXTRACTION EXTRACAPSULAR W/ INTRAOCULAR LENS IMPLANTATION Bilateral     CHOLECYSTECTOMY      KIDNEY STONE SURGERY  1988    TONSILLECTOMY       History reviewed. No pertinent family history.  Social History     Socioeconomic History    Marital status:    Tobacco Use    Smoking status: Former     Types: Cigarettes    Smokeless tobacco: Former   Substance and Sexual Activity    Alcohol use: Yes     Alcohol/week: 0.0 standard drinks     Comment: rarely    Drug use: No    Sexual activity: Yes     Review of Systems   Constitutional:  Negative for activity change, appetite change, chills, diaphoresis, fatigue, fever and unexpected weight change.   HENT:  Negative for drooling, ear discharge, ear pain, facial swelling, hearing loss, mouth sores, nosebleeds, postnasal drip, rhinorrhea, sinus pressure, sneezing, sore throat, tinnitus, trouble swallowing and voice change.    Eyes:  Negative for photophobia, redness and visual disturbance.   Respiratory:  Negative for apnea, cough, choking, chest tightness, shortness of breath and wheezing.    Cardiovascular:  Negative for chest pain, palpitations and leg swelling.   Gastrointestinal:  Negative for abdominal distention, abdominal pain, anal bleeding, blood in stool, constipation, diarrhea, nausea, rectal  pain and vomiting.   Endocrine: Negative for cold intolerance, heat intolerance, polydipsia, polyphagia and polyuria.   Genitourinary:  Negative for difficulty urinating, dysuria, enuresis, flank pain, frequency, genital sores, hematuria and urgency.   Musculoskeletal:  Negative for arthralgias, back pain, gait problem, joint swelling, myalgias, neck pain and neck stiffness.   Skin:  Negative for color change, pallor, rash and wound.   Allergic/Immunologic: Negative for food allergies and immunocompromised state.   Neurological:  Negative for dizziness, tremors, seizures, syncope, facial asymmetry, speech difficulty, weakness, light-headedness, numbness and headaches.   Hematological:  Negative for adenopathy. Does not bruise/bleed easily.   Psychiatric/Behavioral:  Negative for agitation, behavioral problems, confusion, decreased concentration, dysphoric mood, hallucinations, self-injury, sleep disturbance and suicidal ideas. The patient is not nervous/anxious and is not hyperactive.      Objective:      Physical Exam  Vitals and nursing note reviewed.   Constitutional:       General: He is not in acute distress.     Appearance: Normal appearance. He is well-developed. He is not diaphoretic.   HENT:      Head: Normocephalic and atraumatic.      Mouth/Throat:      Pharynx: No oropharyngeal exudate.   Eyes:      General: No scleral icterus.     Pupils: Pupils are equal, round, and reactive to light.   Neck:      Thyroid: No thyromegaly.      Vascular: No carotid bruit or JVD.      Trachea: No tracheal deviation.   Cardiovascular:      Rate and Rhythm: Normal rate and regular rhythm.      Heart sounds: Normal heart sounds.   Pulmonary:      Effort: Pulmonary effort is normal. No respiratory distress.      Breath sounds: Normal breath sounds. No wheezing or rales.   Chest:      Chest wall: No tenderness.   Abdominal:      General: Bowel sounds are normal. There is no distension.      Palpations: Abdomen is soft.       Tenderness: There is no abdominal tenderness. There is no guarding or rebound.   Musculoskeletal:         General: No tenderness. Normal range of motion.      Cervical back: Normal range of motion and neck supple.   Lymphadenopathy:      Cervical: No cervical adenopathy.   Skin:     General: Skin is warm and dry.      Coloration: Skin is not pale.      Findings: No erythema or rash.   Neurological:      Mental Status: He is alert and oriented to person, place, and time.   Psychiatric:         Behavior: Behavior normal.         Thought Content: Thought content normal.         Judgment: Judgment normal.       CMP  Sodium   Date Value Ref Range Status   01/13/2023 140 136 - 145 mmol/L Final     Potassium   Date Value Ref Range Status   01/13/2023 4.4 3.5 - 5.1 mmol/L Final     Chloride   Date Value Ref Range Status   01/13/2023 107 95 - 110 mmol/L Final     CO2   Date Value Ref Range Status   01/13/2023 28 23 - 29 mmol/L Final     Glucose   Date Value Ref Range Status   01/13/2023 97 70 - 110 mg/dL Final     BUN   Date Value Ref Range Status   01/13/2023 14 8 - 23 mg/dL Final     Creatinine   Date Value Ref Range Status   01/13/2023 1.1 0.5 - 1.4 mg/dL Final     Calcium   Date Value Ref Range Status   01/13/2023 9.2 8.7 - 10.5 mg/dL Final     Total Protein   Date Value Ref Range Status   01/13/2023 7.4 6.0 - 8.4 g/dL Final     Albumin   Date Value Ref Range Status   01/13/2023 4.1 3.5 - 5.2 g/dL Final     Total Bilirubin   Date Value Ref Range Status   01/13/2023 0.4 0.1 - 1.0 mg/dL Final     Comment:     For infants and newborns, interpretation of results should be based  on gestational age, weight and in agreement with clinical  observations.    Premature Infant recommended reference ranges:  Up to 24 hours.............<8.0 mg/dL  Up to 48 hours............<12.0 mg/dL  3-5 days..................<15.0 mg/dL  6-29 days.................<15.0 mg/dL       Alkaline Phosphatase   Date Value Ref Range Status   01/13/2023 83  55 - 135 U/L Final     AST   Date Value Ref Range Status   01/13/2023 24 10 - 40 U/L Final     ALT   Date Value Ref Range Status   01/13/2023 16 10 - 44 U/L Final     Anion Gap   Date Value Ref Range Status   01/13/2023 5 (L) 8 - 16 mmol/L Final     eGFR if    Date Value Ref Range Status   11/09/2021 >60.0 >60 mL/min/1.73 m^2 Final     eGFR if non    Date Value Ref Range Status   11/09/2021 >60.0 >60 mL/min/1.73 m^2 Final     Comment:     Calculation used to obtain the estimated glomerular filtration  rate (eGFR) is the CKD-EPI equation.        Lab Results   Component Value Date    WBC 5.95 01/13/2023    HGB 15.6 01/13/2023    HCT 50.0 01/13/2023    MCV 90 01/13/2023     01/13/2023     Lab Results   Component Value Date    CHOL 186 01/13/2023     Lab Results   Component Value Date    HDL 51 01/13/2023     Lab Results   Component Value Date    LDLCALC 121.0 01/13/2023     Lab Results   Component Value Date    TRIG 70 01/13/2023     Lab Results   Component Value Date    CHOLHDL 27.4 01/13/2023     Lab Results   Component Value Date    TSH 1.222 08/13/2021     No results found for: LABA1C, HGBA1C  Assessment:       1. Hypercholesteremia    2. History of prostate cancer    3. Colon cancer screening        Plan:   Hypercholesteremia  -     Testosterone; Future; Expected date: 07/14/2023    History of prostate cancer    Colon cancer screening  -     Ambulatory referral/consult to Endo Procedure ; Future; Expected date: 07/15/2023      Stable-----------continue meds-------------as above-------------f/u 6 months--------------

## 2023-07-17 ENCOUNTER — HOSPITAL ENCOUNTER (OUTPATIENT)
Dept: PREADMISSION TESTING | Facility: HOSPITAL | Age: 73
Discharge: HOME | End: 2023-07-17
Attending: INTERNAL MEDICINE
Payer: MEDICARE

## 2023-07-17 DIAGNOSIS — Z12.11 COLON CANCER SCREENING: ICD-10-CM

## 2023-07-28 ENCOUNTER — PATIENT MESSAGE (OUTPATIENT)
Dept: ADMINISTRATIVE | Facility: HOSPITAL | Age: 73
End: 2023-07-28
Payer: MEDICARE

## 2023-07-28 ENCOUNTER — TELEPHONE (OUTPATIENT)
Dept: ADMINISTRATIVE | Facility: HOSPITAL | Age: 73
End: 2023-07-28
Payer: MEDICARE

## 2023-08-16 ENCOUNTER — OFFICE VISIT (OUTPATIENT)
Dept: FAMILY MEDICINE | Facility: CLINIC | Age: 73
End: 2023-08-16
Payer: MEDICARE

## 2023-08-16 VITALS
TEMPERATURE: 98 F | RESPIRATION RATE: 16 BRPM | OXYGEN SATURATION: 98 % | BODY MASS INDEX: 23.97 KG/M2 | DIASTOLIC BLOOD PRESSURE: 80 MMHG | WEIGHT: 181.69 LBS | HEART RATE: 72 BPM | SYSTOLIC BLOOD PRESSURE: 110 MMHG

## 2023-08-16 DIAGNOSIS — R53.83 FATIGUE, UNSPECIFIED TYPE: Primary | ICD-10-CM

## 2023-08-16 DIAGNOSIS — U07.1 COVID-19 VIRUS INFECTION: ICD-10-CM

## 2023-08-16 LAB
CTP QC/QA: YES
SARS-COV-2 RDRP RESP QL NAA+PROBE: POSITIVE

## 2023-08-16 PROCEDURE — 99213 OFFICE O/P EST LOW 20 MIN: CPT | Mod: S$PBB,,, | Performed by: INTERNAL MEDICINE

## 2023-08-16 PROCEDURE — 87635 SARS-COV-2 COVID-19 AMP PRB: CPT | Mod: PBBFAC,PO | Performed by: INTERNAL MEDICINE

## 2023-08-16 PROCEDURE — 99999 PR PBB SHADOW E&M-EST. PATIENT-LVL IV: ICD-10-PCS | Mod: PBBFAC,,, | Performed by: INTERNAL MEDICINE

## 2023-08-16 PROCEDURE — 99214 OFFICE O/P EST MOD 30 MIN: CPT | Mod: PBBFAC,PO | Performed by: INTERNAL MEDICINE

## 2023-08-16 PROCEDURE — 99999PBSHW: Mod: PBBFAC,,,

## 2023-08-16 PROCEDURE — 99999PBSHW: ICD-10-PCS | Mod: PBBFAC,,,

## 2023-08-16 PROCEDURE — 99213 PR OFFICE/OUTPT VISIT, EST, LEVL III, 20-29 MIN: ICD-10-PCS | Mod: S$PBB,,, | Performed by: INTERNAL MEDICINE

## 2023-08-16 PROCEDURE — 99999 PR PBB SHADOW E&M-EST. PATIENT-LVL IV: CPT | Mod: PBBFAC,,, | Performed by: INTERNAL MEDICINE

## 2023-08-16 NOTE — PROGRESS NOTES
Subjective:       Patient ID: Darnell Ruelas Jr. is a 73 y.o. male.    Chief Complaint: Cough and Fatigue    2-3 days fatigue and slight cough-------------no other symptoms---------    Cough  Pertinent negatives include no chest pain, chills, ear pain, eye redness, fever, headaches, myalgias, postnasal drip, rash, rhinorrhea, sore throat, shortness of breath or wheezing.   Fatigue  Associated symptoms include coughing and fatigue. Pertinent negatives include no abdominal pain, arthralgias, chest pain, chills, diaphoresis, fever, headaches, joint swelling, myalgias, nausea, neck pain, numbness, rash, sore throat, vomiting or weakness.     Past Medical History:   Diagnosis Date    Chronic hepatitis C     Hypercholesteremia     Kidney stones     Malignant neoplasm of prostate 2/14/2013     Past Surgical History:   Procedure Laterality Date    CATARACT EXTRACTION EXTRACAPSULAR W/ INTRAOCULAR LENS IMPLANTATION Bilateral     CHOLECYSTECTOMY      KIDNEY STONE SURGERY  1988    TONSILLECTOMY       No family history on file.  Social History     Socioeconomic History    Marital status:    Tobacco Use    Smoking status: Former     Current packs/day: 0.00     Types: Cigarettes    Smokeless tobacco: Former   Substance and Sexual Activity    Alcohol use: Yes     Alcohol/week: 0.0 standard drinks of alcohol     Comment: rarely    Drug use: No    Sexual activity: Yes     Social Determinants of Health     Financial Resource Strain: Low Risk  (6/6/2022)    Overall Financial Resource Strain (CARDIA)     Difficulty of Paying Living Expenses: Not hard at all   Food Insecurity: No Food Insecurity (6/6/2022)    Hunger Vital Sign     Worried About Running Out of Food in the Last Year: Never true     Ran Out of Food in the Last Year: Never true   Transportation Needs: No Transportation Needs (6/6/2022)    PRAPARE - Transportation     Lack of Transportation (Medical): No     Lack of Transportation (Non-Medical): No   Physical Activity:  Sufficiently Active (6/6/2022)    Exercise Vital Sign     Days of Exercise per Week: 5 days     Minutes of Exercise per Session: 150+ min   Stress: No Stress Concern Present (6/6/2022)    [employer] - Occupational Stress Questionnaire     Feeling of Stress : Not at all   Social Connections: Socially Integrated (6/6/2022)    Social Connection and Isolation Panel [NHANES]     Frequency of Communication with Friends and Family: More than three times a week     Frequency of Social Gatherings with Friends and Family: More than three times a week     Attends Confucianist Services: More than 4 times per year     Attends Club or Organization Meetings: More than 4 times per year     Marital Status:    Housing Stability: Unknown (6/6/2022)    Housing Stability Vital Sign     Unable to Pay for Housing in the Last Year: No     Unstable Housing in the Last Year: No     Review of Systems   Constitutional:  Positive for fatigue. Negative for activity change, appetite change, chills, diaphoresis, fever and unexpected weight change.   HENT:  Negative for drooling, ear discharge, ear pain, facial swelling, hearing loss, mouth sores, nosebleeds, postnasal drip, rhinorrhea, sinus pressure, sneezing, sore throat, tinnitus, trouble swallowing and voice change.    Eyes:  Negative for photophobia, redness and visual disturbance.   Respiratory:  Positive for cough. Negative for apnea, choking, chest tightness, shortness of breath and wheezing.    Cardiovascular:  Negative for chest pain, palpitations and leg swelling.   Gastrointestinal:  Negative for abdominal distention, abdominal pain, anal bleeding, blood in stool, constipation, diarrhea, nausea, rectal pain and vomiting.   Endocrine: Negative for cold intolerance, heat intolerance, polydipsia, polyphagia and polyuria.   Genitourinary:  Negative for difficulty urinating, dysuria, enuresis, flank pain, frequency, genital sores, hematuria and urgency.    Musculoskeletal:  Negative for arthralgias, back pain, gait problem, joint swelling, myalgias, neck pain and neck stiffness.   Skin:  Negative for color change, pallor, rash and wound.   Allergic/Immunologic: Negative for food allergies and immunocompromised state.   Neurological:  Negative for dizziness, tremors, seizures, syncope, facial asymmetry, speech difficulty, weakness, light-headedness, numbness and headaches.   Hematological:  Negative for adenopathy. Does not bruise/bleed easily.   Psychiatric/Behavioral:  Negative for agitation, behavioral problems, confusion, decreased concentration, dysphoric mood, hallucinations, self-injury, sleep disturbance and suicidal ideas. The patient is not nervous/anxious and is not hyperactive.        Objective:      Physical Exam  Vitals and nursing note reviewed.   Constitutional:       General: He is not in acute distress.     Appearance: Normal appearance. He is well-developed. He is not diaphoretic.   HENT:      Head: Normocephalic and atraumatic.      Mouth/Throat:      Pharynx: No oropharyngeal exudate.   Eyes:      General: No scleral icterus.     Pupils: Pupils are equal, round, and reactive to light.   Neck:      Thyroid: No thyromegaly.      Vascular: No carotid bruit or JVD.      Trachea: No tracheal deviation.   Cardiovascular:      Rate and Rhythm: Normal rate and regular rhythm.      Heart sounds: Normal heart sounds.   Pulmonary:      Effort: Pulmonary effort is normal. No respiratory distress.      Breath sounds: Normal breath sounds. No wheezing or rales.   Chest:      Chest wall: No tenderness.   Abdominal:      General: Bowel sounds are normal. There is no distension.      Palpations: Abdomen is soft.      Tenderness: There is no abdominal tenderness. There is no guarding or rebound.   Musculoskeletal:         General: No tenderness. Normal range of motion.      Cervical back: Normal range of motion and neck supple.   Lymphadenopathy:      Cervical: No  cervical adenopathy.   Skin:     General: Skin is warm and dry.      Coloration: Skin is not pale.      Findings: No erythema or rash.   Neurological:      Mental Status: He is alert and oriented to person, place, and time.   Psychiatric:         Behavior: Behavior normal.         Thought Content: Thought content normal.         Judgment: Judgment normal.         CMP  Sodium   Date Value Ref Range Status   01/13/2023 140 136 - 145 mmol/L Final     Potassium   Date Value Ref Range Status   01/13/2023 4.4 3.5 - 5.1 mmol/L Final     Chloride   Date Value Ref Range Status   01/13/2023 107 95 - 110 mmol/L Final     CO2   Date Value Ref Range Status   01/13/2023 28 23 - 29 mmol/L Final     Glucose   Date Value Ref Range Status   01/13/2023 97 70 - 110 mg/dL Final     BUN   Date Value Ref Range Status   01/13/2023 14 8 - 23 mg/dL Final     Creatinine   Date Value Ref Range Status   01/13/2023 1.1 0.5 - 1.4 mg/dL Final     Calcium   Date Value Ref Range Status   01/13/2023 9.2 8.7 - 10.5 mg/dL Final     Total Protein   Date Value Ref Range Status   01/13/2023 7.4 6.0 - 8.4 g/dL Final     Albumin   Date Value Ref Range Status   01/13/2023 4.1 3.5 - 5.2 g/dL Final     Total Bilirubin   Date Value Ref Range Status   01/13/2023 0.4 0.1 - 1.0 mg/dL Final     Comment:     For infants and newborns, interpretation of results should be based  on gestational age, weight and in agreement with clinical  observations.    Premature Infant recommended reference ranges:  Up to 24 hours.............<8.0 mg/dL  Up to 48 hours............<12.0 mg/dL  3-5 days..................<15.0 mg/dL  6-29 days.................<15.0 mg/dL       Alkaline Phosphatase   Date Value Ref Range Status   01/13/2023 83 55 - 135 U/L Final     AST   Date Value Ref Range Status   01/13/2023 24 10 - 40 U/L Final     ALT   Date Value Ref Range Status   01/13/2023 16 10 - 44 U/L Final     Anion Gap   Date Value Ref Range Status   01/13/2023 5 (L) 8 - 16 mmol/L Final  "    eGFR if    Date Value Ref Range Status   11/09/2021 >60.0 >60 mL/min/1.73 m^2 Final     eGFR if non    Date Value Ref Range Status   11/09/2021 >60.0 >60 mL/min/1.73 m^2 Final     Comment:     Calculation used to obtain the estimated glomerular filtration  rate (eGFR) is the CKD-EPI equation.        Lab Results   Component Value Date    WBC 5.95 01/13/2023    HGB 15.6 01/13/2023    HCT 50.0 01/13/2023    MCV 90 01/13/2023     01/13/2023     Lab Results   Component Value Date    CHOL 186 01/13/2023     Lab Results   Component Value Date    HDL 51 01/13/2023     Lab Results   Component Value Date    LDLCALC 121.0 01/13/2023     Lab Results   Component Value Date    TRIG 70 01/13/2023     Lab Results   Component Value Date    CHOLHDL 27.4 01/13/2023     Lab Results   Component Value Date    TSH 1.222 08/13/2021     No results found for: "LABA1C", "HGBA1C"  Assessment:       1. Fatigue, unspecified type    2. COVID-19 virus infection        Plan:   Fatigue, unspecified type  -     POCT COVID-19 Rapid Screening-------------positive---------  -     COVID-19 virus infection    Other orders  -     nirmatrelvir-ritonavir 300 mg (150 mg x 2)-100 mg copackaged tablets (EUA); Take 3 tablets by mouth 2 (two) times daily for 5 days. Each dose contains 2 nirmatrelvir (pink tablets) and 1 ritonavir (white tablet). Take all 3 tablets together  Dispense: 30 tablet; Refill: 0      As above-----------paxlovid, treat symptoms, fluids, rest, isolate 5 days-----------go to er for worsening symptoms or SOB-------  "

## 2023-09-05 ENCOUNTER — TELEPHONE (OUTPATIENT)
Dept: FAMILY MEDICINE | Facility: CLINIC | Age: 73
End: 2023-09-05
Payer: MEDICARE

## 2023-09-05 NOTE — TELEPHONE ENCOUNTER
----- Message from Deyanira Reese sent at 9/5/2023  2:29 PM CDT -----  Contact: Darnell Patrick is calling to speak to the nurse regarding his last visit, he would like to discuss covid, please give him a call at [phone]    Thanks  LJ

## 2023-09-06 ENCOUNTER — TELEPHONE (OUTPATIENT)
Dept: FAMILY MEDICINE | Facility: CLINIC | Age: 73
End: 2023-09-06
Payer: MEDICARE

## 2023-09-06 NOTE — TELEPHONE ENCOUNTER
Call returned.  Wanted to know how he can avoid getting Covid.  When over with him about wearing a mask and washing his hands

## 2023-09-06 NOTE — TELEPHONE ENCOUNTER
----- Message from Yesenia Cheng sent at 9/6/2023 11:33 AM CDT -----  Contact: pt  Type:  Patient Returning Call    Who Called: pt  Who Left Message for Patient: nurse  Does the patient know what this is regarding?: last visit  Would the patient rather a call back or a response via [facility]? phone  Best Call Back Number: [phone]  Additional Information:

## 2023-10-17 NOTE — PROGRESS NOTES
SUBJECTIVE:     Darnell Ruelas Jr. is a 73 y.o. male is here today for:  SIDE PAIN    HPI:    Mr. Ruelas is here with c/o RT sided stomach pain x 5 days.  Reports on Friday 10/13/23 he had a hard sneeze and felt sudden pain to the right abd area.  Pain has persisted at that time, worsens only with movement, coughing or sneezing.  He was able to exercise and work-out yesterday w/out any problems.  Denies mass or swelling to area.  No txmt to date.  Okay for now, no pain with sitting.      REVIEW OF SYSTEMS:  Review of Systems   Constitutional: Negative.    Respiratory: Negative.     Cardiovascular: Negative.    Gastrointestinal:  Positive for abdominal pain. Negative for blood in stool, constipation, diarrhea, melena, nausea and vomiting.   Genitourinary: Negative.    Neurological: Negative.    All other systems reviewed and are negative.        CURRENT ALLERGIES:  Review of patient's allergies indicates:   Allergen Reactions    No known allergies        CURRENT PROBLEM LIST:  Patient Active Problem List   Diagnosis    Chronic hepatitis C    Hypercholesteremia    Kidney stones    AR (allergic rhinitis)    Bilateral primary osteoarthritis of knee    MGUS (monoclonal gammopathy of unknown significance)    Injury of right rotator cuff    Right rotator cuff tendinitis    Arthritis of right shoulder region    History of prostate cancer    Chronic kidney disease, stage 3a    Sensorineural hearing loss (SNHL) of both ears       CURRENT MEDICATION LIST:    Current Outpatient Medications:     cetirizine (ZYRTEC) 10 MG tablet, Take 1 tablet (10 mg total) by mouth once daily., Disp: 90 tablet, Rfl: 3    ERGOCALCIFEROL, VITAMIN D2, (VITAMIN D ORAL), Take 1 tablet by mouth once daily. , Disp: , Rfl:     meloxicam (MOBIC) 15 MG tablet, TAKE 1 TABLET DAILY AS NEEDED Strength: 15 mg, Disp: 30 tablet, Rfl: 3    multivitamin capsule, Take 1 capsule by mouth once daily., Disp: , Rfl:     simvastatin (ZOCOR) 20 MG tablet, Take 20 mg by  "mouth every evening., Disp: , Rfl:       HISTORY:  Past medical, surgical, family and social histories have been reviewed today.      OBJECTIVE:     Vitals:    10/18/23 0919   BP: 132/78   Pulse: 66   Temp: 98.8 °F (37.1 °C)   SpO2: 96%   Weight: 85.6 kg (188 lb 11.4 oz)   Height: 6' 1" (1.854 m)       Physical Exam  Vitals reviewed.   Constitutional:       General: He is not in acute distress.  Abdominal:      General: Bowel sounds are normal. There is no distension.      Palpations: Abdomen is soft.      Tenderness: There is abdominal tenderness. There is no right CVA tenderness, guarding or rebound. Negative signs include Altamirano's sign.      Hernia: No hernia is present.       Neurological:      Mental Status: He is alert and oriented to person, place, and time.       ASSESSMENT:     1. Abdominal pain, unspecified abdominal location  -     US Abdomen Complete; Future; Expected date: 10/18/2023    2. Injury  -     US Abdomen Complete; Future; Expected date: 10/18/2023      PLAN:     Pending US.  RTC as directed and/or prn.        JOSÉ MIGUEL Ramon  [facility]       25 minutes of total time spent on the encounter, which includes face to face time and non-face to face time preparing to see the patient.  This includes obtaining and/or reviewing separately obtained history, performing a medically appropriate examination and/or evaluation, and counseling and educating the patient/family/caregiver.  Includes documenting clinical information in the electronic or other health record, independently interpreting results (not separately reported) and communicating results to the patient/family/caregiver, with care coordination (not separately reported).  Medications, tests and/or procedures ordered as necessary along with referring and communicating with other health professionals (when not separately reported).    "

## 2023-10-18 ENCOUNTER — OFFICE VISIT (OUTPATIENT)
Dept: FAMILY MEDICINE | Facility: CLINIC | Age: 73
End: 2023-10-18
Payer: MEDICARE

## 2023-10-18 VITALS
BODY MASS INDEX: 25.01 KG/M2 | HEIGHT: 73 IN | DIASTOLIC BLOOD PRESSURE: 78 MMHG | OXYGEN SATURATION: 96 % | HEART RATE: 66 BPM | TEMPERATURE: 99 F | WEIGHT: 188.69 LBS | SYSTOLIC BLOOD PRESSURE: 132 MMHG

## 2023-10-18 DIAGNOSIS — T14.90XA INJURY: ICD-10-CM

## 2023-10-18 DIAGNOSIS — R10.9 ABDOMINAL PAIN, UNSPECIFIED ABDOMINAL LOCATION: Primary | ICD-10-CM

## 2023-10-18 PROCEDURE — 99213 OFFICE O/P EST LOW 20 MIN: CPT | Mod: S$PBB,,, | Performed by: REGISTERED NURSE

## 2023-10-18 PROCEDURE — 99999 PR PBB SHADOW E&M-EST. PATIENT-LVL III: CPT | Mod: PBBFAC,,, | Performed by: REGISTERED NURSE

## 2023-10-18 PROCEDURE — 99213 OFFICE O/P EST LOW 20 MIN: CPT | Mod: PBBFAC,PO | Performed by: REGISTERED NURSE

## 2023-10-18 PROCEDURE — 99999 PR PBB SHADOW E&M-EST. PATIENT-LVL III: ICD-10-PCS | Mod: PBBFAC,,, | Performed by: REGISTERED NURSE

## 2023-10-18 PROCEDURE — 99213 PR OFFICE/OUTPT VISIT, EST, LEVL III, 20-29 MIN: ICD-10-PCS | Mod: S$PBB,,, | Performed by: REGISTERED NURSE

## 2023-10-18 RX ORDER — SIMVASTATIN 20 MG/1
20 TABLET, FILM COATED ORAL NIGHTLY
COMMUNITY
Start: 2023-10-06 | End: 2024-02-20 | Stop reason: SDUPTHER

## 2023-10-18 RX ORDER — CETIRIZINE HYDROCHLORIDE 10 MG/1
10 TABLET ORAL
Qty: 90 TABLET | Refills: 3 | Status: SHIPPED | OUTPATIENT
Start: 2023-10-18

## 2023-10-18 NOTE — TELEPHONE ENCOUNTER
Care Due:                  Date            Visit Type   Department     Provider  --------------------------------------------------------------------------------                                EP -                              PRIMARY      [facility]  Last Visit: 08-      CARE ([facility])   LIZ Godfrey                              EP -                              PRIMARY      [facility]  Next Visit: 01-      CARE ([facility])   LIZ Godfrey                                                            Last  Test          Frequency    Reason                     Performed    Due Date  --------------------------------------------------------------------------------    CBC.........  12 months..  meloxicam................  01- 01-    CMP.........  12 months..  meloxicam................  01- 01-    [facility] Embedded Care Due Messages. Reference number: [id number].   10/18/2023 1:48:37 PM CDT

## 2023-10-18 NOTE — TELEPHONE ENCOUNTER
Refill Routing Note   Medication(s) are not appropriate for processing by [facility] for the following reason(s):      Medication outside of protocol    [facility] action(s):  Route  Approve Care Due:  Labs due     Medication Therapy Plan: Meloxicam-OP      Appointments  past 12m or future 3m with PCP    Date Provider   Last Visit   8/16/2023 Beto Godfrey MD   Next Visit   1/19/2024 Beto Godfrey MD   ED visits in past 90 days: 0        Note composed:5:02 PM 10/18/2023

## 2023-10-19 RX ORDER — MELOXICAM 15 MG/1
TABLET ORAL
Qty: 30 TABLET | Refills: 3 | Status: SHIPPED | OUTPATIENT
Start: 2023-10-19

## 2023-12-18 RX ORDER — SIMVASTATIN 20 MG/1
20 TABLET, FILM COATED ORAL NIGHTLY
Qty: 90 TABLET | Refills: 3 | OUTPATIENT
Start: 2023-12-18

## 2023-12-18 NOTE — TELEPHONE ENCOUNTER
Refill Decision Note   Darnell Ruelas  is requesting a refill authorization.  Brief Assessment and Rationale for Refill:  Quick Discontinue     Medication Therapy Plan:  Med d/c on 08/16/23 by PCP; [facility]      Comments:     Note composed:5:31 AM 12/18/2023

## 2023-12-18 NOTE — TELEPHONE ENCOUNTER
Care Due:                  Date            Visit Type   Department     Provider  --------------------------------------------------------------------------------                                EP -                              PRIMARY      [facility]  Last Visit: 08-      CARE ([facility])   MEDICINE       Beto Godfrey  Next Visit: None Scheduled  None         None Found                                                            Last  Test          Frequency    Reason                     Performed    Due Date  --------------------------------------------------------------------------------    CBC.........  12 months..  meloxicam................  01- 01-    CMP.........  12 months..  meloxicam................  01- 01-    Health [facility] Embedded Care Due Messages. Reference number: [id number].   12/18/2023 3:37:22 PM CST

## 2024-01-19 ENCOUNTER — LAB VISIT (OUTPATIENT)
Dept: LAB | Facility: HOSPITAL | Age: 74
End: 2024-01-19
Attending: INTERNAL MEDICINE
Payer: MEDICARE

## 2024-01-19 ENCOUNTER — OFFICE VISIT (OUTPATIENT)
Dept: FAMILY MEDICINE | Facility: CLINIC | Age: 74
End: 2024-01-19
Payer: MEDICARE

## 2024-01-19 VITALS
WEIGHT: 188.94 LBS | HEART RATE: 75 BPM | OXYGEN SATURATION: 98 % | BODY MASS INDEX: 25.04 KG/M2 | HEIGHT: 73 IN | DIASTOLIC BLOOD PRESSURE: 78 MMHG | TEMPERATURE: 97 F | RESPIRATION RATE: 18 BRPM | SYSTOLIC BLOOD PRESSURE: 126 MMHG

## 2024-01-19 DIAGNOSIS — Z85.46 HISTORY OF PROSTATE CANCER: ICD-10-CM

## 2024-01-19 DIAGNOSIS — E78.00 HYPERCHOLESTEREMIA: ICD-10-CM

## 2024-01-19 DIAGNOSIS — Z85.46 HISTORY OF PROSTATE CANCER: Primary | ICD-10-CM

## 2024-01-19 DIAGNOSIS — Z12.11 COLON CANCER SCREENING: ICD-10-CM

## 2024-01-19 LAB
ALBUMIN SERPL BCP-MCNC: 3.9 G/DL (ref 3.5–5.2)
ALP SERPL-CCNC: 70 U/L (ref 55–135)
ALT SERPL W/O P-5'-P-CCNC: 16 U/L (ref 10–44)
ANION GAP SERPL CALC-SCNC: 11 MMOL/L (ref 8–16)
AST SERPL-CCNC: 24 U/L (ref 10–40)
BASOPHILS # BLD AUTO: 0.02 K/UL (ref 0–0.2)
BASOPHILS NFR BLD: 0.3 % (ref 0–1.9)
BILIRUB SERPL-MCNC: 0.4 MG/DL (ref 0.1–1)
BUN SERPL-MCNC: 11 MG/DL (ref 8–23)
CALCIUM SERPL-MCNC: 9.3 MG/DL (ref 8.7–10.5)
CHLORIDE SERPL-SCNC: 105 MMOL/L (ref 95–110)
CHOLEST SERPL-MCNC: 161 MG/DL (ref 120–199)
CHOLEST/HDLC SERPL: 3.4 {RATIO} (ref 2–5)
CO2 SERPL-SCNC: 25 MMOL/L (ref 23–29)
CREAT SERPL-MCNC: 1.1 MG/DL (ref 0.5–1.4)
DIFFERENTIAL METHOD BLD: ABNORMAL
EOSINOPHIL # BLD AUTO: 0.1 K/UL (ref 0–0.5)
EOSINOPHIL NFR BLD: 2.2 % (ref 0–8)
ERYTHROCYTE [DISTWIDTH] IN BLOOD BY AUTOMATED COUNT: 13.6 % (ref 11.5–14.5)
EST. GFR  (NO RACE VARIABLE): >60 ML/MIN/1.73 M^2
GLUCOSE SERPL-MCNC: 100 MG/DL (ref 70–110)
HCT VFR BLD AUTO: 49.4 % (ref 40–54)
HDLC SERPL-MCNC: 48 MG/DL (ref 40–75)
HDLC SERPL: 29.8 % (ref 20–50)
HGB BLD-MCNC: 16 G/DL (ref 14–18)
IMM GRANULOCYTES # BLD AUTO: 0.03 K/UL (ref 0–0.04)
IMM GRANULOCYTES NFR BLD AUTO: 0.5 % (ref 0–0.5)
LDLC SERPL CALC-MCNC: 100.4 MG/DL (ref 63–159)
LYMPHOCYTES # BLD AUTO: 2.9 K/UL (ref 1–4.8)
LYMPHOCYTES NFR BLD: 48.2 % (ref 18–48)
MCH RBC QN AUTO: 28.9 PG (ref 27–31)
MCHC RBC AUTO-ENTMCNC: 32.4 G/DL (ref 32–36)
MCV RBC AUTO: 89 FL (ref 82–98)
MONOCYTES # BLD AUTO: 0.5 K/UL (ref 0.3–1)
MONOCYTES NFR BLD: 7.8 % (ref 4–15)
NEUTROPHILS # BLD AUTO: 2.5 K/UL (ref 1.8–7.7)
NEUTROPHILS NFR BLD: 41 % (ref 38–73)
NONHDLC SERPL-MCNC: 113 MG/DL
NRBC BLD-RTO: 0 /100 WBC
PLATELET # BLD AUTO: 224 K/UL (ref 150–450)
PMV BLD AUTO: 11.4 FL (ref 9.2–12.9)
POTASSIUM SERPL-SCNC: 4.7 MMOL/L (ref 3.5–5.1)
PROT SERPL-MCNC: 7.2 G/DL (ref 6–8.4)
RBC # BLD AUTO: 5.53 M/UL (ref 4.6–6.2)
SODIUM SERPL-SCNC: 141 MMOL/L (ref 136–145)
TRIGL SERPL-MCNC: 63 MG/DL (ref 30–150)
TSH SERPL DL<=0.005 MIU/L-ACNC: 1.31 UIU/ML (ref 0.4–4)
WBC # BLD AUTO: 6.02 K/UL (ref 3.9–12.7)

## 2024-01-19 PROCEDURE — 36415 COLL VENOUS BLD VENIPUNCTURE: CPT | Mod: PO | Performed by: INTERNAL MEDICINE

## 2024-01-19 PROCEDURE — 80061 LIPID PANEL: CPT | Performed by: INTERNAL MEDICINE

## 2024-01-19 PROCEDURE — 99999 PR PBB SHADOW E&M-EST. PATIENT-LVL IV: CPT | Mod: PBBFAC,,, | Performed by: INTERNAL MEDICINE

## 2024-01-19 PROCEDURE — 84153 ASSAY OF PSA TOTAL: CPT | Performed by: INTERNAL MEDICINE

## 2024-01-19 PROCEDURE — 80053 COMPREHEN METABOLIC PANEL: CPT | Performed by: INTERNAL MEDICINE

## 2024-01-19 PROCEDURE — 85025 COMPLETE CBC W/AUTO DIFF WBC: CPT | Performed by: INTERNAL MEDICINE

## 2024-01-19 PROCEDURE — 99214 OFFICE O/P EST MOD 30 MIN: CPT | Mod: S$PBB,,, | Performed by: INTERNAL MEDICINE

## 2024-01-19 PROCEDURE — 84443 ASSAY THYROID STIM HORMONE: CPT | Performed by: INTERNAL MEDICINE

## 2024-01-19 PROCEDURE — 99214 OFFICE O/P EST MOD 30 MIN: CPT | Mod: PBBFAC,PO | Performed by: INTERNAL MEDICINE

## 2024-01-19 NOTE — PROGRESS NOTES
Subjective:       Patient ID: Darnell Ruelas Jr. is a 74 y.o. male.    Chief Complaint: Follow-up and Hyperlipidemia    Follow-up  Pertinent negatives include no abdominal pain, arthralgias, chest pain, chills, coughing, diaphoresis, fatigue, fever, headaches, joint swelling, myalgias, nausea, neck pain, numbness, rash, sore throat, vomiting or weakness.   Hyperlipidemia  Pertinent negatives include no chest pain, myalgias or shortness of breath.     Past Medical History:   Diagnosis Date    Chronic hepatitis C     Hypercholesteremia     Kidney stones     Malignant neoplasm of prostate 2/14/2013     Past Surgical History:   Procedure Laterality Date    CATARACT EXTRACTION EXTRACAPSULAR W/ INTRAOCULAR LENS IMPLANTATION Bilateral     CHOLECYSTECTOMY      KIDNEY STONE SURGERY  1988    TONSILLECTOMY       No family history on file.  Social History     Socioeconomic History    Marital status:    Tobacco Use    Smoking status: Former     Types: Cigarettes    Smokeless tobacco: Former   Substance and Sexual Activity    Alcohol use: Yes     Alcohol/week: 0.0 standard drinks of alcohol     Comment: rarely    Drug use: No    Sexual activity: Yes     Social Determinants of Health     Financial Resource Strain: Low Risk  (6/6/2022)    Overall Financial Resource Strain (CARDIA)     Difficulty of Paying Living Expenses: Not hard at all   Food Insecurity: No Food Insecurity (6/6/2022)    Hunger Vital Sign     Worried About Running Out of Food in the Last Year: Never true     Ran Out of Food in the Last Year: Never true   Transportation Needs: No Transportation Needs (6/6/2022)    PRAPARE - Transportation     Lack of Transportation (Medical): No     Lack of Transportation (Non-Medical): No   Physical Activity: Sufficiently Active (6/6/2022)    Exercise Vital Sign     Days of Exercise per Week: 5 days     Minutes of Exercise per Session: 150+ min   Stress: No Stress Concern Present (6/6/2022)    [facility]  Occupational Health - Occupational Stress Questionnaire     Feeling of Stress : Not at all   Social Connections: Socially Integrated (6/6/2022)    Social Connection and Isolation Panel [NHANES]     Frequency of Communication with Friends and Family: More than three times a week     Frequency of Social Gatherings with Friends and Family: More than three times a week     Attends Voodoo Services: More than 4 times per year     Attends Club or Organization Meetings: More than 4 times per year     Marital Status:    Housing Stability: Unknown (6/6/2022)    Housing Stability Vital Sign     Unable to Pay for Housing in the Last Year: No     Unstable Housing in the Last Year: No     Review of Systems   Constitutional:  Negative for activity change, appetite change, chills, diaphoresis, fatigue, fever and unexpected weight change.   HENT:  Negative for drooling, ear discharge, ear pain, facial swelling, hearing loss, mouth sores, nosebleeds, postnasal drip, rhinorrhea, sinus pressure, sneezing, sore throat, tinnitus, trouble swallowing and voice change.    Eyes:  Negative for photophobia, redness and visual disturbance.   Respiratory:  Negative for apnea, cough, choking, chest tightness, shortness of breath and wheezing.    Cardiovascular:  Negative for chest pain, palpitations and leg swelling.   Gastrointestinal:  Negative for abdominal distention, abdominal pain, anal bleeding, blood in stool, constipation, diarrhea, nausea, rectal pain and vomiting.   Endocrine: Negative for cold intolerance, heat intolerance, polydipsia, polyphagia and polyuria.   Genitourinary:  Negative for difficulty urinating, dysuria, enuresis, flank pain, frequency, genital sores, hematuria and urgency.   Musculoskeletal:  Negative for arthralgias, back pain, gait problem, joint swelling, myalgias, neck pain and neck stiffness.   Skin:  Negative for color change, pallor, rash and wound.   Allergic/Immunologic: Negative for food allergies  and immunocompromised state.   Neurological:  Negative for dizziness, tremors, seizures, syncope, facial asymmetry, speech difficulty, weakness, light-headedness, numbness and headaches.   Hematological:  Negative for adenopathy. Does not bruise/bleed easily.   Psychiatric/Behavioral:  Negative for agitation, behavioral problems, confusion, decreased concentration, dysphoric mood, hallucinations, self-injury, sleep disturbance and suicidal ideas. The patient is not nervous/anxious and is not hyperactive.        Objective:      Physical Exam  Vitals and nursing note reviewed.   Constitutional:       General: He is not in acute distress.     Appearance: Normal appearance. He is well-developed. He is not diaphoretic.   HENT:      Head: Normocephalic and atraumatic.      Mouth/Throat:      Pharynx: No oropharyngeal exudate.   Eyes:      General: No scleral icterus.     Pupils: Pupils are equal, round, and reactive to light.   Neck:      Thyroid: No thyromegaly.      Vascular: No carotid bruit or JVD.      Trachea: No tracheal deviation.   Cardiovascular:      Rate and Rhythm: Normal rate and regular rhythm.      Heart sounds: Normal heart sounds.   Pulmonary:      Effort: Pulmonary effort is normal. No respiratory distress.      Breath sounds: Normal breath sounds. No wheezing or rales.   Chest:      Chest wall: No tenderness.   Abdominal:      General: Bowel sounds are normal. There is no distension.      Palpations: Abdomen is soft.      Tenderness: There is no abdominal tenderness. There is no guarding or rebound.   Musculoskeletal:         General: No tenderness. Normal range of motion.      Cervical back: Normal range of motion and neck supple.   Lymphadenopathy:      Cervical: No cervical adenopathy.   Skin:     General: Skin is warm and dry.      Coloration: Skin is not pale.      Findings: No erythema or rash.   Neurological:      Mental Status: He is alert and oriented to person, place, and time.      Cranial  Nerves: No cranial nerve deficit.      Coordination: Coordination normal.   Psychiatric:         Behavior: Behavior normal.         Thought Content: Thought content normal.         Judgment: Judgment normal.         CMP  Sodium   Date Value Ref Range Status   01/13/2023 140 136 - 145 mmol/L Final     Potassium   Date Value Ref Range Status   01/13/2023 4.4 3.5 - 5.1 mmol/L Final     Chloride   Date Value Ref Range Status   01/13/2023 107 95 - 110 mmol/L Final     CO2   Date Value Ref Range Status   01/13/2023 28 23 - 29 mmol/L Final     Glucose   Date Value Ref Range Status   01/13/2023 97 70 - 110 mg/dL Final     BUN   Date Value Ref Range Status   01/13/2023 14 8 - 23 mg/dL Final     Creatinine   Date Value Ref Range Status   01/13/2023 1.1 0.5 - 1.4 mg/dL Final     Calcium   Date Value Ref Range Status   01/13/2023 9.2 8.7 - 10.5 mg/dL Final     Total Protein   Date Value Ref Range Status   01/13/2023 7.4 6.0 - 8.4 g/dL Final     Albumin   Date Value Ref Range Status   01/13/2023 4.1 3.5 - 5.2 g/dL Final     Total Bilirubin   Date Value Ref Range Status   01/13/2023 0.4 0.1 - 1.0 mg/dL Final     Comment:     For infants and newborns, interpretation of results should be based  on gestational age, weight and in agreement with clinical  observations.    Premature Infant recommended reference ranges:  Up to 24 hours.............<8.0 mg/dL  Up to 48 hours............<12.0 mg/dL  3-5 days..................<15.0 mg/dL  6-29 days.................<15.0 mg/dL       Alkaline Phosphatase   Date Value Ref Range Status   01/13/2023 83 55 - 135 U/L Final     AST   Date Value Ref Range Status   01/13/2023 24 10 - 40 U/L Final     ALT   Date Value Ref Range Status   01/13/2023 16 10 - 44 U/L Final     Anion Gap   Date Value Ref Range Status   01/13/2023 5 (L) 8 - 16 mmol/L Final     eGFR if    Date Value Ref Range Status   11/09/2021 >60.0 >60 mL/min/1.73 m^2 Final     eGFR if non    Date Value Ref  "Range Status   11/09/2021 >60.0 >60 mL/min/1.73 m^2 Final     Comment:     Calculation used to obtain the estimated glomerular filtration  rate (eGFR) is the CKD-EPI equation.        Lab Results   Component Value Date    WBC 5.95 01/13/2023    HGB 15.6 01/13/2023    HCT 50.0 01/13/2023    MCV 90 01/13/2023     01/13/2023     Lab Results   Component Value Date    CHOL 186 01/13/2023     Lab Results   Component Value Date    HDL 51 01/13/2023     Lab Results   Component Value Date    LDLCALC 121.0 01/13/2023     Lab Results   Component Value Date    TRIG 70 01/13/2023     Lab Results   Component Value Date    CHOLHDL 27.4 01/13/2023     Lab Results   Component Value Date    TSH 1.222 08/13/2021     No results found for: "LABA1C", "HGBA1C"  Assessment:       1. History of prostate cancer    2. Hypercholesteremia    3. Colon cancer screening        Plan:   History of prostate cancer  -     Prostate Specific Antigen, Diagnostic; Future; Expected date: 01/19/2024    Hypercholesteremia  -     Comprehensive Metabolic Panel; Future; Expected date: 01/19/2024  -     CBC Auto Differential; Future; Expected date: 01/19/2024  -     TSH; Future; Expected date: 01/19/2024  -     Lipid Panel; Future; Expected date: 01/19/2024    Colon cancer screening  -     Ambulatory referral/consult to Endo Procedure ; Future; Expected date: 01/20/2024      Stable-------------continue meds-------------watch diet-----------as above------------------------f/u 6 months--------------  "

## 2024-01-20 LAB — COMPLEXED PSA SERPL-MCNC: <0.01 NG/ML (ref 0–4)

## 2024-02-20 RX ORDER — SIMVASTATIN 20 MG/1
20 TABLET, FILM COATED ORAL NIGHTLY
Qty: 90 TABLET | Refills: 3 | Status: SHIPPED | OUTPATIENT
Start: 2024-02-20

## 2024-02-20 NOTE — TELEPHONE ENCOUNTER
No care due was identified.  Health [facility] Embedded Care Due Messages. Reference number: [id number].   2/20/2024 1:20:48 PM CST

## 2024-03-06 ENCOUNTER — OFFICE VISIT (OUTPATIENT)
Dept: FAMILY MEDICINE | Facility: CLINIC | Age: 74
End: 2024-03-06
Payer: MEDICARE

## 2024-03-06 VITALS
OXYGEN SATURATION: 96 % | WEIGHT: 186.63 LBS | HEIGHT: 73 IN | RESPIRATION RATE: 18 BRPM | TEMPERATURE: 98 F | HEART RATE: 86 BPM | BODY MASS INDEX: 24.73 KG/M2 | SYSTOLIC BLOOD PRESSURE: 104 MMHG | DIASTOLIC BLOOD PRESSURE: 72 MMHG

## 2024-03-06 DIAGNOSIS — R55 POSTURAL DIZZINESS WITH PRESYNCOPE: Primary | ICD-10-CM

## 2024-03-06 DIAGNOSIS — R42 POSTURAL DIZZINESS WITH PRESYNCOPE: Primary | ICD-10-CM

## 2024-03-06 PROCEDURE — 99999 PR PBB SHADOW E&M-EST. PATIENT-LVL III: CPT | Mod: PBBFAC,,, | Performed by: INTERNAL MEDICINE

## 2024-03-06 PROCEDURE — 99213 OFFICE O/P EST LOW 20 MIN: CPT | Mod: PBBFAC,PO | Performed by: INTERNAL MEDICINE

## 2024-03-06 PROCEDURE — 99213 OFFICE O/P EST LOW 20 MIN: CPT | Mod: S$PBB,,, | Performed by: INTERNAL MEDICINE

## 2024-03-06 NOTE — PROGRESS NOTES
Subjective     Patient ID: Darnell Ruelas Jr. is a 74 y.o. male.    Chief Complaint: Pre Syncope      HPI  Patient presents due to a presyncopal episode 2 weeks ago. At that time, patient had not eaten in many hours.  Review of Systems   Constitutional:  Negative for chills and fatigue.   Respiratory:  Negative for chest tightness and shortness of breath.    Cardiovascular:  Negative for chest pain and palpitations.   Gastrointestinal:  Negative for abdominal pain, constipation, diarrhea, nausea and vomiting.   Genitourinary:  Negative for frequency and urgency.   Neurological:  Negative for dizziness, numbness and headaches.          Objective       Current Outpatient Medications:     cetirizine (ZYRTEC) 10 MG tablet, TAKE 1 TABLET DAILY, Disp: 90 tablet, Rfl: 3    ERGOCALCIFEROL, VITAMIN D2, (VITAMIN D ORAL), Take 1 tablet by mouth once daily. , Disp: , Rfl:     meloxicam (MOBIC) 15 MG tablet, TAKE 1 TABLET DAILY AS NEEDED, Disp: 30 tablet, Rfl: 3    multivitamin capsule, Take 1 capsule by mouth once daily., Disp: , Rfl:     simvastatin (ZOCOR) 20 MG tablet, Take 1 tablet (20 mg total) by mouth every evening., Disp: 90 tablet, Rfl: 3     Physical Exam  Constitutional:       General: He is not in acute distress.     Appearance: Normal appearance. He is normal weight.   HENT:      Head: Normocephalic and atraumatic.   Cardiovascular:      Rate and Rhythm: Normal rate and regular rhythm.      Heart sounds: Normal heart sounds. No murmur heard.     No friction rub. No gallop.   Pulmonary:      Effort: Pulmonary effort is normal.      Breath sounds: Normal breath sounds. No wheezing, rhonchi or rales.   Abdominal:      General: Bowel sounds are normal. There is no distension.      Palpations: Abdomen is soft.      Tenderness: There is no abdominal tenderness. There is no rebound.   Skin:     General: Skin is warm and dry.      Coloration: Skin is not jaundiced.   Neurological:      General: No focal deficit present.       Mental Status: He is alert and oriented to person, place, and time. Mental status is at baseline.   Psychiatric:         Mood and Affect: Mood normal.         Behavior: Behavior normal.            Assessment and Plan     1. Postural dizziness with presyncope    Advised patient to eat small regular meals           No follow-ups on file.

## 2024-07-19 ENCOUNTER — OFFICE VISIT (OUTPATIENT)
Dept: FAMILY MEDICINE | Facility: CLINIC | Age: 74
End: 2024-07-19
Payer: MEDICARE

## 2024-07-19 ENCOUNTER — HOSPITAL ENCOUNTER (OUTPATIENT)
Dept: RADIOLOGY | Facility: HOSPITAL | Age: 74
Discharge: HOME | End: 2024-07-19
Attending: INTERNAL MEDICINE
Payer: MEDICARE

## 2024-07-19 VITALS
DIASTOLIC BLOOD PRESSURE: 70 MMHG | HEART RATE: 77 BPM | WEIGHT: 189.63 LBS | TEMPERATURE: 98 F | RESPIRATION RATE: 18 BRPM | HEIGHT: 73 IN | OXYGEN SATURATION: 95 % | SYSTOLIC BLOOD PRESSURE: 108 MMHG | BODY MASS INDEX: 25.13 KG/M2

## 2024-07-19 DIAGNOSIS — R05.9 COUGH, UNSPECIFIED TYPE: ICD-10-CM

## 2024-07-19 DIAGNOSIS — Z85.46 HISTORY OF PROSTATE CANCER: Primary | ICD-10-CM

## 2024-07-19 DIAGNOSIS — Z12.11 COLON CANCER SCREENING: ICD-10-CM

## 2024-07-19 DIAGNOSIS — E78.00 HYPERCHOLESTEREMIA: ICD-10-CM

## 2024-07-19 PROBLEM — N18.31 CHRONIC KIDNEY DISEASE, STAGE 3A: Status: RESOLVED | Noted: 2021-11-09 | Resolved: 2024-07-19

## 2024-07-19 PROCEDURE — 99214 OFFICE O/P EST MOD 30 MIN: CPT | Mod: S$PBB,,, | Performed by: INTERNAL MEDICINE

## 2024-07-19 PROCEDURE — 71046 X-RAY EXAM CHEST 2 VIEWS: CPT | Mod: TC,FY,PO

## 2024-07-19 PROCEDURE — 99999 PR PBB SHADOW E&M-EST. PATIENT-LVL IV: CPT | Mod: PBBFAC,,, | Performed by: INTERNAL MEDICINE

## 2024-07-19 PROCEDURE — 71046 X-RAY EXAM CHEST 2 VIEWS: CPT | Mod: 26,,, | Performed by: RADIOLOGY

## 2024-07-19 PROCEDURE — 99214 OFFICE O/P EST MOD 30 MIN: CPT | Mod: PBBFAC,PO,25 | Performed by: INTERNAL MEDICINE

## 2024-07-19 NOTE — PROGRESS NOTES
Subjective:       Patient ID: Darnell Ruelas Jr. is a 74 y.o. male.    Chief Complaint: Follow-up, Cough, and Hyperlipidemia    Follow-up  Associated symptoms include coughing. Pertinent negatives include no abdominal pain, arthralgias, chest pain, chills, diaphoresis, fatigue, fever, headaches, joint swelling, myalgias, nausea, neck pain, numbness, rash, sore throat, vomiting or weakness.   Cough  Pertinent negatives include no chest pain, chills, ear pain, eye redness, fever, headaches, myalgias, postnasal drip, rash, rhinorrhea, sore throat, shortness of breath or wheezing.   Hyperlipidemia  Pertinent negatives include no chest pain, myalgias or shortness of breath.     Past Medical History:   Diagnosis Date    Chronic hepatitis C     Hypercholesteremia     Kidney stones     Malignant neoplasm of prostate 2/14/2013     Past Surgical History:   Procedure Laterality Date    CATARACT EXTRACTION EXTRACAPSULAR W/ INTRAOCULAR LENS IMPLANTATION Bilateral     CHOLECYSTECTOMY      KIDNEY STONE SURGERY  1988    TONSILLECTOMY       No family history on file.  Social History     Socioeconomic History    Marital status:    Tobacco Use    Smoking status: Former     Types: Cigarettes    Smokeless tobacco: Former   Substance and Sexual Activity    Alcohol use: Yes     Alcohol/week: 0.0 standard drinks of alcohol     Comment: rarely    Drug use: No    Sexual activity: Yes     Social Determinants of Health     Financial Resource Strain: Low Risk  (6/6/2022)    Overall Financial Resource Strain (CARDIA)     Difficulty of Paying Living Expenses: Not hard at all   Food Insecurity: No Food Insecurity (6/6/2022)    Hunger Vital Sign     Worried About Running Out of Food in the Last Year: Never true     Ran Out of Food in the Last Year: Never true   Transportation Needs: No Transportation Needs (6/6/2022)    PRAPARE - Transportation     Lack of Transportation (Medical): No     Lack of Transportation (Non-Medical): No   Physical  Activity: Sufficiently Active (6/6/2022)    Exercise Vital Sign     Days of Exercise per Week: 5 days     Minutes of Exercise per Session: 150+ min   Stress: No Stress Concern Present (6/6/2022)    [employer] - Occupational Stress Questionnaire     Feeling of Stress : Not at all   Housing Stability: Unknown (6/6/2022)    Housing Stability Vital Sign     Unable to Pay for Housing in the Last Year: No     Unstable Housing in the Last Year: No     Review of Systems   Constitutional:  Negative for activity change, appetite change, chills, diaphoresis, fatigue, fever and unexpected weight change.   HENT:  Negative for drooling, ear discharge, ear pain, facial swelling, hearing loss, mouth sores, nosebleeds, postnasal drip, rhinorrhea, sinus pressure, sneezing, sore throat, tinnitus, trouble swallowing and voice change.    Eyes:  Negative for photophobia, redness and visual disturbance.   Respiratory:  Positive for cough. Negative for apnea, choking, chest tightness, shortness of breath and wheezing.    Cardiovascular:  Negative for chest pain, palpitations and leg swelling.   Gastrointestinal:  Negative for abdominal distention, abdominal pain, anal bleeding, blood in stool, constipation, diarrhea, nausea, rectal pain and vomiting.   Endocrine: Negative for cold intolerance, heat intolerance, polydipsia, polyphagia and polyuria.   Genitourinary:  Negative for difficulty urinating, dysuria, enuresis, flank pain, frequency, genital sores, hematuria and urgency.   Musculoskeletal:  Negative for arthralgias, back pain, gait problem, joint swelling, myalgias, neck pain and neck stiffness.   Skin:  Negative for color change, pallor, rash and wound.   Allergic/Immunologic: Negative for food allergies and immunocompromised state.   Neurological:  Negative for dizziness, tremors, seizures, syncope, facial asymmetry, speech difficulty, weakness, light-headedness, numbness and headaches.    Hematological:  Negative for adenopathy. Does not bruise/bleed easily.   Psychiatric/Behavioral:  Negative for agitation, behavioral problems, confusion, decreased concentration, dysphoric mood, hallucinations, self-injury, sleep disturbance and suicidal ideas. The patient is not nervous/anxious and is not hyperactive.        Objective:      Physical Exam  Vitals and nursing note reviewed.   Constitutional:       General: He is not in acute distress.     Appearance: Normal appearance. He is well-developed. He is not diaphoretic.   HENT:      Head: Normocephalic and atraumatic.      Mouth/Throat:      Pharynx: No oropharyngeal exudate.   Eyes:      General: No scleral icterus.     Pupils: Pupils are equal, round, and reactive to light.   Neck:      Thyroid: No thyromegaly.      Vascular: No carotid bruit or JVD.      Trachea: No tracheal deviation.   Cardiovascular:      Rate and Rhythm: Normal rate and regular rhythm.      Heart sounds: Normal heart sounds.   Pulmonary:      Effort: Pulmonary effort is normal. No respiratory distress.      Breath sounds: Normal breath sounds. No wheezing or rales.   Chest:      Chest wall: No tenderness.   Abdominal:      General: Bowel sounds are normal. There is no distension.      Palpations: Abdomen is soft.      Tenderness: There is no abdominal tenderness. There is no guarding or rebound.   Musculoskeletal:         General: No tenderness. Normal range of motion.      Cervical back: Normal range of motion and neck supple.   Lymphadenopathy:      Cervical: No cervical adenopathy.   Skin:     General: Skin is warm and dry.      Coloration: Skin is not pale.      Findings: No erythema or rash.   Neurological:      Mental Status: He is alert and oriented to person, place, and time.      Cranial Nerves: No cranial nerve deficit.      Coordination: Coordination normal.   Psychiatric:         Behavior: Behavior normal.         Thought Content: Thought content normal.          Judgment: Judgment normal.         CMP  Sodium   Date Value Ref Range Status   01/19/2024 141 136 - 145 mmol/L Final     Potassium   Date Value Ref Range Status   01/19/2024 4.7 3.5 - 5.1 mmol/L Final     Chloride   Date Value Ref Range Status   01/19/2024 105 95 - 110 mmol/L Final     CO2   Date Value Ref Range Status   01/19/2024 25 23 - 29 mmol/L Final     Glucose   Date Value Ref Range Status   01/19/2024 100 70 - 110 mg/dL Final     BUN   Date Value Ref Range Status   01/19/2024 11 8 - 23 mg/dL Final     Creatinine   Date Value Ref Range Status   01/19/2024 1.1 0.5 - 1.4 mg/dL Final     Calcium   Date Value Ref Range Status   01/19/2024 9.3 8.7 - 10.5 mg/dL Final     Total Protein   Date Value Ref Range Status   01/19/2024 7.2 6.0 - 8.4 g/dL Final     Albumin   Date Value Ref Range Status   01/19/2024 3.9 3.5 - 5.2 g/dL Final     Total Bilirubin   Date Value Ref Range Status   01/19/2024 0.4 0.1 - 1.0 mg/dL Final     Comment:     For infants and newborns, interpretation of results should be based  on gestational age, weight and in agreement with clinical  observations.    Premature Infant recommended reference ranges:  Up to 24 hours.............<8.0 mg/dL  Up to 48 hours............<12.0 mg/dL  3-5 days..................<15.0 mg/dL  6-29 days.................<15.0 mg/dL       Alkaline Phosphatase   Date Value Ref Range Status   01/19/2024 70 55 - 135 U/L Final     AST   Date Value Ref Range Status   01/19/2024 24 10 - 40 U/L Final     ALT   Date Value Ref Range Status   01/19/2024 16 10 - 44 U/L Final     Anion Gap   Date Value Ref Range Status   01/19/2024 11 8 - 16 mmol/L Final     eGFR if    Date Value Ref Range Status   11/09/2021 >60.0 >60 mL/min/1.73 m^2 Final     eGFR if non    Date Value Ref Range Status   11/09/2021 >60.0 >60 mL/min/1.73 m^2 Final     Comment:     Calculation used to obtain the estimated glomerular filtration  rate (eGFR) is the CKD-EPI equation.     "    Lab Results   Component Value Date    WBC 6.02 01/19/2024    HGB 16.0 01/19/2024    HCT 49.4 01/19/2024    MCV 89 01/19/2024     01/19/2024     Lab Results   Component Value Date    CHOL 161 01/19/2024     Lab Results   Component Value Date    HDL 48 01/19/2024     Lab Results   Component Value Date    LDLCALC 100.4 01/19/2024     Lab Results   Component Value Date    TRIG 63 01/19/2024     Lab Results   Component Value Date    CHOLHDL 29.8 01/19/2024     Lab Results   Component Value Date    TSH 1.312 01/19/2024     No results found for: "LABA1C", "HGBA1C"  Assessment:       1. History of prostate cancer    2. Hypercholesteremia    3. Cough, unspecified type    4. Colon cancer screening        Plan:   History of prostate cancer    Hypercholesteremia    Cough, unspecified type-----------call if persists-------------------------  -     X-Ray Chest PA And Lateral; Future; Expected date: 07/19/2024    Colon cancer screening  -     Fecal Immunochemical Test (iFOBT); Future; Expected date: 07/19/2024      Stable---------continue meds------------as above-------------------declines colon---------------------f/u 6 months--------  "

## 2024-08-16 ENCOUNTER — TELEPHONE (OUTPATIENT)
Dept: FAMILY MEDICINE | Facility: CLINIC | Age: 74
End: 2024-08-16
Payer: MEDICARE

## 2024-08-16 ENCOUNTER — OFFICE VISIT (OUTPATIENT)
Dept: FAMILY MEDICINE | Facility: CLINIC | Age: 74
End: 2024-08-16
Payer: MEDICARE

## 2024-08-16 VITALS
TEMPERATURE: 99 F | HEART RATE: 95 BPM | DIASTOLIC BLOOD PRESSURE: 82 MMHG | SYSTOLIC BLOOD PRESSURE: 116 MMHG | BODY MASS INDEX: 25.36 KG/M2 | WEIGHT: 191.38 LBS | OXYGEN SATURATION: 98 % | HEIGHT: 73 IN

## 2024-08-16 DIAGNOSIS — R05.1 ACUTE COUGH: Primary | ICD-10-CM

## 2024-08-16 PROCEDURE — 99213 OFFICE O/P EST LOW 20 MIN: CPT | Mod: PBBFAC,PO | Performed by: NURSE PRACTITIONER

## 2024-08-16 PROCEDURE — 99999 PR PBB SHADOW E&M-EST. PATIENT-LVL III: CPT | Mod: PBBFAC,,, | Performed by: NURSE PRACTITIONER

## 2024-08-16 RX ORDER — PROMETHAZINE HYDROCHLORIDE AND DEXTROMETHORPHAN HYDROBROMIDE 6.25; 15 MG/5ML; MG/5ML
10 SYRUP ORAL EVERY 6 HOURS PRN
Qty: 240 ML | Refills: 0 | Status: SHIPPED | OUTPATIENT
Start: 2024-08-16 | End: 2024-08-26

## 2024-08-16 RX ORDER — BENZONATATE 200 MG/1
200 CAPSULE ORAL 3 TIMES DAILY PRN
Qty: 30 CAPSULE | Refills: 1 | Status: SHIPPED | OUTPATIENT
Start: 2024-08-16

## 2024-08-16 RX ORDER — HYDROGEN PEROXIDE 3 %
20 SOLUTION, NON-ORAL MISCELLANEOUS
Qty: 30 CAPSULE | Refills: 11 | Status: SHIPPED | OUTPATIENT
Start: 2024-08-16 | End: 2025-08-16

## 2024-08-16 NOTE — PROGRESS NOTES
Darnell Ruelas Martha  08/16/2024  [id number]    Beto Godfrey MD  Patient Care Team:  Beto Godfrey MD as PCP - General (Internal Medicine)  Josesito Ga LPN as Care Coordinator (Internal Medicine)          Visit Type:an urgent visit for a new problem    Chief Complaint:  Chief Complaint   Patient presents with    Cough       History of Present Illness:    73 yo male presents with co continued cough for one week after URI.  Denies Sinus congestion, fever, chills, body aches   History:  Past Medical History:   Diagnosis Date    Chronic hepatitis C     Hypercholesteremia     Kidney stones     Malignant neoplasm of prostate 2/14/2013     Past Surgical History:   Procedure Laterality Date    CATARACT EXTRACTION EXTRACAPSULAR W/ INTRAOCULAR LENS IMPLANTATION Bilateral     CHOLECYSTECTOMY      KIDNEY STONE SURGERY  1988    TONSILLECTOMY       No family history on file.  Social History     Socioeconomic History    Marital status:    Tobacco Use    Smoking status: Former     Types: Cigarettes    Smokeless tobacco: Former   Substance and Sexual Activity    Alcohol use: Yes     Alcohol/week: 0.0 standard drinks of alcohol     Comment: rarely    Drug use: No    Sexual activity: Yes     Social Determinants of Health     Financial Resource Strain: Low Risk  (6/6/2022)    Overall Financial Resource Strain (CARDIA)     Difficulty of Paying Living Expenses: Not hard at all   Food Insecurity: No Food Insecurity (6/6/2022)    Hunger Vital Sign     Worried About Running Out of Food in the Last Year: Never true     Ran Out of Food in the Last Year: Never true   Transportation Needs: No Transportation Needs (6/6/2022)    PRAPARE - Transportation     Lack of Transportation (Medical): No     Lack of Transportation (Non-Medical): No   Physical Activity: Sufficiently Active (6/6/2022)    Exercise Vital Sign     Days of Exercise per Week: 5 days     Minutes of Exercise per Session: 150+ min   Stress: No Stress  Concern Present (6/6/2022)    [employer] - Occupational Stress Questionnaire     Feeling of Stress : Not at all   Housing Stability: Unknown (6/6/2022)    Housing Stability Vital Sign     Unable to Pay for Housing in the Last Year: No     Unstable Housing in the Last Year: No     Patient Active Problem List   Diagnosis    Hypercholesteremia    Kidney stones    AR (allergic rhinitis)    Bilateral primary osteoarthritis of knee    MGUS (monoclonal gammopathy of unknown significance)    Injury of right rotator cuff    Right rotator cuff tendinitis    Arthritis of right shoulder region    History of prostate cancer    Sensorineural hearing loss (SNHL) of both ears     Review of patient's allergies indicates:   Allergen Reactions    No known allergies        The following were reviewed at this visit: active problem list, medication list, allergies, family history, social history, and health maintenance.    Medications:  Current Outpatient Medications on File Prior to Visit   Medication Sig Dispense Refill    cetirizine (ZYRTEC) 10 MG tablet TAKE 1 TABLET DAILY 90 tablet 3    multivitamin capsule Take 1 capsule by mouth once daily.      simvastatin (ZOCOR) 20 MG tablet Take 1 tablet (20 mg total) by mouth every evening. 90 tablet 3    ERGOCALCIFEROL, VITAMIN D2, (VITAMIN D ORAL) Take 1 tablet by mouth once daily.  (Patient not taking: Reported on 7/19/2024)      meloxicam (MOBIC) 15 MG tablet TAKE 1 TABLET DAILY AS NEEDED (Patient not taking: Reported on 7/19/2024) 30 tablet 3     No current facility-administered medications on file prior to visit.       Medications have been reviewed and reconciled with patient at this visit.  Barriers to medications reviewed with patient.    Adverse reactions to current medications reviewed with patient..    Over the counter medications reviewed and reconciled with patient.    Exam:  Wt Readings from Last 3 Encounters:   08/16/24 86.8 kg (191 lb 5.8 oz)    07/19/24 86 kg (189 lb 9.5 oz)   03/06/24 84.6 kg (186 lb 9.9 oz)     Temp Readings from Last 3 Encounters:   08/16/24 99.2 °F (37.3 °C) (Tympanic)   07/19/24 97.6 °F (36.4 °C) (Tympanic)   03/06/24 97.7 °F (36.5 °C) (Oral)     BP Readings from Last 3 Encounters:   08/16/24 116/82   07/19/24 108/70   03/06/24 104/72     Pulse Readings from Last 3 Encounters:   08/16/24 95   07/19/24 77   03/06/24 86     Body mass index is 25.25 kg/m².      Review of Systems   Constitutional:  Negative for fever.   Respiratory:  Positive for cough. Negative for shortness of breath and wheezing.    Cardiovascular:  Negative for chest pain and palpitations.   Gastrointestinal:  Negative for nausea.   Neurological:  Negative for speech change, weakness and headaches.   All other systems reviewed and are negative.    Physical Exam  Vitals and nursing note reviewed.   Constitutional:       Appearance: Normal appearance. He is obese.   HENT:      Head: Normocephalic and atraumatic.      Right Ear: Tympanic membrane, ear canal and external ear normal.      Left Ear: Tympanic membrane, ear canal and external ear normal.      Nose: Nose normal.      Mouth/Throat:      Mouth: Mucous membranes are moist.      Pharynx: Oropharynx is clear.   Eyes:      Extraocular Movements: Extraocular movements intact.      Conjunctiva/sclera: Conjunctivae normal.      Pupils: Pupils are equal, round, and reactive to light.   Cardiovascular:      Rate and Rhythm: Normal rate and regular rhythm.      Pulses: Normal pulses.      Heart sounds: Normal heart sounds.   Pulmonary:      Effort: Pulmonary effort is normal.      Breath sounds: Normal breath sounds.   Abdominal:      General: Bowel sounds are normal.      Palpations: Abdomen is soft.   Musculoskeletal:         General: Normal range of motion.      Cervical back: Normal range of motion and neck supple.   Skin:     General: Skin is warm and dry.      Capillary Refill: Capillary refill takes less than 2  seconds.   Neurological:      General: No focal deficit present.      Mental Status: He is alert and oriented to person, place, and time.   Psychiatric:         Mood and Affect: Mood normal.         Behavior: Behavior normal.         Thought Content: Thought content normal.         Judgment: Judgment normal.         Laboratory Reviewed ({Yes)  Lab Results   Component Value Date    WBC 6.02 01/19/2024    HGB 16.0 01/19/2024    HCT 49.4 01/19/2024     01/19/2024    CHOL 161 01/19/2024    TRIG 63 01/19/2024    HDL 48 01/19/2024    ALT 16 01/19/2024    AST 24 01/19/2024     01/19/2024    K 4.7 01/19/2024     01/19/2024    CREATININE 1.1 01/19/2024    BUN 11 01/19/2024    CO2 25 01/19/2024    TSH 1.312 01/19/2024    PSA <0.01 09/02/2010    INR 1.0 10/10/2017       Darnell was seen today for cough.    Diagnoses and all orders for this visit:    Acute cough  -     promethazine-dextromethorphan (PROMETHAZINE-DM) 6.25-15 mg/5 mL Syrp; Take 10 mLs by mouth every 6 (six) hours as needed.  -     esomeprazole (NEXIUM) 20 MG capsule; Take 1 capsule (20 mg total) by mouth before breakfast.  -     benzonatate (TESSALON) 200 MG capsule; Take 1 capsule (200 mg total) by mouth 3 (three) times daily as needed for Cough.         Plan  Start medications prescribed today         Care Plan/Goals: Reviewed    Goals    None     Darnell was seen today for cough.    Diagnoses and all orders for this visit:    Acute cough  -     promethazine-dextromethorphan (PROMETHAZINE-DM) 6.25-15 mg/5 mL Syrp; Take 10 mLs by mouth every 6 (six) hours as needed.  -     esomeprazole (NEXIUM) 20 MG capsule; Take 1 capsule (20 mg total) by mouth before breakfast.  -     benzonatate (TESSALON) 200 MG capsule; Take 1 capsule (200 mg total) by mouth 3 (three) times daily as needed for Cough.       Follow up: Follow up if symptoms worsen or fail to improve.    After visit summary was printed and given to patient upon discharge today.  Patient  goals and care plan are included in After Visit Summary.

## 2024-08-16 NOTE — TELEPHONE ENCOUNTER
----- Message from Jannet Rice sent at 8/16/2024  9:38 AM CDT -----  Contact: PT  Type:  Same Day Appointment Request    Caller is requesting a same day appointment.  Caller declined first available appointment listed below.      Name of Caller:   PT  When is the first available appointment?  8/19/24  Symptoms:  Symptom: Cough  Outcome: Schedule an appointment to be seen within 24 hours.  Reason: Caller denied all higher acuity questions    The caller rejected this outcome  Best Call Back Number:  [phone]  Additional Information:  Can come in at anytime

## 2024-11-04 ENCOUNTER — TELEPHONE (OUTPATIENT)
Dept: FAMILY MEDICINE | Facility: CLINIC | Age: 74
End: 2024-11-04
Payer: MEDICARE

## 2024-11-04 RX ORDER — CETIRIZINE HYDROCHLORIDE 10 MG/1
10 TABLET ORAL DAILY
Qty: 90 TABLET | Refills: 3 | Status: SHIPPED | OUTPATIENT
Start: 2024-11-04

## 2024-11-04 RX ORDER — CETIRIZINE HYDROCHLORIDE 10 MG/1
TABLET ORAL
Refills: 0 | OUTPATIENT
Start: 2024-11-04

## 2024-11-04 RX ORDER — TADALAFIL 20 MG/1
20 TABLET ORAL DAILY PRN
Qty: 90 TABLET | Refills: 2 | Status: SHIPPED | OUTPATIENT
Start: 2024-11-04

## 2024-11-04 RX ORDER — TADALAFIL 20 MG/1
TABLET ORAL
Refills: 0 | OUTPATIENT
Start: 2024-11-04

## 2024-11-04 NOTE — TELEPHONE ENCOUNTER
----- Message from Leticia sent at 11/4/2024  2:41 PM CST -----  Contact: NICHOLAS Patrick@ [phone]  Patient is returning a phone call.    Who left a message for the patient: --Gris--    Does patient know what this is regarding:  --medication--    Would you like a call back, or a response through your [facility] portal?: --Call back--    Comments: Please call to advise.

## 2024-11-04 NOTE — TELEPHONE ENCOUNTER
No care due was identified.  Health [facility] Embedded Care Due Messages. Reference number: [id number].   11/04/2024 2:49:39 PM CST

## 2024-11-04 NOTE — TELEPHONE ENCOUNTER
Refill Decision Note   Darnell Lann  is requesting a refill authorization.  Brief Assessment and Rationale for Refill:  Quick Discontinue     Medication Therapy Plan: Pharmacy is requesting new scripts for the following medications without required information, (sig/ frequency/qty/etc)     Medication Reconciliation Completed: No   Comments:     No Care Gaps recommended.     Note composed:10:43 AM 11/04/2024

## 2024-11-04 NOTE — TELEPHONE ENCOUNTER
----- Message from Dayna sent at 11/4/2024  2:03 PM CST -----  Type:  RX Refill Request    Who Called: pt  Refill or New Rx:refill  RX Name and Strength:na (two medications)  Preferred Pharmacy with phone number:  [facility]  [address] [address]  Phone: [phone] Fax: [phone]  Local or Mail Order:mail  Ordering Provider:melinda  Would the patient rather a call back or a response via [facility]? call  Best Call Back Number:[phone]    Additional Information: requesting refills but not sure how to spell medications, requesting a call back today

## 2024-11-04 NOTE — TELEPHONE ENCOUNTER
Care Due:                  Date            Visit Type   Department     Provider  --------------------------------------------------------------------------------                                EP -                              PRIMARY      [facility]  Last Visit: 07-      CARE ([facility])   LIZ Godfrey                              EP -                              PRIMARY      [facility]  Next Visit: 01-      CARE ([facility])   LIZ Godfrey                                                            Last  Test          Frequency    Reason                     Performed    Due Date  --------------------------------------------------------------------------------    CBC.........  12 months..  meloxicam................  01- 01-    CMP.........  12 months..  meloxicam, simvastatin...  01- 01-    Lipid Panel.  12 months..  simvastatin..............  01- 01-    Health [facility] Embedded Care Due Messages. Reference number: [id number].   11/04/2024 6:18:50 AM CST

## 2025-01-24 ENCOUNTER — OFFICE VISIT (OUTPATIENT)
Dept: FAMILY MEDICINE | Facility: CLINIC | Age: 75
End: 2025-01-24
Payer: MEDICARE

## 2025-01-24 VITALS
HEART RATE: 79 BPM | HEIGHT: 73 IN | OXYGEN SATURATION: 98 % | SYSTOLIC BLOOD PRESSURE: 124 MMHG | WEIGHT: 193 LBS | DIASTOLIC BLOOD PRESSURE: 76 MMHG | TEMPERATURE: 97 F | BODY MASS INDEX: 25.58 KG/M2

## 2025-01-24 DIAGNOSIS — Z12.11 COLON CANCER SCREENING: ICD-10-CM

## 2025-01-24 DIAGNOSIS — E78.00 HYPERCHOLESTEREMIA: ICD-10-CM

## 2025-01-24 DIAGNOSIS — Z85.46 HISTORY OF PROSTATE CANCER: Primary | ICD-10-CM

## 2025-01-24 DIAGNOSIS — R55 PRE-SYNCOPE: ICD-10-CM

## 2025-01-24 PROCEDURE — 99214 OFFICE O/P EST MOD 30 MIN: CPT | Mod: PBBFAC,PO | Performed by: INTERNAL MEDICINE

## 2025-01-24 PROCEDURE — 99999 PR PBB SHADOW E&M-EST. PATIENT-LVL IV: CPT | Mod: PBBFAC,,, | Performed by: INTERNAL MEDICINE

## 2025-01-24 PROCEDURE — 99214 OFFICE O/P EST MOD 30 MIN: CPT | Mod: S$PBB,,, | Performed by: INTERNAL MEDICINE

## 2025-01-24 NOTE — PROGRESS NOTES
Subjective:       Patient ID: Darnell Ruelas Jr. is a 75 y.o. male.    Chief Complaint: 6mth f/u and Hyperlipidemia    3 episodes pre-syncope over last several years.        Does exercise vigorously---------    Hyperlipidemia  Pertinent negatives include no chest pain, myalgias or shortness of breath.     Past Medical History:   Diagnosis Date    Chronic hepatitis C     Hypercholesteremia     Kidney stones     Malignant neoplasm of prostate 2/14/2013     Past Surgical History:   Procedure Laterality Date    CATARACT EXTRACTION EXTRACAPSULAR W/ INTRAOCULAR LENS IMPLANTATION Bilateral     CHOLECYSTECTOMY      KIDNEY STONE SURGERY  1988    TONSILLECTOMY       No family history on file.  Social History     Socioeconomic History    Marital status:    Tobacco Use    Smoking status: Former     Types: Cigarettes    Smokeless tobacco: Former   Substance and Sexual Activity    Alcohol use: Yes     Alcohol/week: 0.0 standard drinks of alcohol     Comment: rarely    Drug use: No    Sexual activity: Yes     Social Drivers of Health     Financial Resource Strain: Low Risk  (6/6/2022)    Overall Financial Resource Strain (CARDIA)     Difficulty of Paying Living Expenses: Not hard at all   Food Insecurity: No Food Insecurity (6/6/2022)    Hunger Vital Sign     Worried About Running Out of Food in the Last Year: Never true     Ran Out of Food in the Last Year: Never true   Transportation Needs: No Transportation Needs (6/6/2022)    PRAPARE - Transportation     Lack of Transportation (Medical): No     Lack of Transportation (Non-Medical): No   Physical Activity: Sufficiently Active (6/6/2022)    Exercise Vital Sign     Days of Exercise per Week: 5 days     Minutes of Exercise per Session: 150+ min   Stress: No Stress Concern Present (6/6/2022)    [employer] - Occupational Stress Questionnaire     Feeling of Stress : Not at all   Housing Stability: Unknown (6/6/2022)    Housing Stability Vital Sign      Unable to Pay for Housing in the Last Year: No     Unstable Housing in the Last Year: No     Review of Systems   Constitutional:  Negative for activity change, appetite change, chills, diaphoresis, fatigue, fever and unexpected weight change.   HENT:  Negative for drooling, ear discharge, ear pain, facial swelling, hearing loss, mouth sores, nosebleeds, postnasal drip, rhinorrhea, sinus pressure, sneezing, sore throat, tinnitus, trouble swallowing and voice change.    Eyes:  Negative for photophobia, redness and visual disturbance.   Respiratory:  Negative for apnea, cough, choking, chest tightness, shortness of breath and wheezing.    Cardiovascular:  Negative for chest pain and palpitations.   Gastrointestinal:  Negative for abdominal distention, abdominal pain, anal bleeding, blood in stool, constipation, diarrhea, nausea and vomiting.   Endocrine: Negative for cold intolerance, heat intolerance, polydipsia, polyphagia and polyuria.   Genitourinary:  Negative for difficulty urinating, dysuria, enuresis, flank pain, frequency, genital sores, hematuria and urgency.   Musculoskeletal:  Positive for arthralgias. Negative for back pain, gait problem, joint swelling, myalgias, neck pain and neck stiffness.   Skin:  Negative for color change, pallor, rash and wound.   Allergic/Immunologic: Negative for food allergies and immunocompromised state.   Neurological:  Negative for dizziness, tremors, seizures, syncope, facial asymmetry, speech difficulty, weakness, light-headedness, numbness and headaches.   Hematological:  Negative for adenopathy. Does not bruise/bleed easily.   Psychiatric/Behavioral:  Negative for agitation, behavioral problems, confusion, decreased concentration, dysphoric mood, hallucinations, self-injury, sleep disturbance and suicidal ideas. The patient is not nervous/anxious and is not hyperactive.        Objective:      Physical Exam  Vitals and nursing note reviewed.   Constitutional:        General: He is not in acute distress.     Appearance: Normal appearance. He is well-developed. He is not diaphoretic.   HENT:      Head: Normocephalic and atraumatic.      Mouth/Throat:      Pharynx: No oropharyngeal exudate.   Eyes:      General: No scleral icterus.     Pupils: Pupils are equal, round, and reactive to light.   Neck:      Thyroid: No thyromegaly.      Vascular: No carotid bruit or JVD.      Trachea: No tracheal deviation.   Cardiovascular:      Rate and Rhythm: Normal rate and regular rhythm.      Heart sounds: Normal heart sounds.   Pulmonary:      Effort: Pulmonary effort is normal. No respiratory distress.      Breath sounds: Normal breath sounds. No wheezing or rales.   Chest:      Chest wall: No tenderness.   Abdominal:      General: Bowel sounds are normal. There is no distension.      Palpations: Abdomen is soft.      Tenderness: There is no abdominal tenderness. There is no guarding or rebound.   Musculoskeletal:         General: No tenderness. Normal range of motion.      Cervical back: Normal range of motion and neck supple.   Lymphadenopathy:      Cervical: No cervical adenopathy.   Skin:     General: Skin is warm and dry.      Coloration: Skin is not pale.      Findings: No erythema or rash.   Neurological:      Mental Status: He is alert and oriented to person, place, and time.      Cranial Nerves: No cranial nerve deficit.      Coordination: Coordination normal.   Psychiatric:         Behavior: Behavior normal.         Thought Content: Thought content normal.         Judgment: Judgment normal.         CMP  Sodium   Date Value Ref Range Status   01/19/2024 141 136 - 145 mmol/L Final     Potassium   Date Value Ref Range Status   01/19/2024 4.7 3.5 - 5.1 mmol/L Final     Chloride   Date Value Ref Range Status   01/19/2024 105 95 - 110 mmol/L Final     CO2   Date Value Ref Range Status   01/19/2024 25 23 - 29 mmol/L Final     Glucose   Date Value Ref Range Status   01/19/2024 100 70 - 110  mg/dL Final     BUN   Date Value Ref Range Status   01/19/2024 11 8 - 23 mg/dL Final     Creatinine   Date Value Ref Range Status   01/19/2024 1.1 0.5 - 1.4 mg/dL Final     Calcium   Date Value Ref Range Status   01/19/2024 9.3 8.7 - 10.5 mg/dL Final     Total Protein   Date Value Ref Range Status   01/19/2024 7.2 6.0 - 8.4 g/dL Final     Albumin   Date Value Ref Range Status   01/19/2024 3.9 3.5 - 5.2 g/dL Final     Total Bilirubin   Date Value Ref Range Status   01/19/2024 0.4 0.1 - 1.0 mg/dL Final     Comment:     For infants and newborns, interpretation of results should be based  on gestational age, weight and in agreement with clinical  observations.    Premature Infant recommended reference ranges:  Up to 24 hours.............<8.0 mg/dL  Up to 48 hours............<12.0 mg/dL  3-5 days..................<15.0 mg/dL  6-29 days.................<15.0 mg/dL       Alkaline Phosphatase   Date Value Ref Range Status   01/19/2024 70 55 - 135 U/L Final     AST   Date Value Ref Range Status   01/19/2024 24 10 - 40 U/L Final     ALT   Date Value Ref Range Status   01/19/2024 16 10 - 44 U/L Final     Anion Gap   Date Value Ref Range Status   01/19/2024 11 8 - 16 mmol/L Final     eGFR if    Date Value Ref Range Status   11/09/2021 >60.0 >60 mL/min/1.73 m^2 Final     eGFR if non    Date Value Ref Range Status   11/09/2021 >60.0 >60 mL/min/1.73 m^2 Final     Comment:     Calculation used to obtain the estimated glomerular filtration  rate (eGFR) is the CKD-EPI equation.        Lab Results   Component Value Date    WBC 6.02 01/19/2024    HGB 16.0 01/19/2024    HCT 49.4 01/19/2024    MCV 89 01/19/2024     01/19/2024     Lab Results   Component Value Date    CHOL 161 01/19/2024     Lab Results   Component Value Date    HDL 48 01/19/2024     Lab Results   Component Value Date    LDLCALC 100.4 01/19/2024     Lab Results   Component Value Date    TRIG 63 01/19/2024     Lab Results   Component  "Value Date    CHOLHDL 29.8 01/19/2024     Lab Results   Component Value Date    TSH 1.312 01/19/2024     No results found for: "LABA1C", "HGBA1C"  Assessment:       1. History of prostate cancer    2. Hypercholesteremia    3. Pre-syncope    4. Colon cancer screening        Plan:   History of prostate cancer  -     Prostate Specific Antigen, Diagnostic; Future; Expected date: 01/24/2025    Hypercholesteremia  -     Comprehensive Metabolic Panel; Future; Expected date: 01/24/2025  -     CBC Auto Differential; Future; Expected date: 01/24/2025  -     Lipid Panel; Future; Expected date: 01/24/2025  -     TSH; Future; Expected date: 01/24/2025    Pre-syncope  -     Ambulatory referral/consult to Cardiology; Future; Expected date: 01/31/2025    Colon cancer screening  -     Fecal Immunochemical Test (iFOBT); Future; Expected date: 01/24/2025      Stable------------------continue meds--------------------as above-------------------------f/u 1 month  "

## 2025-01-31 ENCOUNTER — LAB VISIT (OUTPATIENT)
Dept: LAB | Facility: HOSPITAL | Age: 75
End: 2025-01-31
Attending: INTERNAL MEDICINE
Payer: MEDICARE

## 2025-01-31 DIAGNOSIS — Z00.00 ROUTINE HEALTH MAINTENANCE: ICD-10-CM

## 2025-01-31 DIAGNOSIS — Z85.46 HISTORY OF PROSTATE CANCER: ICD-10-CM

## 2025-01-31 DIAGNOSIS — E78.00 HYPERCHOLESTEREMIA: ICD-10-CM

## 2025-01-31 DIAGNOSIS — Z76.89 ENCOUNTER TO ESTABLISH CARE: Primary | ICD-10-CM

## 2025-01-31 PROCEDURE — 84153 ASSAY OF PSA TOTAL: CPT | Performed by: INTERNAL MEDICINE

## 2025-01-31 PROCEDURE — 80061 LIPID PANEL: CPT | Performed by: INTERNAL MEDICINE

## 2025-01-31 PROCEDURE — 84443 ASSAY THYROID STIM HORMONE: CPT | Performed by: INTERNAL MEDICINE

## 2025-01-31 PROCEDURE — 36415 COLL VENOUS BLD VENIPUNCTURE: CPT | Mod: PO | Performed by: INTERNAL MEDICINE

## 2025-01-31 PROCEDURE — 80053 COMPREHEN METABOLIC PANEL: CPT | Performed by: INTERNAL MEDICINE

## 2025-01-31 PROCEDURE — 85025 COMPLETE CBC W/AUTO DIFF WBC: CPT | Performed by: INTERNAL MEDICINE

## 2025-02-01 LAB
ALBUMIN SERPL BCP-MCNC: 3.9 G/DL (ref 3.5–5.2)
ALP SERPL-CCNC: 75 U/L (ref 40–150)
ALT SERPL W/O P-5'-P-CCNC: 19 U/L (ref 10–44)
ANION GAP SERPL CALC-SCNC: 12 MMOL/L (ref 8–16)
AST SERPL-CCNC: 24 U/L (ref 10–40)
BASOPHILS # BLD AUTO: 0.03 K/UL (ref 0–0.2)
BASOPHILS NFR BLD: 0.5 % (ref 0–1.9)
BILIRUB SERPL-MCNC: 0.4 MG/DL (ref 0.1–1)
BUN SERPL-MCNC: 12 MG/DL (ref 8–23)
CALCIUM SERPL-MCNC: 9.1 MG/DL (ref 8.7–10.5)
CHLORIDE SERPL-SCNC: 107 MMOL/L (ref 95–110)
CHOLEST SERPL-MCNC: 162 MG/DL (ref 120–199)
CHOLEST/HDLC SERPL: 3.7 {RATIO} (ref 2–5)
CO2 SERPL-SCNC: 25 MMOL/L (ref 23–29)
COMPLEXED PSA SERPL-MCNC: <0.01 NG/ML (ref 0–4)
CREAT SERPL-MCNC: 1.1 MG/DL (ref 0.5–1.4)
DIFFERENTIAL METHOD BLD: NORMAL
EOSINOPHIL # BLD AUTO: 0.1 K/UL (ref 0–0.5)
EOSINOPHIL NFR BLD: 2.1 % (ref 0–8)
ERYTHROCYTE [DISTWIDTH] IN BLOOD BY AUTOMATED COUNT: 13.9 % (ref 11.5–14.5)
EST. GFR  (NO RACE VARIABLE): >60 ML/MIN/1.73 M^2
GLUCOSE SERPL-MCNC: 84 MG/DL (ref 70–110)
HCT VFR BLD AUTO: 48.6 % (ref 40–54)
HDLC SERPL-MCNC: 44 MG/DL (ref 40–75)
HDLC SERPL: 27.2 % (ref 20–50)
HGB BLD-MCNC: 15.9 G/DL (ref 14–18)
IMM GRANULOCYTES # BLD AUTO: 0.02 K/UL (ref 0–0.04)
IMM GRANULOCYTES NFR BLD AUTO: 0.3 % (ref 0–0.5)
LDLC SERPL CALC-MCNC: 96 MG/DL (ref 63–159)
LYMPHOCYTES # BLD AUTO: 2.7 K/UL (ref 1–4.8)
LYMPHOCYTES NFR BLD: 41.4 % (ref 18–48)
MCH RBC QN AUTO: 29.2 PG (ref 27–31)
MCHC RBC AUTO-ENTMCNC: 32.7 G/DL (ref 32–36)
MCV RBC AUTO: 89 FL (ref 82–98)
MONOCYTES # BLD AUTO: 0.5 K/UL (ref 0.3–1)
MONOCYTES NFR BLD: 8.1 % (ref 4–15)
NEUTROPHILS # BLD AUTO: 3.1 K/UL (ref 1.8–7.7)
NEUTROPHILS NFR BLD: 47.6 % (ref 38–73)
NONHDLC SERPL-MCNC: 118 MG/DL
NRBC BLD-RTO: 0 /100 WBC
PLATELET # BLD AUTO: 207 K/UL (ref 150–450)
PMV BLD AUTO: 11.9 FL (ref 9.2–12.9)
POTASSIUM SERPL-SCNC: 4.2 MMOL/L (ref 3.5–5.1)
PROT SERPL-MCNC: 7.3 G/DL (ref 6–8.4)
RBC # BLD AUTO: 5.45 M/UL (ref 4.6–6.2)
SODIUM SERPL-SCNC: 144 MMOL/L (ref 136–145)
TRIGL SERPL-MCNC: 110 MG/DL (ref 30–150)
TSH SERPL DL<=0.005 MIU/L-ACNC: 1.37 UIU/ML (ref 0.4–4)
WBC # BLD AUTO: 6.52 K/UL (ref 3.9–12.7)

## 2025-02-09 PROBLEM — I73.9 PAD (PERIPHERAL ARTERY DISEASE): Status: ACTIVE | Noted: 2025-02-09

## 2025-02-09 PROBLEM — R94.31 ABNORMAL ECG: Status: ACTIVE | Noted: 2025-02-09

## 2025-02-09 PROBLEM — R55 NEAR SYNCOPE: Status: ACTIVE | Noted: 2025-02-09

## 2025-02-10 ENCOUNTER — OFFICE VISIT (OUTPATIENT)
Dept: CARDIOLOGY | Facility: CLINIC | Age: 75
End: 2025-02-10
Payer: MEDICARE

## 2025-02-10 ENCOUNTER — HOSPITAL ENCOUNTER (OUTPATIENT)
Dept: CARDIOLOGY | Facility: HOSPITAL | Age: 75
Discharge: HOME | End: 2025-02-10
Attending: INTERNAL MEDICINE
Payer: MEDICARE

## 2025-02-10 VITALS
WEIGHT: 195.31 LBS | HEART RATE: 65 BPM | HEIGHT: 73 IN | DIASTOLIC BLOOD PRESSURE: 82 MMHG | BODY MASS INDEX: 25.88 KG/M2 | OXYGEN SATURATION: 98 % | SYSTOLIC BLOOD PRESSURE: 138 MMHG

## 2025-02-10 DIAGNOSIS — E78.00 HYPERCHOLESTEREMIA: ICD-10-CM

## 2025-02-10 DIAGNOSIS — Z76.89 ENCOUNTER TO ESTABLISH CARE: ICD-10-CM

## 2025-02-10 DIAGNOSIS — R55 PRE-SYNCOPE: ICD-10-CM

## 2025-02-10 DIAGNOSIS — Z00.00 ROUTINE HEALTH MAINTENANCE: ICD-10-CM

## 2025-02-10 DIAGNOSIS — R55 NEAR SYNCOPE: Primary | ICD-10-CM

## 2025-02-10 DIAGNOSIS — I45.2 RBBB (RIGHT BUNDLE BRANCH BLOCK WITH LEFT ANTERIOR FASCICULAR BLOCK): ICD-10-CM

## 2025-02-10 DIAGNOSIS — I73.9 PAD (PERIPHERAL ARTERY DISEASE): ICD-10-CM

## 2025-02-10 DIAGNOSIS — R94.31 ABNORMAL ECG: ICD-10-CM

## 2025-02-10 LAB
OHS QRS DURATION: 138 MS
OHS QTC CALCULATION: 451 MS

## 2025-02-10 PROCEDURE — 93005 ELECTROCARDIOGRAM TRACING: CPT

## 2025-02-10 PROCEDURE — 93010 ELECTROCARDIOGRAM REPORT: CPT | Mod: ,,, | Performed by: INTERNAL MEDICINE

## 2025-02-10 PROCEDURE — 99213 OFFICE O/P EST LOW 20 MIN: CPT | Mod: PBBFAC | Performed by: INTERNAL MEDICINE

## 2025-02-10 PROCEDURE — 99999 PR PBB SHADOW E&M-EST. PATIENT-LVL III: CPT | Mod: PBBFAC,,, | Performed by: INTERNAL MEDICINE

## 2025-02-10 PROCEDURE — 99205 OFFICE O/P NEW HI 60 MIN: CPT | Mod: S$PBB,,, | Performed by: INTERNAL MEDICINE

## 2025-02-10 NOTE — PROGRESS NOTES
Subjective   Patient ID:  Darnell Ruelas Jr. is a 75 y.o. male who presents for evaluation of Loss of Consciousness    PT REFERRED BY DR. KAMALJIT THOMPSON      HPI  PT referred for near syncope  Pt has hyperlipidemia, RBBB, LAFB.  Nonsmoker.  Per PCP recent note:  3 episodes pre-syncope over last several years.   One episode year feeling like he might faint, attributed to not eating ?.  Exercises and feels good.  Seems in good health at his age.  Denies CP.  No CHF sxs.  No exertional dizziness.  Ecg today 2/10/25 personally reviewed: NSR, LAFB, RBBB.  Ecg Aug 2020: NSR,  nonspecific ST-T abnl.  FANI 2019 mild abnormal R LE.  No claudication sxs.        Past Medical History:   Diagnosis Date    Chronic hepatitis C     Hypercholesteremia     Kidney stones     Malignant neoplasm of prostate 2/14/2013       Current Outpatient Medications:     benzonatate (TESSALON) 200 MG capsule, Take 1 capsule (200 mg total) by mouth 3 (three) times daily as needed for Cough. (Patient not taking: Reported on 1/24/2025), Disp: 30 capsule, Rfl: 1    cetirizine (ZYRTEC) 10 MG tablet, Take 1 tablet (10 mg total) by mouth once daily., Disp: 90 tablet, Rfl: 3    ERGOCALCIFEROL, VITAMIN D2, (VITAMIN D ORAL), Take 1 tablet by mouth once daily.  (Patient not taking: Reported on 1/24/2025), Disp: , Rfl:     esomeprazole (NEXIUM) 20 MG capsule, Take 1 capsule (20 mg total) by mouth before breakfast. (Patient not taking: Reported on 1/24/2025), Disp: 30 capsule, Rfl: 11    meloxicam (MOBIC) 15 MG tablet, TAKE 1 TABLET DAILY AS NEEDED (Patient not taking: Reported on 1/24/2025), Disp: 30 tablet, Rfl: 3    multivitamin capsule, Take 1 capsule by mouth once daily., Disp: , Rfl:     simvastatin (ZOCOR) 20 MG tablet, Take 1 tablet (20 mg total) by mouth every evening., Disp: 90 tablet, Rfl: 3    tadalafiL (CIALIS) 20 MG Tab, Take 1 tablet (20 mg total) by mouth daily as needed., Disp: 90 tablet, Rfl: 2      Review of Systems   Constitutional:  "Negative.   HENT: Negative.     Eyes: Negative.    Cardiovascular:  Positive for near-syncope.   Respiratory: Negative.     Endocrine: Negative.    Hematologic/Lymphatic: Negative.    Skin: Negative.    Musculoskeletal: Negative.    Gastrointestinal: Negative.    Genitourinary: Negative.    Neurological: Negative.    Psychiatric/Behavioral: Negative.     Allergic/Immunologic: Negative.          /82 (BP Location: Left arm, Patient Position: Sitting)   Pulse 65   Ht 6' 1" (1.854 m)   Wt 88.6 kg (195 lb 5.2 oz)   SpO2 98%   BMI 25.77 kg/m²     Wt Readings from Last 3 Encounters:   02/10/25 88.6 kg (195 lb 5.2 oz)   01/24/25 87.5 kg (193 lb 0.2 oz)   08/16/24 86.8 kg (191 lb 5.8 oz)     Temp Readings from Last 3 Encounters:   01/24/25 97.4 °F (36.3 °C) (Tympanic)   08/16/24 99.2 °F (37.3 °C) (Tympanic)   07/19/24 97.6 °F (36.4 °C) (Tympanic)     BP Readings from Last 3 Encounters:   02/10/25 138/82   01/24/25 124/76   08/16/24 116/82     Pulse Readings from Last 3 Encounters:   02/10/25 65   01/24/25 79   08/16/24 95          Objective     Physical Exam  Vitals and nursing note reviewed.   Constitutional:       Appearance: He is well-developed.   HENT:      Head: Normocephalic.   Neck:      Thyroid: No thyromegaly.      Vascular: Normal carotid pulses. No carotid bruit, hepatojugular reflux or JVD.   Cardiovascular:      Rate and Rhythm: Normal rate and regular rhythm.      Chest Wall: PMI is not displaced.      Pulses:           Radial pulses are 2+ on the right side and 2+ on the left side.      Heart sounds: S1 normal and S2 normal. Heart sounds not distant. No midsystolic click and no opening snap. No murmur heard.     No friction rub. No S3 or S4 sounds.   Pulmonary:      Effort: Pulmonary effort is normal.      Breath sounds: Normal breath sounds. No wheezing or rales.   Abdominal:      General: Bowel sounds are normal. There is no distension or abdominal bruit.      Palpations: Abdomen is soft. There " "is no mass.      Tenderness: There is no abdominal tenderness.   Musculoskeletal:      Cervical back: Normal range of motion and neck supple.   Skin:     General: Skin is warm.   Neurological:      Mental Status: He is alert and oriented to person, place, and time.   Psychiatric:         Behavior: Behavior normal.       I have reviewed all pertinent labs and cardiac studies.        Chemistry        Component Value Date/Time     01/31/2025 1524    K 4.2 01/31/2025 1524     01/31/2025 1524    CO2 25 01/31/2025 1524    BUN 12 01/31/2025 1524    CREATININE 1.1 01/31/2025 1524    GLU 84 01/31/2025 1524        Component Value Date/Time    CALCIUM 9.1 01/31/2025 1524    ALKPHOS 75 01/31/2025 1524    AST 24 01/31/2025 1524    ALT 19 01/31/2025 1524    BILITOT 0.4 01/31/2025 1524    ESTGFRAFRICA >60.0 11/09/2021 1531    EGFRNONAA >60.0 11/09/2021 1531        Lab Results   Component Value Date    WBC 6.52 01/31/2025    HGB 15.9 01/31/2025    HCT 48.6 01/31/2025    MCV 89 01/31/2025     01/31/2025       No results found for: "LABA1C", "HGBA1C"    Lab Results   Component Value Date    CHOL 162 01/31/2025    CHOL 161 01/19/2024    CHOL 186 01/13/2023     Lab Results   Component Value Date    HDL 44 01/31/2025    HDL 48 01/19/2024    HDL 51 01/13/2023     Lab Results   Component Value Date    LDLCALC 96.0 01/31/2025    LDLCALC 100.4 01/19/2024    LDLCALC 121.0 01/13/2023     Lab Results   Component Value Date    TRIG 110 01/31/2025    TRIG 63 01/19/2024    TRIG 70 01/13/2023       Lab Results   Component Value Date    CHOLHDL 27.2 01/31/2025    CHOLHDL 29.8 01/19/2024    CHOLHDL 27.4 01/13/2023          Assessment and Plan     1. Near syncope    2. Hypercholesteremia    3. Abnormal ECG    4. PAD (peripheral artery disease)    5. Pre-syncope        Plan:    Infrequent near syncopal episodes, approx 1 event/year.  Has conduction disease noted on ecg today w RBBB/LAFB -- new from 2020.  Assess further.  1 week " Vital Holter.  Echocardiogram.  Stress test.  FANI for reevaluation of PAD.  Continue statin tx.  Cardiac diet.  Continue regular exercise.    PHONE REVIEW.    F/u yearly basis if CV tests ok; sooner if needed.        I have reviewed all pertinent labs and cardiac studies independently. Plans and recommendations have been formulated under my direct supervision. All questions answered and patient voiced understanding.

## 2025-02-20 RX ORDER — SIMVASTATIN 20 MG/1
20 TABLET, FILM COATED ORAL NIGHTLY
Qty: 90 TABLET | Refills: 3 | Status: SHIPPED | OUTPATIENT
Start: 2025-02-20

## 2025-02-20 RX ORDER — TADALAFIL 20 MG/1
20 TABLET ORAL DAILY PRN
Qty: 90 TABLET | Refills: 3 | Status: SHIPPED | OUTPATIENT
Start: 2025-02-20

## 2025-02-20 NOTE — TELEPHONE ENCOUNTER
No care due was identified.  Health [facility] Embedded Care Due Messages. Reference number: [id number].   2/20/2025 10:30:39 AM CST

## 2025-02-20 NOTE — TELEPHONE ENCOUNTER
----- Message from Ayush sent at 2/20/2025 10:37 AM CST -----  Contact: self  Type:  RX Refill RequestWho Called: Darnell Ruelas Jr.Refill or New Rx:refillRX Name and Strength:tadalafiL (CIALIS) 20 MG Tab, simvastatin (ZOCOR) 20 MG tablet, and he doesn't remember the last prescription he neededHow is the patient currently taking it? (ex. 1XDay):tadalafiL  as needed and simvastatin  1xdayIs this a 30 day or 90 day RX:90 day Preferred Pharmacy with phone number:[facility]  [place], [place] - [facility] [address]Nlltn: [phone] Fax: [phone] or Mail Order:mailOrdering Provider:Ja the patient rather a call back or a response via [facility]? Call [facility] Call Back Number:[phone] Information: the patient is calling to get his refill

## 2025-02-21 ENCOUNTER — OFFICE VISIT (OUTPATIENT)
Dept: FAMILY MEDICINE | Facility: CLINIC | Age: 75
End: 2025-02-21
Payer: MEDICARE

## 2025-02-21 VITALS
SYSTOLIC BLOOD PRESSURE: 130 MMHG | TEMPERATURE: 96 F | WEIGHT: 196.31 LBS | BODY MASS INDEX: 26.02 KG/M2 | HEART RATE: 85 BPM | OXYGEN SATURATION: 98 % | DIASTOLIC BLOOD PRESSURE: 82 MMHG | HEIGHT: 73 IN

## 2025-02-21 DIAGNOSIS — J30.9 ALLERGIC RHINITIS, UNSPECIFIED SEASONALITY, UNSPECIFIED TRIGGER: ICD-10-CM

## 2025-02-21 DIAGNOSIS — E78.00 HYPERCHOLESTEREMIA: ICD-10-CM

## 2025-02-21 DIAGNOSIS — Z85.46 HISTORY OF PROSTATE CANCER: ICD-10-CM

## 2025-02-21 DIAGNOSIS — R55 NEAR SYNCOPE: ICD-10-CM

## 2025-02-21 DIAGNOSIS — I73.9 PAD (PERIPHERAL ARTERY DISEASE): Primary | ICD-10-CM

## 2025-02-21 PROCEDURE — 99214 OFFICE O/P EST MOD 30 MIN: CPT | Mod: PBBFAC,PO | Performed by: INTERNAL MEDICINE

## 2025-02-21 NOTE — PROGRESS NOTES
Subjective:       Patient ID: Darnell Ruelas Jr. is a 75 y.o. male.    Chief Complaint: Follow-up (4 week) and Hyperlipidemia    Follow-up  Pertinent negatives include no abdominal pain, arthralgias, chest pain, chills, coughing, diaphoresis, fatigue, fever, headaches, joint swelling, myalgias, nausea, neck pain, numbness, rash, sore throat, vomiting or weakness.   Hyperlipidemia  Pertinent negatives include no chest pain, myalgias or shortness of breath.     Past Medical History:   Diagnosis Date    Chronic hepatitis C     Hypercholesteremia     Kidney stones     Malignant neoplasm of prostate 2/14/2013     Past Surgical History:   Procedure Laterality Date    CATARACT EXTRACTION EXTRACAPSULAR W/ INTRAOCULAR LENS IMPLANTATION Bilateral     CHOLECYSTECTOMY      KIDNEY STONE SURGERY  1988    TONSILLECTOMY       No family history on file.  Social History[1]  Review of Systems   Constitutional:  Negative for activity change, appetite change, chills, diaphoresis, fatigue, fever and unexpected weight change.   HENT:  Negative for drooling, ear discharge, ear pain, facial swelling, hearing loss, mouth sores, nosebleeds, postnasal drip, rhinorrhea, sinus pressure, sneezing, sore throat, tinnitus, trouble swallowing and voice change.    Eyes:  Negative for photophobia, redness and visual disturbance.   Respiratory:  Negative for apnea, cough, choking, chest tightness, shortness of breath and wheezing.    Cardiovascular:  Negative for chest pain, palpitations and leg swelling.   Gastrointestinal:  Negative for abdominal distention, abdominal pain, anal bleeding, blood in stool, constipation, diarrhea, nausea, rectal pain and vomiting.   Endocrine: Negative for cold intolerance, heat intolerance, polydipsia, polyphagia and polyuria.   Genitourinary:  Negative for difficulty urinating, dysuria, enuresis, flank pain, frequency, genital sores, hematuria and urgency.   Musculoskeletal:  Negative for arthralgias, back pain, gait  problem, joint swelling, myalgias, neck pain and neck stiffness.   Skin:  Negative for color change, pallor, rash and wound.   Allergic/Immunologic: Negative for food allergies and immunocompromised state.   Neurological:  Negative for dizziness, tremors, seizures, syncope, facial asymmetry, speech difficulty, weakness, light-headedness, numbness and headaches.   Hematological:  Negative for adenopathy. Does not bruise/bleed easily.   Psychiatric/Behavioral:  Negative for agitation, behavioral problems, confusion, decreased concentration, dysphoric mood, hallucinations, self-injury, sleep disturbance and suicidal ideas. The patient is not nervous/anxious and is not hyperactive.        Objective:      Physical Exam  Vitals and nursing note reviewed.   Constitutional:       General: He is not in acute distress.     Appearance: Normal appearance. He is well-developed. He is not diaphoretic.   HENT:      Head: Normocephalic and atraumatic.      Mouth/Throat:      Pharynx: No oropharyngeal exudate.   Eyes:      General: No scleral icterus.     Pupils: Pupils are equal, round, and reactive to light.   Neck:      Thyroid: No thyromegaly.      Vascular: No carotid bruit or JVD.      Trachea: No tracheal deviation.   Cardiovascular:      Rate and Rhythm: Normal rate and regular rhythm.      Heart sounds: Normal heart sounds.   Pulmonary:      Effort: Pulmonary effort is normal. No respiratory distress.      Breath sounds: Normal breath sounds. No wheezing or rales.   Chest:      Chest wall: No tenderness.   Abdominal:      General: Bowel sounds are normal. There is no distension.      Palpations: Abdomen is soft.      Tenderness: There is no abdominal tenderness. There is no guarding or rebound.   Musculoskeletal:         General: No tenderness. Normal range of motion.      Cervical back: Normal range of motion and neck supple.   Lymphadenopathy:      Cervical: No cervical adenopathy.   Skin:     General: Skin is warm and  dry.      Coloration: Skin is not pale.      Findings: No erythema or rash.   Neurological:      Mental Status: He is alert and oriented to person, place, and time.      Cranial Nerves: No cranial nerve deficit.      Coordination: Coordination normal.   Psychiatric:         Behavior: Behavior normal.         Thought Content: Thought content normal.         Judgment: Judgment normal.         CMP  Sodium   Date Value Ref Range Status   01/31/2025 144 136 - 145 mmol/L Final     Potassium   Date Value Ref Range Status   01/31/2025 4.2 3.5 - 5.1 mmol/L Final     Chloride   Date Value Ref Range Status   01/31/2025 107 95 - 110 mmol/L Final     CO2   Date Value Ref Range Status   01/31/2025 25 23 - 29 mmol/L Final     Glucose   Date Value Ref Range Status   01/31/2025 84 70 - 110 mg/dL Final     BUN   Date Value Ref Range Status   01/31/2025 12 8 - 23 mg/dL Final     Creatinine   Date Value Ref Range Status   01/31/2025 1.1 0.5 - 1.4 mg/dL Final     Calcium   Date Value Ref Range Status   01/31/2025 9.1 8.7 - 10.5 mg/dL Final     Total Protein   Date Value Ref Range Status   01/31/2025 7.3 6.0 - 8.4 g/dL Final     Albumin   Date Value Ref Range Status   01/31/2025 3.9 3.5 - 5.2 g/dL Final     Total Bilirubin   Date Value Ref Range Status   01/31/2025 0.4 0.1 - 1.0 mg/dL Final     Comment:     For infants and newborns, interpretation of results should be based  on gestational age, weight and in agreement with clinical  observations.    Premature Infant recommended reference ranges:  Up to 24 hours.............<8.0 mg/dL  Up to 48 hours............<12.0 mg/dL  3-5 days..................<15.0 mg/dL  6-29 days.................<15.0 mg/dL       Alkaline Phosphatase   Date Value Ref Range Status   01/31/2025 75 40 - 150 U/L Final     AST   Date Value Ref Range Status   01/31/2025 24 10 - 40 U/L Final     ALT   Date Value Ref Range Status   01/31/2025 19 10 - 44 U/L Final     Anion Gap   Date Value Ref Range Status   01/31/2025  "12 8 - 16 mmol/L Final     eGFR if    Date Value Ref Range Status   11/09/2021 >60.0 >60 mL/min/1.73 m^2 Final     eGFR if non    Date Value Ref Range Status   11/09/2021 >60.0 >60 mL/min/1.73 m^2 Final     Comment:     Calculation used to obtain the estimated glomerular filtration  rate (eGFR) is the CKD-EPI equation.        Lab Results   Component Value Date    WBC 6.52 01/31/2025    HGB 15.9 01/31/2025    HCT 48.6 01/31/2025    MCV 89 01/31/2025     01/31/2025     Lab Results   Component Value Date    CHOL 162 01/31/2025     Lab Results   Component Value Date    HDL 44 01/31/2025     Lab Results   Component Value Date    LDLCALC 96.0 01/31/2025     Lab Results   Component Value Date    TRIG 110 01/31/2025     Lab Results   Component Value Date    CHOLHDL 27.2 01/31/2025     Lab Results   Component Value Date    TSH 1.370 01/31/2025     No results found for: "LABA1C", "HGBA1C"  Assessment:       1. PAD (peripheral artery disease)    2. Hypercholesteremia    3. History of prostate cancer    4. Allergic rhinitis, unspecified seasonality, unspecified trigger    5. Near syncope        Plan:   PAD (peripheral artery disease)    Hypercholesteremia-------statin    History of prostate cancer    Allergic rhinitis, unspecified seasonality, unspecified trigger    Near syncope---------complete cards w/u---------    Stable-------continue meds------------as above-------------f/u 6 months-----------declines colon-states will do fitkit----------         [1]   Social History  Socioeconomic History    Marital status:    Tobacco Use    Smoking status: Former     Types: Cigarettes    Smokeless tobacco: Former   Substance and Sexual Activity    Alcohol use: Yes     Alcohol/week: 0.0 standard drinks of alcohol     Comment: rarely    Drug use: No    Sexual activity: Yes     Social Drivers of Health     Financial Resource Strain: Low Risk  (2/18/2025)    Overall Financial Resource Strain " (CARDIA)     Difficulty of Paying Living Expenses: Not hard at all   Food Insecurity: No Food Insecurity (2/18/2025)    Hunger Vital Sign     Worried About Running Out of Food in the Last Year: Never true     Ran Out of Food in the Last Year: Never true   Transportation Needs: No Transportation Needs (2/18/2025)    PRAPARE - Transportation     Lack of Transportation (Medical): No     Lack of Transportation (Non-Medical): No   Physical Activity: Sufficiently Active (2/18/2025)    Exercise Vital Sign     Days of Exercise per Week: 5 days     Minutes of Exercise per Session: 120 min   Stress: No Stress Concern Present (2/18/2025)    [employer] [place] [employer] - Occupational Stress Questionnaire     Feeling of Stress : Not at all   Housing Stability: Low Risk  (2/18/2025)    Housing Stability Vital Sign     Unable to Pay for Housing in the Last Year: No     Number of Times Moved in the Last Year: 0     Homeless in the Last Year: No

## 2025-02-22 DIAGNOSIS — Z00.00 ENCOUNTER FOR MEDICARE ANNUAL WELLNESS EXAM: ICD-10-CM

## 2025-02-25 ENCOUNTER — TELEPHONE (OUTPATIENT)
Dept: CARDIOLOGY | Facility: HOSPITAL | Age: 75
End: 2025-02-25
Payer: MEDICARE

## 2025-02-26 ENCOUNTER — HOSPITAL ENCOUNTER (OUTPATIENT)
Dept: CARDIOLOGY | Facility: HOSPITAL | Age: 75
Discharge: HOME | End: 2025-02-26
Attending: INTERNAL MEDICINE
Payer: MEDICARE

## 2025-02-26 ENCOUNTER — HOSPITAL ENCOUNTER (OUTPATIENT)
Dept: RADIOLOGY | Facility: HOSPITAL | Age: 75
Discharge: HOME | End: 2025-02-26
Attending: INTERNAL MEDICINE
Payer: MEDICARE

## 2025-02-26 ENCOUNTER — RESULTS FOLLOW-UP (OUTPATIENT)
Dept: CARDIOLOGY | Facility: CLINIC | Age: 75
End: 2025-02-26

## 2025-02-26 VITALS
WEIGHT: 196 LBS | SYSTOLIC BLOOD PRESSURE: 138 MMHG | BODY MASS INDEX: 25.98 KG/M2 | DIASTOLIC BLOOD PRESSURE: 80 MMHG | HEIGHT: 73 IN

## 2025-02-26 VITALS
SYSTOLIC BLOOD PRESSURE: 138 MMHG | WEIGHT: 196 LBS | HEIGHT: 73 IN | BODY MASS INDEX: 25.98 KG/M2 | DIASTOLIC BLOOD PRESSURE: 82 MMHG

## 2025-02-26 DIAGNOSIS — I73.9 PAD (PERIPHERAL ARTERY DISEASE): ICD-10-CM

## 2025-02-26 DIAGNOSIS — R94.31 ABNORMAL ECG: ICD-10-CM

## 2025-02-26 DIAGNOSIS — R55 NEAR SYNCOPE: ICD-10-CM

## 2025-02-26 DIAGNOSIS — R55 PRE-SYNCOPE: ICD-10-CM

## 2025-02-26 DIAGNOSIS — I45.2 RBBB (RIGHT BUNDLE BRANCH BLOCK WITH LEFT ANTERIOR FASCICULAR BLOCK): ICD-10-CM

## 2025-02-26 LAB
AORTIC ROOT ANNULUS: 2.7 CM
AV INDEX (PROSTH): 0.56
AV MEAN GRADIENT: 6 MMHG
AV PEAK GRADIENT: 13 MMHG
AV VALVE AREA BY VELOCITY RATIO: 1.6 CM²
AV VALVE AREA: 1.8 CM²
AV VELOCITY RATIO: 0.5
BSA FOR ECHO PROCEDURE: 2.14 M2
CV ECHO LV RWT: 0.53 CM
DOP CALC AO PEAK VEL: 1.8 M/S
DOP CALC AO VTI: 33.8 CM
DOP CALC LVOT AREA: 3.1 CM2
DOP CALC LVOT DIAMETER: 2 CM
DOP CALC LVOT PEAK VEL: 0.9 M/S
DOP CALC LVOT STROKE VOLUME: 59.7 CM3
DOP CALC RVOT PEAK VEL: 0.74 M/S
DOP CALC RVOT VTI: 16.7 CM
DOP CALCLVOT PEAK VEL VTI: 19 CM
E WAVE DECELERATION TIME: 206 MSEC
E/A RATIO: 0.81
E/E' RATIO: 10 M/S
ECHO LV POSTERIOR WALL: 1 CM (ref 0.6–1.1)
EJECTION FRACTION: 60 %
FRACTIONAL SHORTENING: 28.9 % (ref 28–44)
INTERVENTRICULAR SEPTUM: 1.1 CM (ref 0.6–1.1)
IVRT: 80 MSEC
LA MAJOR: 4.2 CM
LA MINOR: 4 CM
LA WIDTH: 2.6 CM
LEFT ABI: 1.18
LEFT ARM BP: 138 MMHG
LEFT ATRIUM AREA SYSTOLIC (APICAL 2 CHAMBER): 18.36 CM2
LEFT ATRIUM AREA SYSTOLIC (APICAL 4 CHAMBER): 13.18 CM2
LEFT ATRIUM SIZE: 2.9 CM
LEFT ATRIUM VOLUME INDEX MOD: 19 ML/M2
LEFT ATRIUM VOLUME INDEX: 12 ML/M2
LEFT ATRIUM VOLUME MOD: 40 ML
LEFT ATRIUM VOLUME: 26 CM3
LEFT DORSALIS PEDIS: 163 MMHG
LEFT INTERNAL DIMENSION IN SYSTOLE: 2.7 CM (ref 2.1–4)
LEFT POSTERIOR TIBIAL: 163 MMHG
LEFT TBI: 0.66
LEFT TOE PRESSURE: 91 MMHG
LEFT VENTRICLE DIASTOLIC VOLUME INDEX: 29.11 ML/M2
LEFT VENTRICLE DIASTOLIC VOLUME: 62 ML
LEFT VENTRICLE END SYSTOLIC VOLUME APICAL 2 CHAMBER: 51.58 ML
LEFT VENTRICLE END SYSTOLIC VOLUME APICAL 4 CHAMBER: 28.47 ML
LEFT VENTRICLE MASS INDEX: 59.1 G/M2
LEFT VENTRICLE SYSTOLIC VOLUME INDEX: 12.2 ML/M2
LEFT VENTRICLE SYSTOLIC VOLUME: 26 ML
LEFT VENTRICULAR INTERNAL DIMENSION IN DIASTOLE: 3.8 CM (ref 3.5–6)
LEFT VENTRICULAR MASS: 125.8 G
LV LATERAL E/E' RATIO: 9.1 M/S
LV SEPTAL E/E' RATIO: 12.2 M/S
LVED V (TEICH): 62.44 ML
LVES V (TEICH): 25.75 ML
LVOT MG: 2.12 MMHG
LVOT MV: 0.68 CM/S
MV PEAK A VEL: 0.9 M/S
MV PEAK E VEL: 0.73 M/S
OHS CV RV/LV RATIO: 0.61 CM
PISA TR MAX VEL: 3 M/S
PV MEAN GRADIENT: 1 MMHG
PV MV: 0.83 M/S
PV PEAK GRADIENT: 5 MMHG
PV PEAK VELOCITY: 1.07 M/S
RA MAJOR: 4.04 CM
RA PRESSURE ESTIMATED: 3 MMHG
RA WIDTH: 2.63 CM
RIGHT ABI: 1.13
RIGHT ARM BP: 120 MMHG
RIGHT DORSALIS PEDIS: 126 MMHG
RIGHT POSTERIOR TIBIAL: 156 MMHG
RIGHT TBI: 0.81
RIGHT TOE PRESSURE: 112 MMHG
RIGHT VENTRICLE DIASTOLIC BASEL DIMENSION: 2.3 CM
RIGHT VENTRICULAR END-DIASTOLIC DIMENSION: 2.3 CM
RV TB RVSP: 6 MMHG
SINUS: 2.56 CM
STJ: 2.88 CM
TDI LATERAL: 0.08 M/S
TDI SEPTAL: 0.06 M/S
TDI: 0.07 M/S
TR MAX PG: 36 MMHG
TV REST PULMONARY ARTERY PRESSURE: 39 MMHG
Z-SCORE OF LEFT VENTRICULAR DIMENSION IN END DIASTOLE: -5.8
Z-SCORE OF LEFT VENTRICULAR DIMENSION IN END SYSTOLE: -3.38

## 2025-02-26 PROCEDURE — A9502 TC99M TETROFOSMIN: HCPCS | Performed by: INTERNAL MEDICINE

## 2025-02-26 PROCEDURE — 93017 CV STRESS TEST TRACING ONLY: CPT

## 2025-02-26 PROCEDURE — 93306 TTE W/DOPPLER COMPLETE: CPT | Mod: 26,,, | Performed by: INTERNAL MEDICINE

## 2025-02-26 PROCEDURE — 93922 UPR/L XTREMITY ART 2 LEVELS: CPT

## 2025-02-26 PROCEDURE — 78452 HT MUSCLE IMAGE SPECT MULT: CPT

## 2025-02-26 PROCEDURE — 93306 TTE W/DOPPLER COMPLETE: CPT

## 2025-02-26 RX ADMIN — TETROFOSMIN 28.8 MILLICURIE: 1.38 INJECTION, POWDER, LYOPHILIZED, FOR SOLUTION INTRAVENOUS at 11:02

## 2025-02-26 RX ADMIN — TETROFOSMIN 9.9 MILLICURIE: 1.38 INJECTION, POWDER, LYOPHILIZED, FOR SOLUTION INTRAVENOUS at 08:02

## 2025-02-26 NOTE — NURSING NOTE
Pt. Presented to clinic for exercise nuclear stress test. After IV insertion pt. Experienced near syncope. 0844 BP on left arm 140/78. 0847 Blood glucose 102. At this time Maude MAC present to assess patient. Pt states he feels better and this is how feels with near syncope at home. Pt. Wishes to continue with exercise, educated patient that if near syncope occurs during exercise we will convert to chemical stress test. Pt. Verbalized understanding. Pt. Asymptomatic  at this time.

## 2025-02-27 LAB
CV STRESS BASE HR: 70 BPM
DIASTOLIC BLOOD PRESSURE: 90 MMHG
NUC REST EJECTION FRACTION: 64
NUC STRESS EJECTION FRACTION: 63 %
OHS CV CPX 85 PERCENT MAX PREDICTED HEART RATE MALE: 122
OHS CV CPX ESTIMATED METS: 9
OHS CV CPX MAX PREDICTED HEART RATE: 143
OHS CV CPX PATIENT IS FEMALE: 0
OHS CV CPX PATIENT IS MALE: 1
OHS CV CPX PEAK DIASTOLIC BLOOD PRESSURE: 103 MMHG
OHS CV CPX PEAK HEAR RATE: 131 BPM
OHS CV CPX PEAK RATE PRESSURE PRODUCT: NORMAL
OHS CV CPX PEAK SYSTOLIC BLOOD PRESSURE: 166 MMHG
OHS CV CPX PERCENT MAX PREDICTED HEART RATE ACHIEVED: 92
OHS CV CPX RATE PRESSURE PRODUCT PRESENTING: NORMAL
OHS CV INITIAL DOSE: 9.9 MCG/KG/MIN
OHS CV PEAK DOSE: 28.8 MCG/KG/MIN
STRESS ECHO POST EXERCISE DUR MIN: 7 MINUTES
SYSTOLIC BLOOD PRESSURE: 148 MMHG

## 2025-03-07 RX ORDER — SIMVASTATIN 20 MG/1
20 TABLET, FILM COATED ORAL NIGHTLY
Qty: 90 TABLET | Refills: 3 | Status: SHIPPED | OUTPATIENT
Start: 2025-03-07

## 2025-03-07 NOTE — TELEPHONE ENCOUNTER
----- Message from Lamar sent at 3/7/2025 11:25 AM CST -----  Please refill the medication(s) listed below.Please call the patient when the prescription(s) is ready for  at this phone number  Medication #1simvastatin (ZOCOR) 20 MG tablet     Medication #2Medication #3  Preferred Pharmacy:[id number] [address] [facility] [address] [facility] [address]Daxmp: [phone] Fax: [phone]  Would the patient rather a call back or a response via [facility]? Call [facility] Call Back Number:Telephone Information:Mobile          [phone] Additional Information: [facility] and would like to get them sent to pharmacy above . Please advise thank you

## 2025-03-07 NOTE — TELEPHONE ENCOUNTER
No care due was identified.  Health [facility] Embedded Care Due Messages. Reference number: [id number].   3/07/2025 11:42:13 AM CST

## 2025-03-12 RX ORDER — MELOXICAM 15 MG/1
TABLET ORAL
Qty: 30 TABLET | Refills: 3 | Status: SHIPPED | OUTPATIENT
Start: 2025-03-12

## 2025-03-12 RX ORDER — CETIRIZINE HYDROCHLORIDE 10 MG/1
10 TABLET ORAL DAILY
Qty: 90 TABLET | Refills: 3 | Status: SHIPPED | OUTPATIENT
Start: 2025-03-12

## 2025-03-12 RX ORDER — TADALAFIL 20 MG/1
20 TABLET ORAL DAILY PRN
Qty: 90 TABLET | Refills: 3 | Status: SHIPPED | OUTPATIENT
Start: 2025-03-12

## 2025-03-12 NOTE — TELEPHONE ENCOUNTER
No care due was identified.  [facility] Embedded Care Due Messages. Reference number: [id number].   3/12/2025 2:13:33 PM CDT

## 2025-03-12 NOTE — TELEPHONE ENCOUNTER
----- Message from Med Assistant Del [place] sent at 3/12/2025  2:04 PM CDT -----  Contact: Darnell  Type:  Needing Return CallWho Called: Erlin Call Back For: Wanting tadalafil, Meloxicam and Cetirizine all sent to [facility] as he is wanting to see if they are cheaper there like other medication wasWould the patient rather a call back or a response via [facility]? [facility] Call Back Number: [phone] Information:

## 2025-03-13 ENCOUNTER — TELEPHONE (OUTPATIENT)
Dept: CARDIOLOGY | Facility: CLINIC | Age: 75
End: 2025-03-13
Payer: MEDICARE

## 2025-03-13 NOTE — TELEPHONE ENCOUNTER
LVM to call clinic to review-FANI results were fine and do not require any change in treatment.   echo results were fine and do not require any change in treatment.   No problems noted on Holter.    Left a [employer] message     ----- Message from Peter Peace MD sent at 3/13/2025  8:15 AM CDT -----  Please contact the patient and let them know that their echo results were fine and do not require any change in treatment.  ----- Message -----  From: [facility], System Message  Sent: 3/12/2025  11:06 PM CDT  To: Peter Peace MD  Subject: Notification of Unviewed Test Results            BETITO ALVARADO JR. has not viewed the following results:  - ECHO (CUPID ONLY)

## 2025-03-14 ENCOUNTER — PATIENT MESSAGE (OUTPATIENT)
Dept: CARDIOLOGY | Facility: CLINIC | Age: 75
End: 2025-03-14
Payer: MEDICARE

## 2025-03-26 ENCOUNTER — TELEPHONE (OUTPATIENT)
Dept: CARDIOLOGY | Facility: CLINIC | Age: 75
End: 2025-03-26
Payer: MEDICARE

## 2025-03-26 NOTE — TELEPHONE ENCOUNTER
Contacted PT and reviewed results and recommendations-results were fine and do not require any change in treatment. PT stated verbal understanding        ----- Message from Peter Peace MD sent at 3/26/2025  8:15 AM CDT -----  Please contact the patient and let them know that their results were fine and do not require any change in treatment.  ----- Message -----  From: [facility], System Message  Sent: 3/25/2025  11:06 PM CDT  To: Peter Peace MD  Subject: Notification of Unviewed Test Results            BETITO ALVARADO JR. has not viewed the following results:  - CV CARDIAC MONITOR - 3-15 DAY ADULT (CUPID ONLY)

## 2025-05-21 ENCOUNTER — OFFICE VISIT (OUTPATIENT)
Dept: FAMILY MEDICINE | Facility: CLINIC | Age: 75
End: 2025-05-21
Payer: MEDICARE

## 2025-05-21 VITALS
OXYGEN SATURATION: 98 % | SYSTOLIC BLOOD PRESSURE: 130 MMHG | BODY MASS INDEX: 25.48 KG/M2 | HEART RATE: 70 BPM | TEMPERATURE: 98 F | DIASTOLIC BLOOD PRESSURE: 82 MMHG | HEIGHT: 73 IN | WEIGHT: 192.25 LBS

## 2025-05-21 DIAGNOSIS — R21 RASH OF NECK: ICD-10-CM

## 2025-05-21 DIAGNOSIS — L29.1 SCROTAL ITCHING: Primary | ICD-10-CM

## 2025-05-21 PROCEDURE — 99214 OFFICE O/P EST MOD 30 MIN: CPT | Mod: PBBFAC,PO | Performed by: INTERNAL MEDICINE

## 2025-05-21 PROCEDURE — 99999 PR PBB SHADOW E&M-EST. PATIENT-LVL IV: CPT | Mod: PBBFAC,,, | Performed by: INTERNAL MEDICINE

## 2025-05-21 PROCEDURE — 99213 OFFICE O/P EST LOW 20 MIN: CPT | Mod: S$PBB,,, | Performed by: INTERNAL MEDICINE

## 2025-05-21 RX ORDER — CLOTRIMAZOLE AND BETAMETHASONE DIPROPIONATE 10; .64 MG/G; MG/G
CREAM TOPICAL 2 TIMES DAILY
Qty: 45 G | Refills: 2 | Status: SHIPPED | OUTPATIENT
Start: 2025-05-21

## 2025-05-21 NOTE — PROGRESS NOTES
Subjective:       Patient ID: Darnell Ruelas Jr. is a 75 y.o. male.    Chief Complaint: Itching (Neck and groining x 1 month)    1-2 months itching around groin, scrotum and posterior neck-------------rash-    Itching  Pertinent negatives include no abdominal pain, arthralgias, chest pain, chills, coughing, diaphoresis, fatigue, fever, headaches, joint swelling, myalgias, nausea, neck pain, numbness, sore throat, vomiting or weakness.     Past Medical History:   Diagnosis Date    Chronic hepatitis C     Hypercholesteremia     Kidney stones     Malignant neoplasm of prostate 2/14/2013     Past Surgical History:   Procedure Laterality Date    CATARACT EXTRACTION EXTRACAPSULAR W/ INTRAOCULAR LENS IMPLANTATION Bilateral     CHOLECYSTECTOMY      KIDNEY STONE SURGERY  1988    TONSILLECTOMY       No family history on file.  Social History[1]  Review of Systems   Constitutional:  Negative for activity change, appetite change, chills, diaphoresis, fatigue, fever and unexpected weight change.   HENT:  Negative for drooling, ear discharge, ear pain, facial swelling, hearing loss, mouth sores, nosebleeds, postnasal drip, rhinorrhea, sinus pressure, sneezing, sore throat, tinnitus, trouble swallowing and voice change.    Eyes:  Negative for photophobia, redness and visual disturbance.   Respiratory:  Negative for apnea, cough, choking, chest tightness, shortness of breath and wheezing.    Cardiovascular:  Negative for chest pain, palpitations and leg swelling.   Gastrointestinal:  Negative for abdominal distention, abdominal pain, anal bleeding, blood in stool, constipation, diarrhea, nausea, rectal pain and vomiting.   Endocrine: Negative for cold intolerance, heat intolerance, polydipsia, polyphagia and polyuria.   Genitourinary:  Negative for difficulty urinating, dysuria, enuresis, flank pain, frequency, genital sores, hematuria and urgency.   Musculoskeletal:  Negative for arthralgias, back pain, gait problem, joint swelling,  myalgias, neck pain and neck stiffness.   Skin:  Positive for itching. Negative for color change, pallor and wound.   Allergic/Immunologic: Negative for food allergies and immunocompromised state.   Neurological:  Negative for dizziness, tremors, seizures, syncope, facial asymmetry, speech difficulty, weakness, light-headedness, numbness and headaches.   Hematological:  Negative for adenopathy. Does not bruise/bleed easily.   Psychiatric/Behavioral:  Negative for agitation, behavioral problems, confusion, decreased concentration, dysphoric mood, hallucinations, self-injury, sleep disturbance and suicidal ideas. The patient is not nervous/anxious and is not hyperactive.        Objective:      Physical Exam  Vitals and nursing note reviewed.   Constitutional:       General: He is not in acute distress.     Appearance: Normal appearance. He is well-developed. He is not diaphoretic.   HENT:      Head: Normocephalic and atraumatic.      Mouth/Throat:      Pharynx: No oropharyngeal exudate.   Eyes:      General: No scleral icterus.     Pupils: Pupils are equal, round, and reactive to light.   Neck:      Thyroid: No thyromegaly.      Vascular: No carotid bruit or JVD.      Trachea: No tracheal deviation.   Cardiovascular:      Rate and Rhythm: Normal rate and regular rhythm.      Heart sounds: Normal heart sounds.   Pulmonary:      Effort: Pulmonary effort is normal. No respiratory distress.      Breath sounds: Normal breath sounds. No wheezing or rales.   Chest:      Chest wall: No tenderness.   Abdominal:      General: Bowel sounds are normal. There is no distension.      Palpations: Abdomen is soft.      Tenderness: There is no abdominal tenderness. There is no guarding or rebound.   Musculoskeletal:         General: No tenderness. Normal range of motion.      Cervical back: Normal range of motion and neck supple.   Lymphadenopathy:      Cervical: No cervical adenopathy.   Skin:     General: Skin is warm and dry.       Coloration: Skin is not pale.      Findings: Rash present. No erythema.   Neurological:      Mental Status: He is alert and oriented to person, place, and time.      Cranial Nerves: No cranial nerve deficit.      Coordination: Coordination normal.   Psychiatric:         Behavior: Behavior normal.         Thought Content: Thought content normal.         Judgment: Judgment normal.         CMP  Sodium   Date Value Ref Range Status   01/31/2025 144 136 - 145 mmol/L Final     Potassium   Date Value Ref Range Status   01/31/2025 4.2 3.5 - 5.1 mmol/L Final     Chloride   Date Value Ref Range Status   01/31/2025 107 95 - 110 mmol/L Final     CO2   Date Value Ref Range Status   01/31/2025 25 23 - 29 mmol/L Final     Glucose   Date Value Ref Range Status   01/31/2025 84 70 - 110 mg/dL Final     BUN   Date Value Ref Range Status   01/31/2025 12 8 - 23 mg/dL Final     Creatinine   Date Value Ref Range Status   01/31/2025 1.1 0.5 - 1.4 mg/dL Final     Calcium   Date Value Ref Range Status   01/31/2025 9.1 8.7 - 10.5 mg/dL Final     Total Protein   Date Value Ref Range Status   01/31/2025 7.3 6.0 - 8.4 g/dL Final     Albumin   Date Value Ref Range Status   01/31/2025 3.9 3.5 - 5.2 g/dL Final     Total Bilirubin   Date Value Ref Range Status   01/31/2025 0.4 0.1 - 1.0 mg/dL Final     Comment:     For infants and newborns, interpretation of results should be based  on gestational age, weight and in agreement with clinical  observations.    Premature Infant recommended reference ranges:  Up to 24 hours.............<8.0 mg/dL  Up to 48 hours............<12.0 mg/dL  3-5 days..................<15.0 mg/dL  6-29 days.................<15.0 mg/dL       Alkaline Phosphatase   Date Value Ref Range Status   01/31/2025 75 40 - 150 U/L Final     AST   Date Value Ref Range Status   01/31/2025 24 10 - 40 U/L Final     ALT   Date Value Ref Range Status   01/31/2025 19 10 - 44 U/L Final     Anion Gap   Date Value Ref Range Status   01/31/2025 12 8  "- 16 mmol/L Final     eGFR if    Date Value Ref Range Status   11/09/2021 >60.0 >60 mL/min/1.73 m^2 Final     eGFR if non    Date Value Ref Range Status   11/09/2021 >60.0 >60 mL/min/1.73 m^2 Final     Comment:     Calculation used to obtain the estimated glomerular filtration  rate (eGFR) is the CKD-EPI equation.        Lab Results   Component Value Date    WBC 6.52 01/31/2025    HGB 15.9 01/31/2025    HCT 48.6 01/31/2025    MCV 89 01/31/2025     01/31/2025     Lab Results   Component Value Date    CHOL 162 01/31/2025     Lab Results   Component Value Date    HDL 44 01/31/2025     Lab Results   Component Value Date    LDLCALC 96.0 01/31/2025     Lab Results   Component Value Date    TRIG 110 01/31/2025     Lab Results   Component Value Date    CHOLHDL 27.2 01/31/2025     Lab Results   Component Value Date    TSH 1.370 01/31/2025     No results found for: "LABA1C", "HGBA1C"  Assessment:       1. Scrotal itching    2. Rash of neck        Plan:   Scrotal itching  -     Ambulatory referral/consult to Dermatology; Future; Expected date: 05/28/2025    Rash of neck  -     Ambulatory referral/consult to Dermatology; Future; Expected date: 05/28/2025    Other orders  -     clotrimazole-betamethasone 1-0.05% (LOTRISONE) cream; Apply topically 2 (two) times daily.  Dispense: 45 g; Refill: 2      As above--------------call if persists---       [1]   Social History  Socioeconomic History    Marital status:    Tobacco Use    Smoking status: Former     Types: Cigarettes    Smokeless tobacco: Former   Substance and Sexual Activity    Alcohol use: Yes     Alcohol/week: 0.0 standard drinks of alcohol     Comment: rarely    Drug use: No    Sexual activity: Yes     Social Drivers of Health     Financial Resource Strain: Low Risk  (2/18/2025)    Overall Financial Resource Strain (CARDIA)     Difficulty of Paying Living Expenses: Not hard at all   Food Insecurity: No Food Insecurity " (2/18/2025)    Hunger Vital Sign     Worried About Running Out of Food in the Last Year: Never true     Ran Out of Food in the Last Year: Never true   Transportation Needs: No Transportation Needs (2/18/2025)    PRAPARE - Transportation     Lack of Transportation (Medical): No     Lack of Transportation (Non-Medical): No   Physical Activity: Sufficiently Active (2/18/2025)    Exercise Vital Sign     Days of Exercise per Week: 5 days     Minutes of Exercise per Session: 120 min   Stress: No Stress Concern Present (2/18/2025)    [employer] - Occupational Stress Questionnaire     Feeling of Stress : Not at all   Housing Stability: Low Risk  (2/18/2025)    Housing Stability Vital Sign     Unable to Pay for Housing in the Last Year: No     Number of Times Moved in the Last Year: 0     Homeless in the Last Year: No

## 2025-05-22 ENCOUNTER — OFFICE VISIT (OUTPATIENT)
Dept: DERMATOLOGY | Facility: CLINIC | Age: 75
End: 2025-05-22
Payer: MEDICARE

## 2025-05-22 DIAGNOSIS — B35.6 TINEA CRURIS: ICD-10-CM

## 2025-05-22 PROCEDURE — 99203 OFFICE O/P NEW LOW 30 MIN: CPT | Mod: S$PBB,,, | Performed by: STUDENT IN AN ORGANIZED HEALTH CARE EDUCATION/TRAINING PROGRAM

## 2025-05-22 PROCEDURE — 99999 PR PBB SHADOW E&M-EST. PATIENT-LVL III: CPT | Mod: PBBFAC,,, | Performed by: STUDENT IN AN ORGANIZED HEALTH CARE EDUCATION/TRAINING PROGRAM

## 2025-05-22 PROCEDURE — 99213 OFFICE O/P EST LOW 20 MIN: CPT | Mod: PBBFAC | Performed by: STUDENT IN AN ORGANIZED HEALTH CARE EDUCATION/TRAINING PROGRAM

## 2025-05-22 RX ORDER — FLUCONAZOLE 200 MG/1
200 TABLET ORAL WEEKLY
Qty: 4 TABLET | Refills: 1 | Status: SHIPPED | OUTPATIENT
Start: 2025-05-22 | End: 2025-07-11

## 2025-05-22 NOTE — PROGRESS NOTES
Patient Information  Name: Darnell Ruelas Jr.  : 1950  MRN: [id number]     Referring Physician:  Dr. Godfrey   Primary Care Physician:  Dr. Godfrey, Beto DAVIS MD   Date of Visit: 2025      Subjective:       Darnell Ruelas Jr. is a 75 y.o. male who presents for   Chief Complaint   Patient presents with    Itching     In groin area and neck started about a couple of months ago       Patient is here with concern of: itching   Location: groin, neck  Duration: 2 months  Symptoms: itching  Prior treatments: Lotrimin cream    Patient was last seen:Visit date not found     Prior notes by myself reviewed.   Clinical documentation obtained by nursing staff reviewed.    Review of Systems   Skin:  Positive for itching and rash.        Objective:    Physical Exam   Constitutional: He appears well-developed and well-nourished. No distress.   Genitourinary:         Neurological: He is alert and oriented to person, place, and time. He is not disoriented.   Psychiatric: He has a normal mood and affect.   Skin:   Areas Examined (abnormalities noted in diagram):   Neck Inspection Performed  Genitals / Buttocks / Groin Inspection Performed         Diagram Legend     Erythematous scaling macule/papule c/w actinic keratosis       Vascular papule c/w angioma      Pigmented verrucoid papule/plaque c/w seborrheic keratosis      Yellow umbilicated papule c/w sebaceous hyperplasia      Irregularly shaped tan macule c/w lentigo     1-2 mm smooth white papules consistent with Milia      Movable subcutaneous cyst with punctum c/w epidermal inclusion cyst      Subcutaneous movable cyst c/w pilar cyst      Firm pink to brown papule c/w dermatofibroma      Pedunculated fleshy papule(s) c/w skin tag(s)      Evenly pigmented macule c/w junctional nevus     Mildly variegated pigmented, slightly irregular-bordered macule c/w mildly atypical nevus      Flesh colored to evenly pigmented papule c/w intradermal nevus       Pink pearly  papule/plaque c/w basal cell carcinoma      Erythematous hyperkeratotic cursted plaque c/w SCC      Surgical scar with no sign of skin cancer recurrence      Open and closed comedones      Inflammatory papules and pustules      Verrucoid papule consistent consistent with wart     Erythematous eczematous patches and plaques     Dystrophic onycholytic nail with subungual debris c/w onychomycosis     Umbilicated papule    Erythematous-base heme-crusted tan verrucoid plaque consistent with inflamed seborrheic keratosis     Erythematous Silvery Scaling Plaque c/w Psoriasis     See annotation      No images are attached to the encounter or orders placed in the encounter.    [] Data reviewed  [] Independent review of test  [] Management discussed with another provider    Assessment / Plan:        Tinea cruris  -     vs pruritus scroti  -     fluconazole (DIFLUCAN) 200 MG Tab; Take 1 tablet (200 mg total) by mouth once a week. for 8 doses  Dispense: 4 tablet; Refill: 1             LOS NUMBER AND COMPLEXITY OF PROBLEMS    COMPLEXITY OF DATA RISK TOTAL TIME (m)   10607  97683 [] 1 self-limited or minor problem [x] Minimal to none [] No treatment recommended or patient to monitor 15-29  10-19   32654  21798 Low  [] 2 or > self limited or minor problems  [] 1 stable chronic illness  [x] 1 acute, uncomplicated illness or injury Limited (2)  [] Prior external notes from each unique source  [] Review result of each unique test  [] Order each unique test []  Low  OTC medications, minor skin biopsy 30-44 20-29   21356  55461 Moderate  []  1 or > chronic illness with progression, exacerbation or SE of treatment  []  2 or more stable chronic illnesses  []  1 acute illness with systemic symptoms  []  1 acute complicated injury  []  1 undiagnosed new problem with uncertain prognosis Moderate (1/3 below)  []  3 or more data items        *Now includes assessment requiring independent historian  []  Independent interpretation of a  test  []  Discuss management/test with another provider Moderate  [x]  Prescription drug mgmt  []  Minor surgery with risk discussed  []  Mgmt limited by social determinates 45-59  30-39   51727  30574 High  []  1 or more chronic illness with severe exacerbation, progression or SE of treatment  []  1 acute or chronic illness/injury that poses a threat to life or bodily function Extensive (2/3 below)  []  3 or more data items        *Now includes assessment requiring independent historian.  []  Independent interpretation of a test  []  Discuss management/test with another provider High  []  Major surgery with risk discussed  []  Drug therapy requiring intensive monitoring for toxicity  []  Hospitalization  []  Decision for DNR 60-74  40-54      No follow-ups on file.    Kiara Robles MD, FAAD  [facility]

## 2025-06-27 ENCOUNTER — TELEPHONE (OUTPATIENT)
Dept: DERMATOLOGY | Facility: CLINIC | Age: 75
End: 2025-06-27
Payer: MEDICARE

## 2025-06-27 DIAGNOSIS — L29.9 PRURITUS: Primary | ICD-10-CM

## 2025-06-27 RX ORDER — HYDROXYZINE HYDROCHLORIDE 25 MG/1
25 TABLET, FILM COATED ORAL NIGHTLY
Qty: 30 TABLET | Refills: 2 | Status: SHIPPED | OUTPATIENT
Start: 2025-06-27

## 2025-06-27 NOTE — TELEPHONE ENCOUNTER
Returned call to pt. Message sent to Dr. Robles       Copied from CRM #[id number]. Topic: General Inquiry - Patient Advice  >> Jun 27, 2025 10:58 AM Laila wrote:  Patient is requesting a call back regarding medication not working. Please call back at [phone]

## 2025-06-27 NOTE — TELEPHONE ENCOUNTER
Returned call to pt to inform pt Dr. Robles called a rx into his pharmacy for him. No answer. Lvm.     ----- Message from Kiara Robles MD sent at 6/27/2025  3:57 PM CDT -----  Regarding: RE: Please Advise  Atarax sent in for patient.  ----- Message -----  From: Afia Herbert MA  Sent: 6/27/2025   3:50 PM CDT  To: Kiara Robles MD  Subject: Please Advise                                    Patient states you told him not to take what Dr. Godfrey prescribed.  Pt stated he has taken the rx you prescribed and the rash is gone but now he have excessive itching on the back of neck and scalp. Pt would like to know if there is anything you can prescribe him for the itching?

## 2025-06-30 ENCOUNTER — TELEPHONE (OUTPATIENT)
Dept: FAMILY MEDICINE | Facility: CLINIC | Age: 75
End: 2025-06-30
Payer: MEDICARE

## 2025-06-30 NOTE — TELEPHONE ENCOUNTER
Copied from CRM #[id number]. Topic: General Inquiry - Return Call  >> Jun 27, 2025 11:26 AM Honorio wrote:  Type: Patient Call Back    Who called:pt    What is the request in detail:pt would like a call back today.    Can the clinic reply by [facility]?no    Would the patient rather a call back or a response via [facility]? Call back    Best call back number:Telephone Information:  Mobile          [phone]    Additional Information:

## 2025-06-30 NOTE — TELEPHONE ENCOUNTER
Patient states head itching and Dr. Godfrey had ordered clotrimazole cream but when he saw Dermatologist was told to stop cream and take fluconazole pill weekly. Informed patient to reach back out to dermatologist with symptoms since she is caring for itching problems, voice understanding.

## 2025-07-01 ENCOUNTER — TELEPHONE (OUTPATIENT)
Dept: DERMATOLOGY | Facility: CLINIC | Age: 75
End: 2025-07-01
Payer: MEDICARE

## 2025-07-01 NOTE — TELEPHONE ENCOUNTER
Called pt to inform pt Dr. Robles recommend he still use the cream clotrimazole and take the pills cetirizine. No answer. Lvm